# Patient Record
Sex: FEMALE | Race: WHITE | NOT HISPANIC OR LATINO | Employment: OTHER | URBAN - METROPOLITAN AREA
[De-identification: names, ages, dates, MRNs, and addresses within clinical notes are randomized per-mention and may not be internally consistent; named-entity substitution may affect disease eponyms.]

---

## 2017-10-30 RX ORDER — INSULIN GLARGINE 100 [IU]/ML
20 INJECTION, SOLUTION SUBCUTANEOUS DAILY
Qty: 1 BOX | Refills: 1 | Status: SHIPPED | OUTPATIENT
Start: 2017-10-30 | End: 2019-04-26

## 2017-10-30 RX ORDER — INSULIN GLARGINE 100 [IU]/ML
INJECTION, SOLUTION SUBCUTANEOUS
COMMUNITY
End: 2017-10-30 | Stop reason: SDUPTHER

## 2017-12-21 ENCOUNTER — OFFICE VISIT (OUTPATIENT)
Dept: GASTROENTEROLOGY | Facility: CLINIC | Age: 62
End: 2017-12-21
Payer: MEDICAID

## 2017-12-21 VITALS
DIASTOLIC BLOOD PRESSURE: 82 MMHG | HEIGHT: 64 IN | HEART RATE: 68 BPM | TEMPERATURE: 98 F | SYSTOLIC BLOOD PRESSURE: 134 MMHG | RESPIRATION RATE: 17 BRPM | BODY MASS INDEX: 33.94 KG/M2 | WEIGHT: 198.81 LBS

## 2017-12-21 DIAGNOSIS — E11.9 TYPE 2 DIABETES MELLITUS WITHOUT COMPLICATION, WITH LONG-TERM CURRENT USE OF INSULIN: ICD-10-CM

## 2017-12-21 DIAGNOSIS — K73.2 CHRONIC ACTIVE HEPATITIS: ICD-10-CM

## 2017-12-21 DIAGNOSIS — E66.9 OBESITY, UNSPECIFIED CLASSIFICATION, UNSPECIFIED OBESITY TYPE, UNSPECIFIED WHETHER SERIOUS COMORBIDITY PRESENT: ICD-10-CM

## 2017-12-21 DIAGNOSIS — I10 ESSENTIAL HYPERTENSION: ICD-10-CM

## 2017-12-21 DIAGNOSIS — Z79.4 TYPE 2 DIABETES MELLITUS WITHOUT COMPLICATION, WITH LONG-TERM CURRENT USE OF INSULIN: ICD-10-CM

## 2017-12-21 DIAGNOSIS — B18.2 CHRONIC HEPATITIS C WITHOUT HEPATIC COMA: Primary | ICD-10-CM

## 2017-12-21 PROBLEM — B19.20 HEPATITIS C: Status: ACTIVE | Noted: 2017-12-21

## 2017-12-21 PROCEDURE — 99215 OFFICE O/P EST HI 40 MIN: CPT | Mod: ,,, | Performed by: INTERNAL MEDICINE

## 2017-12-21 RX ORDER — AMLODIPINE BESYLATE 10 MG/1
10 TABLET ORAL DAILY
COMMUNITY
End: 2019-02-17 | Stop reason: SDUPTHER

## 2017-12-21 RX ORDER — CITALOPRAM 20 MG/1
40 TABLET, FILM COATED ORAL NIGHTLY
COMMUNITY
End: 2023-02-04

## 2017-12-21 RX ORDER — METFORMIN HYDROCHLORIDE 500 MG/1
500 TABLET ORAL 2 TIMES DAILY WITH MEALS
Status: ON HOLD | COMMUNITY
End: 2021-09-21 | Stop reason: SDUPTHER

## 2017-12-21 RX ORDER — UBIQUINOL 100 MG
200 CAPSULE ORAL DAILY
Status: ON HOLD | COMMUNITY
End: 2021-10-26

## 2017-12-21 RX ORDER — ALPRAZOLAM 0.25 MG/1
0.25 TABLET ORAL 2 TIMES DAILY PRN
COMMUNITY
End: 2019-04-26

## 2017-12-21 RX ORDER — ATORVASTATIN CALCIUM 20 MG/1
20 TABLET, FILM COATED ORAL DAILY
Status: ON HOLD | COMMUNITY
End: 2021-03-30 | Stop reason: HOSPADM

## 2017-12-21 RX ORDER — LISINOPRIL AND HYDROCHLOROTHIAZIDE 10; 12.5 MG/1; MG/1
1 TABLET ORAL DAILY
COMMUNITY
End: 2018-09-18 | Stop reason: SDUPTHER

## 2018-01-19 NOTE — PROGRESS NOTES
[facility] [place] New Patient Visit    Subjective:           PCP: Martha Clayton    Referring Provider: No ref. provider found    Chief Complaint: Hepatitis C (positive labs)       HPI:  HPI  Lela Mckinney is a 62 y.o. female here for   Palliation of positive labs. Patient  Denies chest pain or any severe abdominal pain. I work already done have been reviewed and additional lab work has been ordered  ROS:   Constitutional: No fevers, chills, weight loss  ENT: No allergies, sore throat, rhinorrhea  CV: No chest pain, palpitations, edema  Pulm: No cough, shortness of breath, wheezing  Ophtho: No vision changes  GI: No blood in stools, change in bowel habits, nausea/vomiting  Denies alternating diarrhea/constipation.   Derm: No rash  Heme: No easy bruising or lymphadenopathy  MSK: No arthralgias or myalgias  : No dysuria, hematuria, frequency, polyuria, or flank tenderness  Endo: No hot or cold intolerance  Neuro: No syncope or seizure, or dizziness  Psych: No hallucination, depression or suicidal ideation      Medical History:  has a past medical history of Diabetes; Diabetes; Hepatitis; Hypertension; and Hypertension.      Surgical History:  has a past surgical history that includes Tubal ligation; shoulder repair; Thyroidectomy; Thyroidectomy, partial (1978); Tubal ligation (1981); and Shoulder surgery (1981).    Family History:   Family History   Problem Relation Age of Onset    Heart disease Mother     Diabetes Mother     Crohn's disease Father     Rectal cancer Father        Social History:   Social History   Substance Use Topics    Smoking status: Never Smoker    Smokeless tobacco: Never Used    Alcohol use No       The patient's social and family histories were reviewed by me and updated in the appropriate section of the electronic medical record.    Allergies:   Review of patient's allergies indicates:   Allergen Reactions    Indocin [indomethacin sodium] Itching       Medications:  "  Current Outpatient Prescriptions   Medication Sig Dispense Refill    ALPRAZolam (XANAX) 0.25 MG tablet Take 0.25 mg by mouth 2 (two) times daily as needed for Anxiety.      amLODIPine (NORVASC) 10 MG tablet Take 10 mg by mouth once daily.      atorvastatin (LIPITOR) 20 MG tablet Take 20 mg by mouth once daily.      citalopram (CELEXA) 20 MG tablet Take 20 mg by mouth once daily.      coQ10, ubiquinol, 100 mg Cap Take by mouth.      insulin glargine (BASAGLAR KWIKPEN) 100 unit/mL (3 mL) InPn pen Inject 20 Units into the skin once daily. 1 Box 1    linagliptin (TRADJENTA) 5 mg Tab tablet Take 5 mg by mouth once daily.      lisinopril-hydrochlorothiazide (PRINZIDE,ZESTORETIC) 10-12.5 mg per tablet Take 1 tablet by mouth once daily.      metFORMIN (GLUCOPHAGE) 500 MG tablet Take 500 mg by mouth 2 (two) times daily with meals.      glecaprevir-pibrentasvir 100-40 mg Tab Take 3 tablets by mouth once daily. 84 tablet 3     No current facility-administered medications for this visit.      All medications and past history have been reviewed.        Objective:        Vital Signs:  Blood pressure 134/82, pulse 68, temperature 97.7 °F (36.5 °C), temperature source Skin, resp. rate 17, height 5' 4" (1.626 m), weight 90.2 kg (198 lb 12.8 oz). Body mass index is 34.12 kg/m².    Physical Exam:   General Appearance: Well appearing in no acute distress, well developed, well                 nourished  Head: Normocephalic, without obvious abnormality  Eyes:  Pupils equal, round and reactive to light  Throat: Lips, mucosa, and tongue normal; teeth and gums normal  Lungs: CTA bilaterally in anterior and posterior fields, no wheezes, no crackles  Heart:  Regular rate and rhythm, no murmurs heard  Abdomen: Soft, non tender, non distended with positive bowel sounds in all four quadrants. No hepatosplenomegaly, ascites, or mass. Negative for succusion splash  : female   Extremities: No cyanosis, edema or deformity  Skin: No " rash  Neurologic: Normal exam      Labs/Imaging:  All lab results and imaging results have been reviewed and discussed with the patient.    Endoscopy:     Assessment/Plan:    Assessment:       1. Chronic hepatitis C without hepatic coma    2. Chronic active hepatitis    3. Type 2 diabetes mellitus without complication, with long-term current use of insulin    4. Essential hypertension    5. Obesity, unspecified classification, unspecified obesity type, unspecified whether serious comorbidity present        Plan:       Chronic hepatitis C without hepatic coma  -     glecaprevir-pibrentasvir 100-40 mg Tab; Take 3 tablets by mouth once daily.  Dispense: 84 tablet; Refill: 3    Chronic active hepatitis    Type 2 diabetes mellitus without complication, with long-term current use of insulin    Essential hypertension    Obesity, unspecified classification, unspecified obesity type, unspecified whether serious comorbidity present        Return when meds come in.    The plan was discussed with the patient and all questions/concerns have been answered to the patient's satisfaction.    CC: No ref. provider found    Electronically signed by Ankush Allen MD    This note was dictated using voice recognition software and may contain grammatical errors.

## 2018-03-18 PROBLEM — I10 BENIGN HYPERTENSION: Status: ACTIVE | Noted: 2018-03-18

## 2018-03-18 PROBLEM — E11.9 TYPE 2 DIABETES MELLITUS WITHOUT COMPLICATION: Status: ACTIVE | Noted: 2018-03-18

## 2018-03-18 PROBLEM — F41.8 DEPRESSION WITH ANXIETY: Status: ACTIVE | Noted: 2018-03-18

## 2018-03-19 ENCOUNTER — TELEPHONE (OUTPATIENT)
Dept: GASTROENTEROLOGY | Facility: CLINIC | Age: 63
End: 2018-03-19

## 2018-03-19 NOTE — TELEPHONE ENCOUNTER
Arina with [facility] called to see if patient Fibrosure score had increased to a F3 or F4 because she was reviewing that the patient was denied Mavyret Rx due to that score last year. I explained patient did not have recent labs done. I let her know that I will speak with Dr. Allen to see if he wants to order current labs.

## 2018-05-10 PROBLEM — Z83.719 FAMILY HISTORY OF COLONIC POLYPS: Status: ACTIVE | Noted: 2018-05-10

## 2018-05-22 DIAGNOSIS — B18.2 CHRONIC HEPATITIS C WITHOUT HEPATIC COMA: Primary | ICD-10-CM

## 2018-05-30 ENCOUNTER — PROCEDURE VISIT (OUTPATIENT)
Dept: HEPATOLOGY | Facility: CLINIC | Age: 63
End: 2018-05-30
Attending: NURSE PRACTITIONER
Payer: MEDICAID

## 2018-05-30 DIAGNOSIS — B18.2 CHRONIC HEPATITIS C WITHOUT HEPATIC COMA: ICD-10-CM

## 2018-05-30 PROCEDURE — 91200 LIVER ELASTOGRAPHY: CPT | Mod: 26,S$PBB,, | Performed by: NURSE PRACTITIONER

## 2018-05-30 PROCEDURE — 91200 LIVER ELASTOGRAPHY: CPT | Mod: PBBFAC | Performed by: NURSE PRACTITIONER

## 2018-05-30 NOTE — PROCEDURES
Fibroscan Procedure     Name: Lela Mckinney  Date of Procedure : 2018   :: Skyla Solomon NP  Diagnosis: HCV    Probe: XL    Fibroscan reading: 10.0 KPa    Fibrosis:F3     CAP readin dB/m    Steatosis: :S1 , >11% steatosis

## 2018-09-18 RX ORDER — LISINOPRIL AND HYDROCHLOROTHIAZIDE 10; 12.5 MG/1; MG/1
TABLET ORAL
Qty: 90 TABLET | Refills: 1 | Status: SHIPPED | OUTPATIENT
Start: 2018-09-18 | End: 2018-09-18 | Stop reason: SDDI

## 2018-09-18 NOTE — TELEPHONE ENCOUNTER
----- Message from Kristine Torres MD sent at 9/18/2018  1:21 PM CDT -----  Call pharmacy and disallow this rx-she is overdue for re check

## 2018-10-22 RX ORDER — INSULIN GLARGINE 100 [IU]/ML
INJECTION, SOLUTION SUBCUTANEOUS
Refills: 1 | OUTPATIENT
Start: 2018-10-22

## 2018-11-01 ENCOUNTER — PATIENT MESSAGE (OUTPATIENT)
Dept: FAMILY MEDICINE | Facility: CLINIC | Age: 63
End: 2018-11-01

## 2019-02-18 RX ORDER — AMLODIPINE BESYLATE 10 MG/1
TABLET ORAL
Qty: 30 TABLET | Refills: 2 | Status: SHIPPED | OUTPATIENT
Start: 2019-02-18 | End: 2019-05-09 | Stop reason: SDUPTHER

## 2019-03-16 RX ORDER — LISINOPRIL AND HYDROCHLOROTHIAZIDE 10; 12.5 MG/1; MG/1
TABLET ORAL
Qty: 90 TABLET | Refills: 1 | Status: SHIPPED | OUTPATIENT
Start: 2019-03-16

## 2019-05-01 PROBLEM — K42.0 INCARCERATED UMBILICAL HERNIA: Status: ACTIVE | Noted: 2019-05-01

## 2019-05-03 ENCOUNTER — HOSPITAL ENCOUNTER (EMERGENCY)
Facility: HOSPITAL | Age: 64
Discharge: HOME | End: 2019-05-03
Attending: EMERGENCY MEDICINE
Payer: MEDICAID

## 2019-05-03 VITALS
HEART RATE: 90 BPM | WEIGHT: 223 LBS | OXYGEN SATURATION: 97 % | SYSTOLIC BLOOD PRESSURE: 155 MMHG | RESPIRATION RATE: 18 BRPM | HEIGHT: 64 IN | TEMPERATURE: 99 F | BODY MASS INDEX: 38.07 KG/M2 | DIASTOLIC BLOOD PRESSURE: 71 MMHG

## 2019-05-03 DIAGNOSIS — M79.89 LEG SWELLING: ICD-10-CM

## 2019-05-03 DIAGNOSIS — K59.03 CONSTIPATION DUE TO OPIOID THERAPY: Primary | ICD-10-CM

## 2019-05-03 DIAGNOSIS — T40.2X5A CONSTIPATION DUE TO OPIOID THERAPY: Primary | ICD-10-CM

## 2019-05-03 LAB
ALBUMIN SERPL BCP-MCNC: 3.4 G/DL (ref 3.5–5.2)
ALP SERPL-CCNC: 56 U/L (ref 55–135)
ALT SERPL W/O P-5'-P-CCNC: 12 U/L (ref 10–44)
ANION GAP SERPL CALC-SCNC: 9 MMOL/L (ref 8–16)
AST SERPL-CCNC: 12 U/L (ref 10–40)
BASOPHILS # BLD AUTO: 0 K/UL (ref 0–0.2)
BASOPHILS NFR BLD: 0.4 % (ref 0–1.9)
BILIRUB SERPL-MCNC: 0.3 MG/DL (ref 0.1–1)
BNP SERPL-MCNC: 20 PG/ML (ref 0–99)
BUN SERPL-MCNC: 13 MG/DL (ref 8–23)
CALCIUM SERPL-MCNC: 9.6 MG/DL (ref 8.7–10.5)
CHLORIDE SERPL-SCNC: 101 MMOL/L (ref 95–110)
CO2 SERPL-SCNC: 28 MMOL/L (ref 23–29)
CREAT SERPL-MCNC: 0.9 MG/DL (ref 0.5–1.4)
DIFFERENTIAL METHOD: ABNORMAL
EOSINOPHIL # BLD AUTO: 0.2 K/UL (ref 0–0.5)
EOSINOPHIL NFR BLD: 2.3 % (ref 0–8)
ERYTHROCYTE [DISTWIDTH] IN BLOOD BY AUTOMATED COUNT: 12.4 % (ref 11.5–14.5)
EST. GFR  (AFRICAN AMERICAN): >60 ML/MIN/1.73 M^2
EST. GFR  (NON AFRICAN AMERICAN): >60 ML/MIN/1.73 M^2
GLUCOSE SERPL-MCNC: 132 MG/DL (ref 70–110)
HCT VFR BLD AUTO: 39.1 % (ref 37–48.5)
HGB BLD-MCNC: 12.9 G/DL (ref 12–16)
LYMPHOCYTES # BLD AUTO: 2 K/UL (ref 1–4.8)
LYMPHOCYTES NFR BLD: 22.6 % (ref 18–48)
MCH RBC QN AUTO: 28.8 PG (ref 27–31)
MCHC RBC AUTO-ENTMCNC: 33 G/DL (ref 32–36)
MCV RBC AUTO: 87 FL (ref 82–98)
MONOCYTES # BLD AUTO: 0.6 K/UL (ref 0.3–1)
MONOCYTES NFR BLD: 6.9 % (ref 4–15)
NEUTROPHILS # BLD AUTO: 6.1 K/UL (ref 1.8–7.7)
NEUTROPHILS NFR BLD: 67.8 % (ref 38–73)
PLATELET # BLD AUTO: 297 K/UL (ref 150–350)
PMV BLD AUTO: 8.3 FL (ref 9.2–12.9)
POTASSIUM SERPL-SCNC: 3.8 MMOL/L (ref 3.5–5.1)
PROT SERPL-MCNC: 7.1 G/DL (ref 6–8.4)
RBC # BLD AUTO: 4.47 M/UL (ref 4–5.4)
SODIUM SERPL-SCNC: 138 MMOL/L (ref 136–145)
WBC # BLD AUTO: 9 K/UL (ref 3.9–12.7)

## 2019-05-03 PROCEDURE — 83880 ASSAY OF NATRIURETIC PEPTIDE: CPT

## 2019-05-03 PROCEDURE — 85025 COMPLETE CBC W/AUTO DIFF WBC: CPT

## 2019-05-03 PROCEDURE — 80053 COMPREHEN METABOLIC PANEL: CPT

## 2019-05-03 PROCEDURE — 99284 EMERGENCY DEPT VISIT MOD MDM: CPT

## 2019-05-03 PROCEDURE — 36415 COLL VENOUS BLD VENIPUNCTURE: CPT

## 2019-05-03 RX ORDER — POLYETHYLENE GLYCOL 3350, SODIUM SULFATE ANHYDROUS, SODIUM BICARBONATE, SODIUM CHLORIDE, POTASSIUM CHLORIDE 236; 22.74; 6.74; 5.86; 2.97 G/4L; G/4L; G/4L; G/4L; G/4L
4 POWDER, FOR SOLUTION ORAL ONCE
Qty: 4000 ML | Refills: 0 | Status: SHIPPED | OUTPATIENT
Start: 2019-05-03 | End: 2019-05-03

## 2019-05-04 NOTE — ED PROVIDER NOTES
Encounter Date: 5/3/2019    SCRIBE #1 NOTE: I, Adriana Levi , am scribing for, and in the presence of,  Dr. Durant . I have scribed the entire note.       History     Chief Complaint   Patient presents with    Leg Swelling     right    Constipation     last BM 2019  9:11 PM     The patient is a 63 y.o. female who is presenting with the gradual onset of bilateral leg swelling that began x 1 day ago. The pt states that the swelling extends from her feet to knees, with both legs equally swollen. No exacerbating or mitigating factors. She denies recent injury to the area, but does endorses having undergone hernia repair surgery within the last 2 weeks. Unrelated, the pt complaints of constipation x 3 days despite treatment with stool softeners and Murelax. She associated abdominal pain, fevers, nausea, or vomiting. Pertinent PMhx includes DM, Hep C, HTN. No other pertinent past surgical hx.               The history is provided by the patient and medical records.     Review of patient's allergies indicates:   Allergen Reactions    Indocin [indomethacin sodium] Itching    Dextromethorphan Anxiety     Past Medical History:   Diagnosis Date    Diabetes     Diabetes     Hepatitis     Hepatitis C    Hypertension     Hypertension      Past Surgical History:   Procedure Laterality Date    COLONOSCOPY N/A 5/10/2018    Performed [facility] at [facility]    shoulder repair      SHOULDER SURGERY      THYROIDECTOMY      THYROIDECTOMY, PARTIAL      TUBAL LIGATION      TUBAL LIGATION      XI ROBOTIC REPAIR, HERNIA, UMBILICAL N/A 2019    Performed by Sp Craft MD at [facility] OR     Family History   Problem Relation Age of Onset    Heart disease Mother     Diabetes Mother     Crohn's disease Father     Rectal cancer Father      Social History     Tobacco Use    Smoking status: Former Smoker     Last attempt to quit: 2008     Years since quittin.0     Smokeless tobacco: Never Used    Tobacco comment: Quit 10 yrs ago   Substance Use Topics    Alcohol use: No    Drug use: No     Review of Systems   Constitutional: Negative for fever.   HENT: Negative for congestion.    Eyes: Negative for visual disturbance.   Respiratory: Negative for wheezing.    Cardiovascular: Positive for leg swelling. Negative for chest pain.   Gastrointestinal: Positive for constipation. Negative for abdominal pain.   Genitourinary: Negative for dysuria.   Musculoskeletal: Negative for joint swelling.   Skin: Negative for rash.   Neurological: Negative for syncope.   Hematological: Does not bruise/bleed easily.   Psychiatric/Behavioral: Negative for confusion.       Physical Exam     Initial Vitals [05/03/19 2038]   BP Pulse Resp Temp SpO2   (!) 155/71 90 18 98.9 °F (37.2 °C) 97 %      MAP       --         Physical Exam    Nursing note and vitals reviewed.  Constitutional: She appears well-developed and well-nourished.   HENT:   Head: Normocephalic and atraumatic.   Mouth/Throat: Oropharynx is clear and moist.   Eyes: Conjunctivae and EOM are normal. Pupils are equal, round, and reactive to light.   Neck: Normal range of motion. Neck supple. No thyroid mass present.   Cardiovascular: Regular rhythm, normal heart sounds and intact distal pulses. Exam reveals no gallop and no friction rub.    No murmur heard.  Negative Homans' sign. No palpable cord tenderness. 2+ DP and PT pulses bilaterally.    Pulmonary/Chest: Breath sounds normal. She has no wheezes. She has no rhonchi. She has no rales.   Abdominal: Soft. Normal appearance and bowel sounds are normal. There is no tenderness.   Musculoskeletal: She exhibits edema. She exhibits no tenderness.   1+ bilateral non pitting edema to both legs. Legs are non tender and symmetric.    Neurological: She is alert and oriented to person, place, and time. She has normal strength. No cranial nerve deficit or sensory deficit.   Skin: Skin is warm and  dry. No rash noted. No erythema.   Clean, dry, and intact trochar sties to the L abdomen without any underlying erythema, induration, or tenderness.      Psychiatric: She has a normal mood and affect. Her speech is normal. Cognition and memory are normal.         ED Course   Procedures  Labs Reviewed - No data to display       Imaging Results    None          Medical Decision Making:   History:   Old Medical Records: I decided to obtain old medical records.  Clinical Tests:   Radiological Study: Ordered and Reviewed  ED Management:  This patient was interviewed and examined emergently.  Vital signs are stable. She has a nonsurgical and nonfocal abdominal examination. Trocar sites are well appearing.  She has no previous history of small-bowel obstructions, nor is she progressing with vomiting, and I have low suspicion for small bowel obstruction as an evolution of her opioid induced constipation.  Additional labs are found to be negative for evidence of CHF exacerbation, renal failure, dehydration, DVT as contributing factors to her mild bilateral lower extremity edema.  She is educated about supportive care for her suspected dependent edema in the outpatient setting.  She will be discharged with a prescription for oral laxative.  She is asked to follow up with her primary care doctor in specialist regarding symptom resolution.  She is asked to return to the ER for any new, concerning, or worsening symptoms. Patient was agreeable with this plan for follow-up and she was discharged in stable condition.            Scribe Attestation:   Scribe #1: I performed the above scribed service and the documentation accurately describes the services I performed. I attest to the accuracy of the note.               Clinical Impression:       ICD-10-CM ICD-9-CM   1. Constipation due to opioid therapy K59.03 564.09    T40.2X5A E935.2   2. Leg swelling M79.89 729.81         Disposition:   Disposition: Discharged  Condition:  Stable       I, Dr. Christian Durant, personally performed the services described in this documentation. All medical record entries made by the scribe were at my direction and in my presence.  I have reviewed the chart and agree that the record reflects my personal performance and is accurate and complete. Christian Durant MD.  6:32 AM 05/04/2019                   Christian Durant MD  05/04/19 0632

## 2019-05-04 NOTE — ED NOTES
"Patient identifiers for Lela Mckinney checked and correct.  LOC:  Patient is awake, alert, and aware of environment with an appropriate affect. Patient is oriented x 3 and speaking appropriately.  APPEARANCE:  Patient resting comfortably and in no acute distress. Patient is clean and well groomed, patient's clothing is properly fastened.  SKIN:  The skin is warm and dry. Patient has normal skin turgor and moist mucus membranes. Skin is intact other then 3 "stab" wounds to left abdomen from umbilical hernia repair on Wednesday; wounds well approximated, clean and dry.  MUSCULOSKELETAL:  Patient is moving all extremities well, no obvious deformities noted. Pulses intact.   RESPIRATORY:  Airway is open and patent. Respirations are spontaneous and non-labored with normal effort and rate.  CARDIAC:  Patient has a normal rate and rhythm. No peripheral edema noted. Capillary refill < 3 seconds.  ABDOMEN:  No distention noted.  Soft and tender upon palpation, S/P umbilical hernia repair; abdominal binder in place..  NEUROLOGICAL:  PERRL. Facial expression is symmetrical. Hand grasps are equal bilaterally. Normal sensation in all extremities when touched with finger.  Allergies reported:    Review of patient's allergies indicates:   Allergen Reactions    Indocin [indomethacin sodium] Itching    Dextromethorphan Anxiety     OTHER NOTES:  Patient here with swelling and pain to right leg; states pain is behind right knee, no acute injury, NV+ with equal sensation,  Palpable pedal pulses equal.    "

## 2019-05-09 RX ORDER — AMLODIPINE BESYLATE 10 MG/1
TABLET ORAL
Qty: 30 TABLET | Refills: 2 | Status: ON HOLD | OUTPATIENT
Start: 2019-05-09 | End: 2021-03-30 | Stop reason: HOSPADM

## 2019-07-09 ENCOUNTER — HOSPITAL ENCOUNTER (EMERGENCY)
Facility: HOSPITAL | Age: 64
Discharge: HOME | End: 2019-07-09
Attending: EMERGENCY MEDICINE
Payer: MEDICAID

## 2019-07-09 VITALS
SYSTOLIC BLOOD PRESSURE: 140 MMHG | OXYGEN SATURATION: 96 % | HEIGHT: 64 IN | DIASTOLIC BLOOD PRESSURE: 65 MMHG | HEART RATE: 76 BPM | RESPIRATION RATE: 18 BRPM | TEMPERATURE: 98 F | BODY MASS INDEX: 37.22 KG/M2 | WEIGHT: 218 LBS

## 2019-07-09 DIAGNOSIS — S20.229A CONTUSION OF BACK, UNSPECIFIED LATERALITY, INITIAL ENCOUNTER: Primary | ICD-10-CM
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
## 2019-07-09 PROCEDURE — 25000003 PHARM REV CODE 250: Performed by: PHYSICIAN ASSISTANT

## 2019-07-09 PROCEDURE — 99283 EMERGENCY DEPT VISIT LOW MDM: CPT

## 2019-07-09 RX ORDER — HYDROCODONE BITARTRATE AND ACETAMINOPHEN 5; 325 MG/1; MG/1
1 TABLET ORAL
Status: COMPLETED | OUTPATIENT
Start: 2019-07-09 | End: 2019-07-09

## 2019-07-09 RX ADMIN — HYDROCODONE BITARTRATE AND ACETAMINOPHEN 1 TABLET: 5; 325 TABLET ORAL at 05:07

## 2019-07-09 NOTE — ED PROVIDER NOTES
Encounter Date: 7/9/2019    SCRIBE #1 NOTE: Louis STARK, am scribing for, and in the presence of, Leila Andres PA-C.       History     Chief Complaint   Patient presents with    Fall     from attic stairs     Back Pain     rt. buttocks and legs  / neck pain        Time seen by provider: 3:13 PM on 07/09/2019    Lela Mckinney is a 63 y.o. female with HTN, hepatitis, DM, who presents to the ED following a fall 30 minutes PTA to ED. . She was on the second step of the attic stairs when her foot got caught in the stairs, and she fell backwards. She feel from a height of <2 feet. She notes experiencing pain in her back, legs, and buttocks. She states she did not lose consciousness following the fall and denied hitting her head. She denied LOC, vomiting, headache, and dizziness. She describes the leg pain as sharp, and notes it's worse with movement. She denies taking blood thinners. She takes metformin, trajenta, lisinopril, and gabapentin as prescribed. The patient denies vision changes, or any other symptoms at this time. A PSHx of shoulder surgery noted. Drug allergies to indocin, and dextromethorphan noted.    The history is provided by the patient and a relative.     Review of patient's allergies indicates:   Allergen Reactions    Indocin [indomethacin sodium] Itching    Dextromethorphan Anxiety     Past Medical History:   Diagnosis Date    Diabetes     Diabetes     Hepatitis     Hepatitis C    Hypertension     Hypertension      Past Surgical History:   Procedure Laterality Date    COLONOSCOPY N/A 5/10/2018    Performed by Skinny [facility] MD at [facility] ENDO    shoulder repair      SHOULDER SURGERY  1981    THYROIDECTOMY      THYROIDECTOMY, PARTIAL  1978    TUBAL LIGATION      TUBAL LIGATION  1981    XI ROBOTIC REPAIR, HERNIA, UMBILICAL N/A 5/1/2019    Performed by Sp Craft MD at [facility] OR     Family History   Problem Relation Age of Onset    Heart disease Mother      Diabetes Mother     Crohn's disease Father     Rectal cancer Father      Social History     Tobacco Use    Smoking status: Former Smoker     Last attempt to quit: 2008     Years since quittin.2    Smokeless tobacco: Never Used    Tobacco comment: Quit 10 yrs ago   Substance Use Topics    Alcohol use: No    Drug use: No     Review of Systems   Constitutional: Negative for chills and fever.   HENT: Negative for congestion, nosebleeds, rhinorrhea and sore throat.    Eyes: Negative for redness and visual disturbance.   Respiratory: Negative for cough, chest tightness and shortness of breath.    Cardiovascular: Negative for chest pain and palpitations.   Gastrointestinal: Negative for abdominal pain, diarrhea, nausea and vomiting.   Genitourinary: Negative for dysuria, frequency and hematuria.   Musculoskeletal: Positive for arthralgias (Right knee, and left hip) and back pain. Negative for neck pain and neck stiffness.        +pain in buttocks  +pain in bilateral legs   Skin: Negative for rash.   Neurological: Negative for dizziness, seizures, syncope, numbness and headaches.       Physical Exam     Initial Vitals [19 1457]   BP Pulse Resp Temp SpO2   (!) 145/66 82 16 98.1 °F (36.7 °C) 95 %      MAP       --         Physical Exam    Nursing note and vitals reviewed.  Constitutional: She appears well-developed and well-nourished. She is cooperative.  Non-toxic appearance. She does not have a sickly appearance.   HENT:   Head: Normocephalic and atraumatic.   Right Ear: External ear normal.   Left Ear: External ear normal.   Nose: Nose normal.   Mouth/Throat: Oropharynx is clear and moist.   Eyes: Conjunctivae and lids are normal. Pupils are equal, round, and reactive to light.   Neck: Normal range of motion and full passive range of motion without pain. Neck supple.   Cardiovascular: Normal rate, regular rhythm and normal heart sounds. Exam reveals no gallop and no friction rub.    No murmur  heard.  Pulmonary/Chest: Breath sounds normal. She has no wheezes. She has no rhonchi. She has no rales.   Abdominal: Soft. Normal appearance. There is no tenderness. There is no rigidity, no rebound and no guarding.   Musculoskeletal:   Bony tenderness to left anterior hip. Pain with internal, and external rotation to left hip. No bony tenderness to right hip. No tenderness to cervical, thoracic, or lumbar spine. Equal strength, and sensation to bilateral lower extremities.   Neurological: She is alert and oriented to person, place, and time. She has normal strength. Gait normal. GCS eye subscore is 4. GCS verbal subscore is 5. GCS motor subscore is 6.   Skin: Skin is warm, dry and intact. No rash noted.         ED Course   Procedures  Labs Reviewed - No data to display       Imaging Results          X-Ray Sacrum And Coccyx (Final result)  Result time 07/09/19 17:27:25    Final result by Zeeshan Gleason MD (07/09/19 17:27:25)                 Impression:      No acute osseous abnormality.      Electronically signed by: Zeeshan Gleason MD  Date:    07/09/2019  Time:    17:27             Narrative:    EXAMINATION:  XR SACRUM AND COCCYX    CLINICAL HISTORY:  Unspecified fall, initial encounter    TECHNIQUE:  Two or three views of the sacrum and coccyx were performed.    COMPARISON:  None    FINDINGS:  No fracture or dislocation.  The sacroiliac joints are intact.                               X-Ray Hip 2 View Left (Final result)  Result time 07/09/19 17:27:05    Final result by Zeeshan Gleason MD (07/09/19 17:27:05)                 Impression:      No acute osseous abnormality.      Electronically signed by: Zeeshan Gleason MD  Date:    07/09/2019  Time:    17:27             Narrative:    EXAMINATION:  XR HIP 2 VIEW LEFT    CLINICAL HISTORY:  Unspecified fall, initial encounter    TECHNIQUE:  AP view of the pelvis and frog leg lateral view of the left hip were  performed.    COMPARISON:  None    FINDINGS:  No fracture or dislocation.  No significant degenerative change.                                 Medical Decision Making:   History:   Old Medical Records: I decided to obtain old medical records.  Clinical Tests:   Radiological Study: Ordered and Reviewed       APC / Resident Notes:   Urgent evaluation of a 63-year-old female who fell approximately 30 min prior to arrival she states that she was on the 2nd step going to her attic when her foot got caught and she fell backwards landing onto her butt.  She denies hitting her head, loss of conscious, nausea, vomiting or visual changes.  She is alert and oriented with a normal neurological exam. She does not take blood thinners. No indication for imaging. She has equal strength and sensation to bilateral lower extremities.  No cervical, thoracic or lumbar spine to tenderness. X-ray showed no acute fractures.  Symptomatic treatment discussed with patient. Discussed results with patient. Return precautions given. Based on my clinical evaluation, I do not appreciate any immediate, emergent, or life threatening condition or etiology that warrants additional workup today and feel that the patient can be discharged with close follow up care.  Patient is to follow up with their primary care provider. Case was discussed with Dr. Garcia who is in agreement with the plan of care. All questions answered.          Scribe Attestation:   Scribe #1: I performed the above scribed service and the documentation accurately describes the services I performed. I attest to the accuracy of the note.    I, Leila Andres PA-C, personally performed the services described in this documentation. All medical record entries made by the scribe were at my direction and in my presence.  I have reviewed the chart and agree that the record reflects my personal performance and is accurate and complete. Leila Andres PA-C.  6:06 PM 07/09/2019              Clinical Impression:       ICD-10-CM ICD-9-CM   1. Contusion of back, unspecified laterality, initial encounter S20.229A 922.31   2. Fall W19.XXXA E888.9         Disposition:   Disposition: Discharged  Condition: Stable                        Leila Andres PA-C  07/09/19 1598

## 2019-07-09 NOTE — DISCHARGE INSTRUCTIONS
Take tylenol or motrin as needed for pain.  Follow up with your primary care provider.  For worsening symptoms, chest pain, shortness of breath, increased abdominal pain, high grade fever, stroke or stroke like symptoms, immediately go to the nearest Emergency Room or call 911 as soon as possible.

## 2019-07-17 ENCOUNTER — HOSPITAL ENCOUNTER (EMERGENCY)
Facility: HOSPITAL | Age: 64
Discharge: HOME | End: 2019-07-17
Attending: EMERGENCY MEDICINE
Payer: MEDICAID

## 2019-07-17 VITALS
HEIGHT: 64 IN | WEIGHT: 217 LBS | RESPIRATION RATE: 20 BRPM | TEMPERATURE: 98 F | OXYGEN SATURATION: 98 % | SYSTOLIC BLOOD PRESSURE: 139 MMHG | HEART RATE: 98 BPM | DIASTOLIC BLOOD PRESSURE: 66 MMHG | BODY MASS INDEX: 37.05 KG/M2

## 2019-07-17 DIAGNOSIS — S39.012A STRAIN OF LUMBAR REGION, INITIAL ENCOUNTER: Primary | ICD-10-CM

## 2019-07-17 DIAGNOSIS — M62.830 LUMBAR PARASPINAL MUSCLE SPASM: ICD-10-CM

## 2019-07-17 PROCEDURE — 96372 THER/PROPH/DIAG INJ SC/IM: CPT

## 2019-07-17 PROCEDURE — 25000003 PHARM REV CODE 250: Performed by: PHYSICIAN ASSISTANT

## 2019-07-17 PROCEDURE — 63600175 PHARM REV CODE 636 W HCPCS: Performed by: PHYSICIAN ASSISTANT

## 2019-07-17 PROCEDURE — 99284 EMERGENCY DEPT VISIT MOD MDM: CPT | Mod: 25

## 2019-07-17 RX ORDER — ORPHENADRINE CITRATE 100 MG/1
100 TABLET, EXTENDED RELEASE ORAL ONCE
Status: COMPLETED | OUTPATIENT
Start: 2019-07-17 | End: 2019-07-17

## 2019-07-17 RX ORDER — CYCLOBENZAPRINE HCL 10 MG
10 TABLET ORAL 3 TIMES DAILY PRN
Qty: 12 TABLET | Refills: 0 | Status: SHIPPED | OUTPATIENT
Start: 2019-07-17 | End: 2019-07-21

## 2019-07-17 RX ORDER — DICLOFENAC SODIUM 75 MG/1
75 TABLET, DELAYED RELEASE ORAL 2 TIMES DAILY PRN
Qty: 30 TABLET | Refills: 0 | Status: ON HOLD | OUTPATIENT
Start: 2019-07-17 | End: 2021-10-26

## 2019-07-17 RX ORDER — KETOROLAC TROMETHAMINE 30 MG/ML
30 INJECTION, SOLUTION INTRAMUSCULAR; INTRAVENOUS
Status: COMPLETED | OUTPATIENT
Start: 2019-07-17 | End: 2019-07-17

## 2019-07-17 RX ADMIN — KETOROLAC TROMETHAMINE 30 MG: 30 INJECTION, SOLUTION INTRAMUSCULAR at 04:07

## 2019-07-17 RX ADMIN — ORPHENADRINE CITRATE 100 MG: 100 TABLET, EXTENDED RELEASE ORAL at 04:07

## 2019-07-17 NOTE — ED PROVIDER NOTES
"Encounter Date: 7/17/2019    SCRIBE #1 NOTE: I, Julian Locke, am scribing for, and in the presence of, Ana Maria Gold PA-C.       History     Chief Complaint   Patient presents with    Back Pain     lower back pain denies injury or trauma     Time seen by provider: 4:06 PM on 07/17/2019      Lela Mckinney is a 63 y.o. female with a PMHx of HTN, hepatitis C, diabetes, and HTN who presents to the ED for back pain that worsened this am. She states that she previously was seen in the ED 1 week ago for a "bruised bottom" status post fall that she says was improving. Today, she states that she was sitting in a desk and "witting papers," when she "felt my back give out." She states that the pain is in the lower back and and is worst on the left side. She describes a tightness like pain that stretches from her back to her left knee. She states that "When I walk I feel like I have a tightness that I haven't had before." She denies fever, SOB, chest pain, abdominal pain, nausea, vomiting, incontinence of urine or stool, acute numbness, weakness, trauma, or any other complaint at this time. She does admit to taking her prescribed gabapentin for the pain with little relief. She has a PSHx of shoulder surgery, tubal ligation, colonoscopy, and thyroidectomy.        The history is provided by the patient.     Review of patient's allergies indicates:   Allergen Reactions    Indocin [indomethacin sodium] Itching    Dextromethorphan Anxiety     Past Medical History:   Diagnosis Date    Diabetes     Diabetes     Hepatitis     Hepatitis C    Hypertension     Hypertension      Past Surgical History:   Procedure Laterality Date    COLONOSCOPY N/A 5/10/2018    Performed by Skinny [facility] at [facility] ENDO    shoulder repair      SHOULDER SURGERY  1981    THYROIDECTOMY      THYROIDECTOMY, PARTIAL  1978    TUBAL LIGATION      TUBAL LIGATION  1981    XI ROBOTIC REPAIR, HERNIA, UMBILICAL N/A 5/1/2019    Performed by " Sp Craft MD at [facility] OR     Family History   Problem Relation Age of Onset    Heart disease Mother     Diabetes Mother     Crohn's disease Father     Rectal cancer Father      Social History     Tobacco Use    Smoking status: Former Smoker     Last attempt to quit: 2008     Years since quittin.2    Smokeless tobacco: Never Used    Tobacco comment: Quit 10 yrs ago   Substance Use Topics    Alcohol use: No    Drug use: No     Review of Systems   Constitutional: Negative for chills and fever.   HENT: Negative for facial swelling and trouble swallowing.    Eyes: Negative for discharge.   Respiratory: Negative for cough, chest tightness, shortness of breath and wheezing.    Cardiovascular: Negative for chest pain and palpitations.   Gastrointestinal: Negative for abdominal pain, diarrhea, nausea and vomiting.   Genitourinary: Negative for dysuria and hematuria.   Musculoskeletal: Positive for back pain. Negative for arthralgias, joint swelling, myalgias, neck pain and neck stiffness.   Skin: Negative for color change, pallor, rash and wound.   Neurological: Negative for dizziness, syncope, weakness, light-headedness, numbness and headaches.   Hematological: Does not bruise/bleed easily.   Psychiatric/Behavioral: The patient is not nervous/anxious.    All other systems reviewed and are negative.      Physical Exam     Initial Vitals [19 1549]   BP Pulse Resp Temp SpO2   139/66 98 20 98.2 °F (36.8 °C) 98 %      MAP       --         Physical Exam    Nursing note and vitals reviewed.  Constitutional: She appears well-developed and well-nourished. She is not diaphoretic. No distress.   HENT:   Head: Normocephalic and atraumatic.   Neck: Normal range of motion. Neck supple.   Cardiovascular: Normal rate, regular rhythm, normal heart sounds and intact distal pulses. Exam reveals no gallop and no friction rub.    No murmur heard.  Pulmonary/Chest: Breath sounds normal. No respiratory  distress. She has no wheezes. She has no rhonchi. She has no rales.   Abdominal: Soft. She exhibits no distension and no mass. There is no tenderness.   Musculoskeletal: Normal range of motion. She exhibits tenderness. She exhibits no edema.   TTP noted to bilateral lumbar paraspinal muscles extending into bilateral buttocks with spasm noted.  No midline spinous process tenderness noted.  No decreased range of motion, decreased strength or loss of sensation to bilateral lower extremities.  Palpable 2+ pedal pulses.   Neurological: She is alert and oriented to person, place, and time. She has normal strength. No sensory deficit.   Skin: Skin is warm and dry. No rash and no abscess noted. No erythema.   Psychiatric: She has a normal mood and affect.         ED Course   Procedures  Labs Reviewed - No data to display       Imaging Results    None          Medical Decision Making:   History:   Old Medical Records: I decided to obtain old medical records.  Old Records Summarized: records from clinic visits and records from previous admission(s).  Differential Diagnosis:   Fracture  Dislocation  Sprain  Contusion  Strain  Spasm  Cauda equina  Epidural abscess       APC / Resident Notes:   Concern symptoms consistent with lumbar strain and associated spasm.  She is given a dose of IM Toradol and Norflex here in the emergency department.  She is beginning to have some relief.  No midline spinous process tenderness noted and no need for imaging or further testing at this time.  Low suspicion for cauda equina or epidural abscess.  She does have a history of a recent fall, in which she bruised her tailbone.  Today the symptoms began after sitting up straight, which likely caused the muscular strain and associated spasm.  She is discharged home to follow up with primary care provider and/or pain management as needed for re-evaluation and further treatment options.  She voices understanding is agreeable to the plan.  She is given  1st prescription for Voltaren and Flexeril.   I, Ana Maria Gold PA-C, personally performed the services described in this documentation. All medical record entries made by the scribe were at my direction and in my presence.  I have reviewed the chart and agree that the record reflects my personal performance and is accurate and complete. Ana Maria Gold PA-C.  6:45 PM 07/17/2019                Clinical Impression:       ICD-10-CM ICD-9-CM   1. Strain of lumbar region, initial encounter S39.012A 847.2   2. Lumbar paraspinal muscle spasm M62.830 724.8                                Ana Maria Gold PA-C  07/17/19 1846

## 2019-07-17 NOTE — ED NOTES
Given written and verbal DC instructions questions answered per MD aware to follow up with PCP encouraged to return if needed. Given RX with teaching.   Aware the RX was sent electronically to pharmacy

## 2019-07-20 DIAGNOSIS — S93.402D SPRAIN OF UNSPECIFIED LIGAMENT OF LEFT ANKLE, SUBSEQUENT ENCOUNTER: Primary | ICD-10-CM

## 2019-07-20 DIAGNOSIS — M25.572 PAIN IN LEFT ANKLE: ICD-10-CM

## 2019-08-02 ENCOUNTER — CLINICAL SUPPORT (OUTPATIENT)
Dept: REHABILITATION | Facility: HOSPITAL | Age: 64
End: 2019-08-02
Payer: MEDICAID

## 2019-08-02 DIAGNOSIS — S93.402D SPRAIN OF UNSPECIFIED LIGAMENT OF LEFT ANKLE, SUBSEQUENT ENCOUNTER: ICD-10-CM

## 2019-08-02 DIAGNOSIS — M25.572 LEFT ANKLE PAIN, UNSPECIFIED CHRONICITY: Primary | ICD-10-CM

## 2019-08-02 DIAGNOSIS — M20.40 ACQUIRED HAMMER TOE: ICD-10-CM

## 2019-08-02 DIAGNOSIS — S93.402D SPRAIN OF LIGAMENT OF LEFT ANKLE, SUBSEQUENT ENCOUNTER: ICD-10-CM

## 2019-08-02 PROCEDURE — 97140 MANUAL THERAPY 1/> REGIONS: CPT

## 2019-08-02 PROCEDURE — 97110 THERAPEUTIC EXERCISES: CPT

## 2019-08-02 PROCEDURE — 97035 APP MDLTY 1+ULTRASOUND EA 15: CPT

## 2019-08-02 NOTE — PROGRESS NOTES
Physical Therapy Daily Treatment Note     Name: Lela COLEMAN [facility] Number: [id number]    Therapy Diagnosis:   Encounter Diagnoses   Name Primary?    Sprain of unspecified ligament of left ankle, subsequent encounter     Left ankle pain, unspecified chronicity Yes    Acquired hammer toe     Sprain of ligament of left ankle, subsequent encounter      Physician: Atiya Rosas DPM    Visit Date: 8/2/2019    Physician Orders: PT eval and treat   Medical Diagnosis: pain in L ankle, sprain  Evaluation Date: 07/1619  Authorization Period Expiration: 09/13/19  Plan of Care Certification Period: 10/16/19  Visit #/Visits authorized: 4/ 12     Time In: 0905  Time Out: 1000  Total Billable Time: 55 minutes    Precautions: Standard and Fall    Subjective     Pt reports: that she feels like she is getting better since starting therapy.  Patient reports that she is having some pain but not as much as before she started.  She was compliant with home exercise program.  Response to previous treatment: good  Functional change: decreased pain, slightly easier to get around    Pain: 2/10  Location: left ankle    Objective     Lela received therapeutic exercises to develop strength, endurance, flexibility and ROM for 40 minutes including:    ·   Cardio -  Nu Step: 10 minutes  ·   Ankle - Towel Crunches: 3 x 10 reps  ·   Ankle - Marbles: 3 reps (20 marbles per rep)  ·   Ankle - ABC's ROM: 2 reps  ·   Ankle - Achilles Stretch: 3 x 30 sec  ·   Ankle - Circles (ROM): 30 reps x 2 each direction  ·   Ankle - AROM - All Directions: 2 sets of 10 w    Lela received the following manual therapy techniques: Soft tissue Mobilization were applied to the: lateral ankle musculature for 10 minutes, including:      Modality - Ultrasound: (intermittent) 1.5 w/cm2 x 6 min to L lateral ankle         Lela participated in neuromuscular re-education activities to improve: Balance and Proprioception for 0 minutes. The following  activities were included:  Lela participated in dynamic functional therapeutic activities to improve functional performance for 0  minutes, including:  Lela participated in gait training to improve functional mobility and safety for 0  minutes, including:      Lela received cold pack for 0 minutes to left ankle -- pt declined.      Home Exercises Provided and Patient Education Provided     Education provided:   - compliance with HEP    Written Home Exercises Provided: Patient instructed to cont prior HEP.  Exercises were reviewed and Lela was able to demonstrate them prior to the end of the session.  Lela demonstrated good  understanding of the education provided.     See EMR under Patient Instructions for exercises provided prior visit.    Assessment     Lela tolerated treatment session well today.  Patient with good response to progression of ankle strengthening without provocation of pain.  Patient with increased tissue restrictions palpated along the lateral aspect of the ankle.  Patient with decreased pain and improved gait pattern post care.     Lela is progressing well towards her goals.   Pt prognosis is Good.     Pt will continue to benefit from skilled outpatient physical therapy to address the deficits listed in the problem list box on initial evaluation, provide pt/family education and to maximize pt's level of independence in the home and community environment.     Pt's spiritual, cultural and educational needs considered and pt agreeable to plan of care and goals.     Anticipated barriers to physical therapy: none anticipated     Short Term Goals   ·   1a. Patient will demonstrate initial HEPwith use of handouts prn in order to optimize Rx outcomes in order to maximize functional mob. in 5 visits.  -In progress   ·   2a. Patient will relate pain </= 6/10 over the previous 7 days in order to tolerate/improve WB and improve gt quality, day to day tasks, home and community  mobility in 8 visits.-In progress   ·   ·   3a. Patient will improve L ankle ROM for DF to 10 deg (initial 5 deg), for PF to 35 deg. (initial 25 deg), inversion to 30 deg (initial 25 deg), and Eversion to neutral/0 deg (initial -5 deg) in order to improve gt quality and promote a more normal gt pattern in 8 visits.  -In progress   ·   ·   4. Patient will have decreased L ankle edema to 57.5 cm fig-8 measure (initial 58.0cm) in order to improve ROM and promote a normal gt pattern in 8 visits.  -In progress   ·   ·   5a. Patient will amb x 7 minutes with an RPE </= 6 on the modified FELIX scale in order to improve household and community amb in 8 visits.   -In progress   ·   ·   6a. Patient will improve functional score to 30% (initial 23.75%) as evidenced by the LEFS in order to improve his house hold/community mobility, ADLS and amb in 8 visits.  -In progress   ·   ·   7a. Patient will improve Tinetti gt and bal score to 23/28 in order to improve gt quality and decrease fall risk in 8 visits.  -In progress   ·     Long Term Goals   ·   1b. Patient will demonstrate final HEPwith use of handouts prn in order to cont to maximize functional mob. by discharge.  -In progress   ·   ·   2b. Patient will relate pain </= 3/10 over the previous 7 days in order to tolerate/improve WB and improve gt quality, day to day tasks, home and community mobility by discharge. -In progress   ·   ·   3b. Patient will improve L ankle ROM for PF to 45 deg. (initial 25 deg), inversion to 35 deg (initial 25 deg), and Eversion to 15 deg (initial -5 deg) in order to improve gt quality and promote a more normal gt pattern by discharge. -In progress   ·   ·   5b. Patient will amb x 10 minutes with an RPE </= 4 on the modified FELIX scale in order to improve household and community amb by discharge.  -In progress   ·    ·   6b. Patient will improve functional score to 40% (initial 23.75%) as evidenced by the LEFS in order to improve his house  hold/community mobility, ADLS and amb by discharge.-In progress   ·   ·   7b. Patient will improve Tinetti gt and bal score to >/= 24/28 in order to improve gt quality and decrease fall risk by discharge.  -In progress   ·   ·   8.  Patient will improve MMT grades for L ankle/foot DF to 5/5 (initial 4+/5), In to 5/5 (initial 4+/5) and Ev to 5/5 (initial 4+/5) in order to improve activity carmelina and promote a more mormal gt pattern by discharge.  -In progress   ·   ·   9.  Patient will decrease TUG score </= 13 seconds in orderr to decrease fall risk by discharge.  -In progress   ·     Plan     Continue with current POC and progress as tolerated by the patient.     Angela Frederick, PTA

## 2019-08-05 ENCOUNTER — CLINICAL SUPPORT (OUTPATIENT)
Dept: REHABILITATION | Facility: HOSPITAL | Age: 64
End: 2019-08-05
Payer: MEDICAID

## 2019-08-05 ENCOUNTER — DOCUMENTATION ONLY (OUTPATIENT)
Dept: REHABILITATION | Facility: HOSPITAL | Age: 64
End: 2019-08-05

## 2019-08-05 DIAGNOSIS — M25.572 ACUTE LEFT ANKLE PAIN: ICD-10-CM

## 2019-08-05 PROCEDURE — 97035 APP MDLTY 1+ULTRASOUND EA 15: CPT

## 2019-08-05 PROCEDURE — 97110 THERAPEUTIC EXERCISES: CPT

## 2019-08-05 PROCEDURE — 97140 MANUAL THERAPY 1/> REGIONS: CPT

## 2019-08-05 NOTE — PROGRESS NOTES
PT/PTA face to face conference completed regarding patient treatment plan and progress towards established goals. Treatment will be continued as described in initial report/ evaluation and treatment/progress notes. Patient will be seen by physical therapist every sixth visit or minimally once per month.       Faith Jones, PTA

## 2019-08-05 NOTE — PROGRESS NOTES
"  Physical Therapy Daily Treatment Note     Name: Lela COLEMAN [facility] Number: [id number]    Therapy Diagnosis:   Encounter Diagnosis   Name Primary?    Acute left ankle pain      Physician: Atiya Rosas DPM    Visit Date: 8/5/2019    Physician Orders: PT eval and treat   Medical Diagnosis: pain in L ankle, sprain  Evaluation Date: 07/1619  Authorization Period Expiration: 09/13/19  Plan of Care Certification Period: 10/16/19  Visit #/Visits authorized: 4/ 12     Time In: 0955  Time Out: 1000  Total Billable Time: 65 minutes    Precautions: Standard and Fall    Subjective     Pt reports: "I don't know if its the weather today or not but its bothering me this morning."   She was compliant with home exercise program.  Response to previous treatment: good  Functional change: decreased pain, slightly easier to get around    Pain: 4/10  Location: left ankle    Objective     Lela received therapeutic exercises to develop strength, endurance, flexibility and ROM for 45 minutes including:    ·   Cardio -  Nu Step: 10 minutes  ·   Ankle - Towel Crunches: 3 x 10 reps  ·   Ankle - Marbles: 3 reps (20 marbles per rep)  ·   Ankle - ABC's ROM: 2 reps  ·   Ankle - Achilles Stretch: 3 x 30 sec  ·   Ankle - Circles (ROM): 30 reps x 2 each direction  ·   Ankle - AROM - All Directions: 3 sets of 10 /c OTB    Lela received the following manual therapy techniques: Soft tissue Mobilization were applied to the: lateral ankle musculature for 10 minutes, including:      Modality - Ultrasound: (intermittent) 1.5 w/cm2 x 6 min to L lateral ankle         Lela participated in neuromuscular re-education activities to improve: Balance and Proprioception for 0 minutes. The following activities were included:  Lela participated in dynamic functional therapeutic activities to improve functional performance for 0  minutes, including:  Lela participated in gait training to improve functional mobility and safety for 0  " minutes, including:      Lela received cold pack for 0 minutes to left ankle -- pt declined.      Home Exercises Provided and Patient Education Provided     Education provided:   - compliance with HEP    Written Home Exercises Provided: Patient instructed to cont prior HEP.  Exercises were reviewed and Lela was able to demonstrate them prior to the end of the session.  Lela demonstrated good  understanding of the education provided.     See EMR under Patient Instructions for exercises provided prior visit.    Assessment     Pt tolerated the addition of increased reps /c ankle TB exercises well. She was /c reports of increase in symptoms during eversion which resolved /c rest. Pt noted /c moderate tissue dysfunction along the lateral aspect of her L calf /c mild improvement noted post STM. Pt will continue to benefit from skilled PT to improve ankle strength to allow pt to return to PLOF without pain and limitations.     Lela is progressing well towards her goals.   Pt prognosis is Good.     Pt will continue to benefit from skilled outpatient physical therapy to address the deficits listed in the problem list box on initial evaluation, provide pt/family education and to maximize pt's level of independence in the home and community environment.     Pt's spiritual, cultural and educational needs considered and pt agreeable to plan of care and goals.     Anticipated barriers to physical therapy: none anticipated     Short Term Goals   ·   1a. Patient will demonstrate initial HEPwith use of handouts prn in order to optimize Rx outcomes in order to maximize functional mob. in 5 visits.  -In progress 8/5/2019    ·   2a. Patient will relate pain </= 6/10 over the previous 7 days in order to tolerate/improve WB and improve gt quality, day to day tasks, home and community mobility in 8 visits.-In progress 8/5/2019   ·   ·   3a. Patient will improve L ankle ROM for DF to 10 deg (initial 5 deg), for PF to 35  deg. (initial 25 deg), inversion to 30 deg (initial 25 deg), and Eversion to neutral/0 deg (initial -5 deg) in order to improve gt quality and promote a more normal gt pattern in 8 visits.  -In progress 8/5/2019  ·   ·   4. Patient will have decreased L ankle edema to 57.5 cm fig-8 measure (initial 58.0cm) in order to improve ROM and promote a normal gt pattern in 8 visits.  -In progress 8/5/2019  ·  5a. Patient will amb x 7 minutes with an RPE </= 6 on the modified FELIX scale in order to improve household and community amb in 8 visits.   -In progress 8/5/2019    ·   ·   6a. Patient will improve functional score to 30% (initial 23.75%) as evidenced by the LEFS in order to improve his house hold/community mobility, ADLS and amb in 8 visits.  -In progress   ·   ·   7a. Patient will improve Tinetti gt and bal score to 23/28 in order to improve gt quality and decrease fall risk in 8 visits.  -In progress 8/5/2019    Long Term Goals   ·   1b. Patient will demonstrate final HEPwith use of handouts prn in order to cont to maximize functional mob. by discharge.  -In progress   ·   ·   2b. Patient will relate pain </= 3/10 over the previous 7 days in order to tolerate/improve WB and improve gt quality, day to day tasks, home and community mobility by discharge. -In progress   ·   ·   3b. Patient will improve L ankle ROM for PF to 45 deg. (initial 25 deg), inversion to 35 deg (initial 25 deg), and Eversion to 15 deg (initial -5 deg) in order to improve gt quality and promote a more normal gt pattern by discharge. -In progress   ·   ·   5b. Patient will amb x 10 minutes with an RPE </= 4 on the modified FELIX scale in order to improve household and community amb by discharge.  -In progress   ·    ·   6b. Patient will improve functional score to 40% (initial 23.75%) as evidenced by the LEFS in order to improve his house hold/community mobility, ADLS and amb by discharge.-In progress   ·   ·   7b. Patient will improve Tinetti gt  and bal score to >/= 24/28 in order to improve gt quality and decrease fall risk by discharge.  -In progress   ·   ·   8.  Patient will improve MMT grades for L ankle/foot DF to 5/5 (initial 4+/5), In to 5/5 (initial 4+/5) and Ev to 5/5 (initial 4+/5) in order to improve activity carmelina and promote a more mormal gt pattern by discharge.  -In progress   ·   ·   9.  Patient will decrease TUG score </= 13 seconds in orderr to decrease fall risk by discharge.  -In progress   ·     Plan     Continue with current POC and progress as tolerated by the patient. Add ankle stability exercises as per pts tolerance,    Faith Jones, PTA

## 2019-08-07 ENCOUNTER — CLINICAL SUPPORT (OUTPATIENT)
Dept: REHABILITATION | Facility: HOSPITAL | Age: 64
End: 2019-08-07
Payer: MEDICAID

## 2019-08-07 DIAGNOSIS — M25.572 ACUTE LEFT ANKLE PAIN: ICD-10-CM

## 2019-08-07 PROCEDURE — 97140 MANUAL THERAPY 1/> REGIONS: CPT

## 2019-08-07 PROCEDURE — 97110 THERAPEUTIC EXERCISES: CPT

## 2019-08-07 NOTE — PROGRESS NOTES
Physical Therapy Daily Treatment Note     Name: Lela COLEMAN [facility] Number: [id number]    Therapy Diagnosis:   Encounter Diagnosis   Name Primary?    Acute left ankle pain      Physician: Atiya Rosas DPM    Visit Date: 8/7/2019    Physician Orders: PT eval and treat   Medical Diagnosis: pain in L ankle, sprain  Evaluation Date: 07/1619  Authorization Period Expiration: 09/13/19  Plan of Care Certification Period: 10/16/19  Visit #/Visits authorized: 5/ 12     Time In: 10:00  Time Out: 11:00  Total Billable Time: 60 minutes    Precautions: Standard and Fall    Subjective     Pt reports: Feeling okay this day and with 2/10 pain to L ankle.    She was compliant with home exercise program.  Response to previous treatment: good  Functional change: decreased pain, slightly easier to get around    Pain: 2/10  Location: left ankle    Objective     Lela received therapeutic exercises to develop strength, endurance, flexibility and ROM for 45 minutes including:    ·   Cardio - Recumbent Bike: 10 minutes  ·   Ankle - Towel Crunches: 3 x 10 reps  ·   Ankle - Marbles: 3 reps (20 marbles per rep)  ·   Ankle - ABC's ROM: 2 reps  ·   Ankle - Achilles Stretch: 3 x 30 sec  ·   Ankle - Circles (ROM): 30 reps x 2 each direction  ·   Ankle - AROM - All Directions: 3 sets of 10 /c OTB    Lela received the following manual therapy techniques: Soft tissue Mobilization were applied to the: lateral ankle musculature for 10 minutes.      Modality - Ultrasound: (intermittent) 1.5 w/cm2 x 6 min to L lateral ankle (not performed)    Lela participated in neuromuscular re-education activities to improve: Balance and Proprioception for 0 minutes. The following activities were included:  Lela participated in dynamic functional therapeutic activities to improve functional performance for 0  minutes, including:  Lela participated in gait training to improve functional mobility and safety for 0  minutes,  including:      Lela received cold pack for 0 minutes to left ankle --     Home Exercises Provided and Patient Education Provided     Education provided:   - compliance with HEP    Written Home Exercises Provided: Patient instructed to cont prior HEP.    See EMR under Patient Instructions for exercises provided prior visit.    Assessment       Patient participated with Rx to address her deficits and able to perform her TE without any increases in symptoms.  She required VC/TC for improved exs quality and performance.  She cont with increased tightness to lateral ankle musculature that is responding to ST mobilizaton and she relates no pain following Rx.  She is expected to cont to prog to goals with cont Rx in order to maximize functional mobility and improve L ankle stability.        Lela is progressing well towards her goals.   Pt prognosis is Good.     Pt will continue to benefit from skilled outpatient physical therapy to address the deficits listed on initial evaluation, provide pt/family education and to maximize pt's level of independence in the home and community environment.     Pt's spiritual, cultural and educational needs considered and pt agreeable to plan of care and goals.     Anticipated barriers to physical therapy: none anticipated     Short Term Goals   ·   1a. Patient will demonstrate initial HEP with use of handouts prn in order to optimize Rx outcomes in order to maximize functional mob. in 5 visits.  -In progress 8/7/2019    ·   2a. Patient will relate pain </= 6/10 over the previous 7 days in order to tolerate/improve WB and improve gt quality, day to day tasks, home and community mobility in 8 visits.-In progress 8/7/2019   ·   ·   3a. Patient will improve L ankle ROM for DF to 10 deg (initial 5 deg), for PF to 35 deg. (initial 25 deg), inversion to 30 deg (initial 25 deg), and Eversion to neutral/0 deg (initial -5 deg) in order to improve gt quality and promote a more normal gt  pattern in 8 visits.  -In progress 8/7/2019  ·   ·   4. Patient will have decreased L ankle edema to 57.5 cm fig-8 measure (initial 58.0cm) in order to improve ROM and promote a normal gt pattern in 8 visits.  -In progress 8/7/2019  ·  5a. Patient will amb x 7 minutes with an RPE </= 6 on the modified FELIX scale in order to improve household and community amb in 8 visits.   -In progress 8/7/2019    ·   ·   6a. Patient will improve functional score to 30% (initial 23.75%) as evidenced by the LEFS in order to improve his house hold/community mobility, ADLS and amb in 8 visits.  -In progress   ·   ·   7a. Patient will improve Tinetti gt and bal score to 23/28 in order to improve gt quality and decrease fall risk in 8 visits.  -In progress 8/7/2019    Long Term Goals   ·   1b. Patient will demonstrate final HEPwith use of handouts prn in order to cont to maximize functional mob. by discharge.  -In progress   ·   ·   2b. Patient will relate pain </= 3/10 over the previous 7 days in order to tolerate/improve WB and improve gt quality, day to day tasks, home and community mobility by discharge. -In progress   ·   ·   3b. Patient will improve L ankle ROM for PF to 45 deg. (initial 25 deg), inversion to 35 deg (initial 25 deg), and Eversion to 15 deg (initial -5 deg) in order to improve gt quality and promote a more normal gt pattern by discharge. -In progress   ·   ·   5b. Patient will amb x 10 minutes with an RPE </= 4 on the modified FELIX scale in order to improve household and community amb by discharge.  -In progress   ·    ·   6b. Patient will improve functional score to 40% (initial 23.75%) as evidenced by the LEFS in order to improve his house hold/community mobility, ADLS and amb by discharge.-In progress   ·   ·   7b. Patient will improve Tinetti gt and bal score to >/= 24/28 in order to improve gt quality and decrease fall risk by discharge.  -In progress   ·   ·   8.  Patient will improve MMT grades for L  ankle/foot DF to 5/5 (initial 4+/5), In to 5/5 (initial 4+/5) and Ev to 5/5 (initial 4+/5) in order to improve activity carmelina and promote a more mormal gt pattern by discharge.  -In progress   ·   ·   9.  Patient will decrease TUG score </= 13 seconds in orderr to decrease fall risk by discharge.  -In progress   ·     Plan     Continue with current POC and progress as tolerated by the patient. Add BAPS board as per pts tolerance.    Gianni Ortiz, PT

## 2019-08-14 ENCOUNTER — CLINICAL SUPPORT (OUTPATIENT)
Dept: REHABILITATION | Facility: HOSPITAL | Age: 64
End: 2019-08-14
Payer: MEDICAID

## 2019-08-14 DIAGNOSIS — M25.572 ACUTE LEFT ANKLE PAIN: ICD-10-CM

## 2019-08-14 PROCEDURE — 97110 THERAPEUTIC EXERCISES: CPT

## 2019-08-14 NOTE — PROGRESS NOTES
Physical Therapy Daily Treatment Note     Name: Lela COLEMAN [facility] Number: [id number]    Therapy Diagnosis:   Encounter Diagnosis   Name Primary?    Acute left ankle pain      Physician: Atiya Rosas DPM    Visit Date: 8/14/2019    Physician Orders: PT eval and treat   Medical Diagnosis: pain in L ankle, sprain  Evaluation Date: 07/1619  Authorization Period Expiration: 09/13/19  Plan of Care Certification Period: 10/16/19  Visit #/Visits authorized: 6/ 12  (Progress Note due 8/16/19)  PTA visit: 1/5     Time In: 1300  Time Out: 1400  Total Billable Time: 30 minutes    Precautions: Standard and Fall    Subjective     Pt reports: Feeling okay this day and with 2/10 pain to L ankle.  Towards the end of tx pt reported just having come from her MD office where she received an injection in her L ankle. Pt reported her MD informed her it was safe to come to PT after receiving her injection.   She was compliant with home exercise program.  Response to previous treatment: good  Functional change: decreased pain, slightly easier to get around    Pain: 3/10  Location: left ankle    Objective     Lela received therapeutic exercises to develop strength, endurance, flexibility and ROM for 45 minutes including:    ·   Cardio - Recumbent Bike: 10 minutes     Gastroc Stretch 3 x 30 sec   ·   Ankle - Towel Crunches: 3 x 10 reps  ·   Ankle - Marbles: 3 reps (20 marbles per rep)  ·   Ankle - ABC's ROM: 2 reps  ·   Ankle - Circles (ROM): 30 reps x 2 each direction  ·   Ankle - AROM - All Directions: 3 sets of 10 /c [place] TB   Ankle- SLS 2 x 30 sec   Ankle- heel raises 3 x 10 reps   BAPS board x 3 minutes 2 ways (add on next visit)    Lela received the following manual therapy techniques: Soft tissue Mobilization were applied to the: lateral ankle musculature for 0 minutes.      Modality - Ultrasound: (intermittent) 1.5 w/cm2 x 6 min to L lateral ankle (not performed)    Lela participated in neuromuscular  re-education activities to improve: Balance and Proprioception for 0 minutes. The following activities were included:  Lela participated in dynamic functional therapeutic activities to improve functional performance for 0  minutes, including:  Lela participated in gait training to improve functional mobility and safety for 0  minutes, including:      Lela received cold pack for 0 minutes to left ankle --     Home Exercises Provided and Patient Education Provided     Education provided:   - compliance with HEP    Written Home Exercises Provided: Patient instructed to cont prior HEP.    See EMR under Patient Instructions for exercises provided prior visit.    Assessment     Pt tolerated progressing ankle there-ex by adding SLS and increasing resistance on TB exercises. After SLS pt verbalized increase in pain on her L ankle therefore applied CP for pain relief. Pt will continue to benefit from skilled PT to improve her tolerance to weight bearing on L ankle during mid stance phase of gait cycle.     Lela is progressing well towards her goals.   Pt prognosis is Good.     Pt will continue to benefit from skilled outpatient physical therapy to address the deficits listed on initial evaluation, provide pt/family education and to maximize pt's level of independence in the home and community environment.     Pt's spiritual, cultural and educational needs considered and pt agreeable to plan of care and goals.     Anticipated barriers to physical therapy: none anticipated     Short Term Goals   ·   1a. Patient will demonstrate initial HEP with use of handouts prn in order to optimize Rx outcomes in order to maximize functional mob. in 5 visits.  -In progress 8/14/2019    ·   2a. Patient will relate pain </= 6/10 over the previous 7 days in order to tolerate/improve WB and improve gt quality, day to day tasks, home and community mobility in 8 visits.-In progress 8/14/2019   ·   ·   3a. Patient will improve L  ankle ROM for DF to 10 deg (initial 5 deg), for PF to 35 deg. (initial 25 deg), inversion to 30 deg (initial 25 deg), and Eversion to neutral/0 deg (initial -5 deg) in order to improve gt quality and promote a more normal gt pattern in 8 visits.  -In progress 8/14/2019  ·   ·   4. Patient will have decreased L ankle edema to 57.5 cm fig-8 measure (initial 58.0cm) in order to improve ROM and promote a normal gt pattern in 8 visits.  -In progress 8/14/2019  ·  5a. Patient will amb x 7 minutes with an RPE </= 6 on the modified FELIX scale in order to improve household and community amb in 8 visits.   -In progress 8/14/2019    ·   ·   6a. Patient will improve functional score to 30% (initial 23.75%) as evidenced by the LEFS in order to improve his house hold/community mobility, ADLS and amb in 8 visits.  -In progress   ·   ·   7a. Patient will improve Tinetti gt and bal score to 23/28 in order to improve gt quality and decrease fall risk in 8 visits.  -In progress 8/14/2019    Long Term Goals   ·   1b. Patient will demonstrate final HEPwith use of handouts prn in order to cont to maximize functional mob. by discharge.  -In progress   ·   ·   2b. Patient will relate pain </= 3/10 over the previous 7 days in order to tolerate/improve WB and improve gt quality, day to day tasks, home and community mobility by discharge. -In progress   ·   ·   3b. Patient will improve L ankle ROM for PF to 45 deg. (initial 25 deg), inversion to 35 deg (initial 25 deg), and Eversion to 15 deg (initial -5 deg) in order to improve gt quality and promote a more normal gt pattern by discharge. -In progress   ·   ·   5b. Patient will amb x 10 minutes with an RPE </= 4 on the modified FELIX scale in order to improve household and community amb by discharge.  -In progress   ·    ·   6b. Patient will improve functional score to 40% (initial 23.75%) as evidenced by the LEFS in order to improve his house hold/community mobility, ADLS and amb by  discharge.-In progress   ·   ·   7b. Patient will improve Tinetti gt and bal score to >/= 24/28 in order to improve gt quality and decrease fall risk by discharge.  -In progress   ·   ·   8.  Patient will improve MMT grades for L ankle/foot DF to 5/5 (initial 4+/5), In to 5/5 (initial 4+/5) and Ev to 5/5 (initial 4+/5) in order to improve activity carmelina and promote a more mormal gt pattern by discharge.  -In progress   ·   ·   9.  Patient will decrease TUG score </= 13 seconds in orderr to decrease fall risk by discharge.  -In progress   ·     Plan     Continue with current POC and progress as tolerated by the patient. Add BAPS board as per pts tolerance and improve SLS balance.     Faith Jones, PTA

## 2019-08-19 ENCOUNTER — CLINICAL SUPPORT (OUTPATIENT)
Dept: REHABILITATION | Facility: HOSPITAL | Age: 64
End: 2019-08-19
Payer: MEDICAID

## 2019-08-19 DIAGNOSIS — M25.572 ACUTE LEFT ANKLE PAIN: Primary | ICD-10-CM

## 2019-08-19 PROCEDURE — 97530 THERAPEUTIC ACTIVITIES: CPT

## 2019-08-19 PROCEDURE — 97110 THERAPEUTIC EXERCISES: CPT

## 2019-08-19 NOTE — PROGRESS NOTES
Physical Therapy Daily Treatment Note     Name: Lela COLEMAN [facility] Number: [id number]    Therapy Diagnosis:   Encounter Diagnosis   Name Primary?    Acute left ankle pain Yes     Physician: Atiya Rosas DPM    Visit Date: 8/19/2019    Physician Orders: PT eval and treat   Medical Diagnosis: pain in L ankle, sprain  Evaluation Date: 07/1619  Authorization Period Expiration: 09/13/19  Plan of Care Certification Period: 10/16/19  Visit #/Visits authorized: 7/ 12    PTA visit: 0/5     Monthly Assessment performed on 8/19/19, next assessment due on or before 9/19/19    Time In: 09:00  Time Out: 10:10  Total Billable Time: 58 minutes    Precautions: Standard and Fall    Subjective     Pt reports: Feeling okay this day and with 2/10 pain to L ankle, she adds that the worst her pain has been over the previous week was ~5/10.    .  She was compliant with home exercise program.  Response to previous treatment: good  Functional change: decreased pain, slightly easier to get around    Pain: 2/10  Location: left ankle    Objective     Lela received therapeutic exercises to develop strength, endurance, flexibility and ROM for 45 minutes including:    ·   Cardio - Recumbent Bike: 10 minutes     Gastroc Stretch 3 x 30 sec   ·   Ankle - Towel Crunches: 5 x 10 reps  ·   Ankle - Marbles: 3 reps (20 marbles per rep)  ·   Ankle - ABC's ROM: 2 reps (DNP)  ·   Ankle - Circles (ROM): 30 reps x 2 each direction (DNP)  ·   Ankle - AROM - All Directions: 3 sets of 10 /c (OTB)   Ankle- SLS 2 x 30 sec   Ankle- heel raises 3 x 10 reps   + BAPS board x 1 minutes x 2 (DF/PF and In/Ev)   +TM x 7 min (1mph) Reports RPE of 6 on the modified Lisa scale       Lela participated in neuromuscular re-education activities to improve: Balance and Proprioception for 0 minutes. The following activities were included:     + TYRELL  (Add on next visit)        Patient received cold pack for 10 minutes to L ankle following Rx..        Lela  participated in dynamic functional therapeutic activities to improve functional performance for 13  minutes, including:    Reassessment, ROM, girth measurements, functional testing    ANKLE    Impaired Ankle: Left    ROM       Ankle AROM AROM  PROM PROM Status Comment    Left Right  Left Right               Dorsiflexion 10 -        Plantarflexion 35 -        INversion 30 -        EVersion 5 -                       GIRTH:        Ankle   Status Comment    Left Right            Figure 8 56.9 cm N/A                            FUNCTIONAL TESTS       LEFS: 36/80 = 45%     Tinetti gt and bal assessment:22/28.       TUG Score = 15 Seconds    Home Exercises Provided and Patient Education Provided     Education provided:   - compliance with HEP    Written Home Exercises Provided: Patient instructed to cont prior HEP.    See EMR under Patient Instructions for exercises provided prior visit.    Assessment     Patient cont to participate with PT to address her impairments with pain, ROM, activity carmelina and functional mob., which she has made improvements as noted and improved her functional mob to 45% as evidenced on the LEFS.  She continues to req VC/TC for improved quality and performance of TE in order to progress her to her established goals on POC.  She cont with intermittent pain which is declining along with decreased edema, ROM and progressing activity carmelina as noted with her RPE with amb.   Patient has met all of her STG and her LTG are in progress.  She is expected to cont to improve with cont Rx in order to optmize her safe functional mob/Indpendence.        Lela is progressing well towards her goals.   Pt prognosis is Good.     Pt will continue to benefit from skilled outpatient physical therapy to address the deficits listed on initial evaluation, provide pt/family education and to maximize pt's level of independence in the home and community environment.     Pt's spiritual, cultural and educational needs considered  and pt agreeable to plan of care and goals.     Anticipated barriers to physical therapy: none anticipated     Short Term Goals     1a. Patient will demonstrate initial HEP with use of handouts prn in order to optimize Rx outcomes in order to maximize functional mob. in 5 visits.  -MET 8/19/2019    ·   2a. Patient will relate pain </= 6/10 over the previous 7 days in order to tolerate/improve WB and improve gt quality, day to day tasks, home and community mobility in 8 visits. MET 8/19/2019  ·   ·   3a. Patient will improve L ankle ROM for DF to 10 deg (initial 5 deg), for PF to 35 deg. (initial 25 deg), inversion to 30 deg (initial 25 deg), and Eversion to neutral/0 deg (initial -5 deg) in order to improve gt quality and promote a more normal gt pattern in 8 visits.  -MET 8/19/2019  ·   ·   4. Patient will have decreased L ankle edema to 57.5 cm fig-8 measure (initial 58.0cm) in order to improve ROM and promote a normal gt pattern in 8 visits.  MET 8/19/2019  ·    5a. Patient will amb x 7 minutes with an RPE </= 6 on the modified FELIX scale in order to improve household and community amb in 8 visits.   MET 8/19/2019    ·   ·   6a. Patient will improve functional score to 30% (initial 23.75%) as evidenced by the LEFS in order to improve his house hold/community mobility, ADLS and amb in 8 visits.  - MET 8/19/2019  ·   ·     7a. Patient will improve Tinetti gt and bal score to 23/28 in order to improve gt quality and decrease fall risk in 8 visits.  -MET 8/19/2019    Long Term Goals   ·   1b. Patient will demonstrate final HEPwith use of handouts prn in order to cont to maximize functional mob. by discharge.  -In progress   ·   ·   2b. Patient will relate pain </= 3/10 over the previous 7 days in order to tolerate/improve WB and improve gt quality, day to day tasks, home and community mobility by discharge. -In progress   ·   ·   3b. Patient will improve L ankle ROM for PF to 45 deg. (initial 25 deg), inversion to 35  deg (initial 25 deg), and Eversion to 15 deg (initial -5 deg) in order to improve gt quality and promote a more normal gt pattern by discharge. -In progress   ·   ·   5b. Patient will amb x 10 minutes with an RPE </= 4 on the modified FELIX scale in order to improve household and community amb by discharge.  -In progress   ·    ·   6b. Patient will improve functional score to 40% (initial 23.75%) as evidenced by the LEFS in order to improve his house hold/community mobility, ADLS and amb by discharge.-In progress   ·   ·   7b. Patient will improve Tinetti gt and bal score to >/= 24/28 in order to improve gt quality and decrease fall risk by discharge.  -In progress   ·   ·   8.  Patient will improve MMT grades for L ankle/foot DF to 5/5 (initial 4+/5), In to 5/5 (initial 4+/5) and Ev to 5/5 (initial 4+/5) in order to improve activity carmelina and promote a more mormal gt pattern by discharge.  -In progress   ·   ·   9.  Patient will decrease TUG score </= 13 seconds in orderr to decrease fall risk by discharge.  -In progress   ·     Plan     Continue with current POC and progress as tolerated by the patient. Add TYRELL exs as per pts tolerance, monitor RPE with prolonged amb. and improve SLS balance.     Gianni Ortiz, PT

## 2019-08-22 ENCOUNTER — DOCUMENTATION ONLY (OUTPATIENT)
Dept: REHABILITATION | Facility: HOSPITAL | Age: 64
End: 2019-08-22

## 2019-08-26 ENCOUNTER — CLINICAL SUPPORT (OUTPATIENT)
Dept: REHABILITATION | Facility: HOSPITAL | Age: 64
End: 2019-08-26
Payer: MEDICAID

## 2019-08-26 DIAGNOSIS — M25.572 ACUTE LEFT ANKLE PAIN: Primary | ICD-10-CM

## 2019-08-26 PROCEDURE — 97110 THERAPEUTIC EXERCISES: CPT

## 2019-08-26 NOTE — PROGRESS NOTES
Physical Therapy Daily Treatment Note     Name: Lela COLEMAN [facility] Number: [id number]    Therapy Diagnosis:   Encounter Diagnosis   Name Primary?    Acute left ankle pain Yes     Physician: Atiya Rosas DPM    Visit Date: 8/26/2019    Physician Orders: PT eval and treat   Medical Diagnosis: pain in L ankle, sprain  Evaluation Date: 07/1619  Authorization Period Expiration: 09/13/19  Plan of Care Certification Period: 10/16/19  Visit #/Visits authorized: 8/ 12    PTA visit: 0/5     Monthly Assessment performed on 8/19/19, next assessment due on or before 9/19/19    Time In: 09:00  Time Out: 10:10  Total Billable Time: 59 minutes    Precautions: Standard and Fall    Subjective     Pt reports: Feeling okay this day and with 2/10 pain to L ankle.  .  She was compliant with home exercise program.  Response to previous treatment: good  Functional change: decreased pain, slightly easier to get around    Pain: 2/10  Location: left ankle    Objective     Lela received therapeutic exercises to develop strength, endurance, flexibility and ROM for 55 minutes including:    ·   Cardio - Recumbent Bike: 10 minutes     Gastroc Stretch 3 x 30 sec (with rope/ return to slant board on next visit)  ·   Ankle - Towel Crunches: 5 x 10 reps  ·   Ankle - Marbles: 3 reps (20 marbles per rep)  ·   Ankle - ABC's ROM: 2 reps (DNP)  ·   Ankle - Circles (ROM): 30 reps x 2 each direction (DNP)  ·   Ankle - AROM - All Directions: 3 sets of 10 /c (GTB)   Ankle- SLS 2 x 30 sec   Ankle- heel raises 3 x 10 reps   BAPS board x 1 minutes x 2 (DF/PF and In/Ev) Level 2   TM x 7 min (1mph) Reports RPE of 6 on the modified Lisa scale.       Lela participated in neuromuscular re-education activities to improve: Balance and Proprioception for 4 minutes. The following activities were included:     + TYRELL 30 seconds x 3 on firm surface.      Patient received cold pack for 10 minutes to L ankle following Rx..(DNP Patient  declined)        Home Exercises Provided and Patient Education Provided     Education provided:   - compliance with HEP    Written Home Exercises Provided: Patient instructed to cont prior HEP.    See EMR under Patient Instructions for exercises provided prior visit.    Assessment     Patient was able to carmelina Rx with no c/o of increased pain but did require VC/TC for setup, improved quality, and performance.  She additionaly  Was able to carmelina progress with GTB and with dynamic bal TE with TYRELL.  She is expected to progress with cont Rx in order to optimize her functiona mob and obtain her established goals set on POC.        Lela is progressing well towards her goals.   Pt prognosis is Good.     Pt will continue to benefit from skilled outpatient physical therapy to address the deficits listed on initial evaluation, provide pt/family education and to maximize pt's level of independence in the home and community environment.     Pt's spiritual, cultural and educational needs considered and pt agreeable to plan of care and goals.     Anticipated barriers to physical therapy: none anticipated     Short Term Goals     1a. Patient will demonstrate initial HEP with use of handouts prn in order to optimize Rx outcomes in order to maximize functional mob. in 5 visits.  -MET 8/26/2019    ·   2a. Patient will relate pain </= 6/10 over the previous 7 days in order to tolerate/improve WB and improve gt quality, day to day tasks, home and community mobility in 8 visits. MET 8/26/2019  ·   ·   3a. Patient will improve L ankle ROM for DF to 10 deg (initial 5 deg), for PF to 35 deg. (initial 25 deg), inversion to 30 deg (initial 25 deg), and Eversion to neutral/0 deg (initial -5 deg) in order to improve gt quality and promote a more normal gt pattern in 8 visits.  -MET 8/26/2019  ·   ·   4. Patient will have decreased L ankle edema to 57.5 cm fig-8 measure (initial 58.0cm) in order to improve ROM and promote a normal gt pattern  in 8 visits.  MET 8/26/2019  ·    5a. Patient will amb x 7 minutes with an RPE </= 6 on the modified FELIX scale in order to improve household and community amb in 8 visits.   MET 8/26/2019    ·   ·   6a. Patient will improve functional score to 30% (initial 23.75%) as evidenced by the LEFS in order to improve his house hold/community mobility, ADLS and amb in 8 visits.  - MET 8/26/2019  ·   ·     7a. Patient will improve Tinetti gt and bal score to 23/28 in order to improve gt quality and decrease fall risk in 8 visits.  -MET 8/26/2019    Long Term Goals   ·   1b. Patient will demonstrate final HEPwith use of handouts prn in order to cont to maximize functional mob. by discharge.  -In progress   ·   ·   2b. Patient will relate pain </= 3/10 over the previous 7 days in order to tolerate/improve WB and improve gt quality, day to day tasks, home and community mobility by discharge. -In progress   ·   ·   3b. Patient will improve L ankle ROM for PF to 45 deg. (initial 25 deg), inversion to 35 deg (initial 25 deg), and Eversion to 15 deg (initial -5 deg) in order to improve gt quality and promote a more normal gt pattern by discharge. -In progress   ·   ·   5b. Patient will amb x 10 minutes with an RPE </= 4 on the modified FELIX scale in order to improve household and community amb by discharge.  -In progress   ·    ·   6b. Patient will improve functional score to 40% (initial 23.75%) as evidenced by the LEFS in order to improve his house hold/community mobility, ADLS and amb by discharge.-In progress   ·   ·   7b. Patient will improve Tinetti gt and bal score to >/= 24/28 in order to improve gt quality and decrease fall risk by discharge.  -In progress   ·   ·   8.  Patient will improve MMT grades for L ankle/foot DF to 5/5 (initial 4+/5), In to 5/5 (initial 4+/5) and Ev to 5/5 (initial 4+/5) in order to improve activity carmelina and promote a more mormal gt pattern by discharge.  -In progress   ·   ·   9.  Patient will  decrease TUG score </= 13 seconds in order to decrease fall risk by discharge.  -In progress   ·     Plan     Continue with current POC and progress as tolerated, monitor RPE with prolonged amb. and improve SLS balance.     Gianni Ortiz, PT

## 2019-10-09 DIAGNOSIS — N95.9 MENOPAUSAL AND PERIMENOPAUSAL DISORDER: Primary | ICD-10-CM

## 2020-09-17 ENCOUNTER — OFFICE VISIT (OUTPATIENT)
Dept: URGENT CARE | Facility: CLINIC | Age: 65
End: 2020-09-17
Payer: MEDICAID

## 2020-09-17 VITALS
DIASTOLIC BLOOD PRESSURE: 70 MMHG | TEMPERATURE: 97 F | BODY MASS INDEX: 37.39 KG/M2 | HEIGHT: 64 IN | WEIGHT: 219 LBS | OXYGEN SATURATION: 96 % | SYSTOLIC BLOOD PRESSURE: 126 MMHG | HEART RATE: 83 BPM

## 2020-09-17 DIAGNOSIS — M25.461 KNEE EFFUSION, RIGHT: ICD-10-CM

## 2020-09-17 DIAGNOSIS — M25.561 ACUTE PAIN OF RIGHT KNEE: Primary | ICD-10-CM

## 2020-09-17 PROCEDURE — 73564 PR  X-RAY KNEE 4+ VIEW: ICD-10-PCS | Mod: RT,S$GLB,, | Performed by: NURSE PRACTITIONER

## 2020-09-17 PROCEDURE — 99204 OFFICE O/P NEW MOD 45 MIN: CPT | Mod: S$GLB,,, | Performed by: NURSE PRACTITIONER

## 2020-09-17 PROCEDURE — 73564 X-RAY EXAM KNEE 4 OR MORE: CPT | Mod: RT,S$GLB,, | Performed by: NURSE PRACTITIONER

## 2020-09-17 PROCEDURE — 99204 PR OFFICE/OUTPT VISIT, NEW, LEVL IV, 45-59 MIN: ICD-10-PCS | Mod: S$GLB,,, | Performed by: NURSE PRACTITIONER

## 2020-09-17 NOTE — PATIENT INSTRUCTIONS
Water on the Knee    Water on the knee is also known as knee effusion. The knee joint normally has less than 1 ounce of fluid. Injury or inflammation of the knee joint causes extra fluid to collect there. When this happens, the knee joint looks swollen and is usually painful. It may be hard to fully bend the knee.  The most common cause of water on the knee is osteoarthritis due to wear and tear on the joint cartilage. Other causes include injury to the cartilage, inflammatory arthritis such as gout or rheumatoid arthritis, and infection of the joint.  You may need a needle aspiration, if the cause of your water on the knee is not certain. This procedure removes a sample of joint fluid from the knee for testing. This is done with a local anesthetic. Removing excess fluid may also relieve swelling and pain.  Home care  · Limit your activities. Stay off the injured leg as much as possible until pain improves.  · Keep your leg elevated to reduce pain and swelling. When sleeping, place a pillow under the injured leg. When sitting, support the injured leg so it is level with your waist. This is very important during the first 48 hours.  · Apply an ice pack over the injured area for 15 to 20 minutes every 3 to 6 hours. You should do this for the first 24 to 48 hours. You can make an ice pack by filling a plastic bag that seals at the top with ice cubes and then wrapping it with a thin towel. Continue to use ice packs for relief of pain and swelling as needed. As the ice melts, be careful to avoid getting your wrap, splint, or cast wet. After 48 hours, apply heat(warm shower or warm bath) for 15 to 20 minutes several times a day, or alternate ice and heat. If you have to wear a hook-and-loop knee brace, you can open it to apply the ice pack, or heat, directly to the knee. Never put ice directly on the skin. Always wrap the ice in a towel or other type of cloth.  · You may use over-the-counter pain medicine to control  pain, unless another pain medicine was prescribed. If you have chronic liver or kidney disease or have ever had a stomach ulcer or GI bleeding, talk with your healthcare provider before using these medicines.  · If crutches or a walker have been recommended, do not put weight on the injured leg until you can do so without pain. Check with your healthcare provider before returning to sports or full work duties.  · If you have a hook-and-loop knee brace, you can remove it to bathe and sleep, unless told otherwise.  Follow-up care  Follow up with your healthcare provider as advised.  If you are overweight, talk to your healthcare provider about a weight loss program. The excess weight puts extra strain on your knees.  When to seek medical advice  Call your healthcare provider right away if any of these occur:  · Increasing pain, redness, or swelling of the knee  · Fever of 100.4°F (38°C) or above lasting for 24 to 48 hours  Date Last Reviewed: 11/23/2015  © 7668-0334 [employer]. [address] [place] [address]. All rights reserved. This information is not intended as a substitute for professional medical care. Always follow your healthcare professional's instructions.        Reducing Knee Pain and Swelling    Many treatments can help reduce pain and swelling in your knee. Your healthcare provider or physical therapist may suggest one or more of the following treatments:  · Icing your knee helps reduce swelling. You may be asked to ice your knee once a day or more. Apply ice for about 15 to 20 minutes at a time, with at least 40 minutes between sessions. Always keep a towel between the ice and your skin.   · Keeping your leg raised above your heart helps excess fluid flow out of your knee joint. This reduces swelling.  · Compression means wrapping an elastic bandage or neoprene sleeve snugly around your knees. This keeps fluid from collecting in your knee joint.  · Electrical stimulation, done  by a physical therapist or , can help reduce excess fluid in your knee joint.  · Anti-inflammatory medicines may be prescribed by your healthcare provider. You may take pills or receive injections in your knee.  · Isometric (margie) exercises strengthen the muscles that support your knee joint. They also help reduce excess fluid in your knee.  · Massage helps fluid drain away from your knee.  Date Last Reviewed: 10/13/2015  © 2325-2068 [employer]. [address] [place] [address], [place] [address]. All rights reserved. This information is not intended as a substitute for professional medical care. Always follow your healthcare professional's instructions.

## 2020-09-17 NOTE — PROGRESS NOTES
"Subjective:       Patient ID: Lela Mckinney is a 65 y.o. female.    Vitals:  height is 5' 4" (1.626 m) and weight is 99.3 kg (219 lb). Her oral temperature is 97.3 °F (36.3 °C). Her blood pressure is 126/70 and her pulse is 83. Her oxygen saturation is 96%.     Chief Complaint: Fall    Patient complains of right knee pain and right arm soreness s/p falling over a parking curb 4 days ago. Reports her right knee gave out on her today. Denies LOC.     Fall  The accident occurred 3 to 5 days ago. The fall occurred while walking. The pain is present in the neck, right knee, right shoulder, right foot, left foot, left hip and head. The pain is moderate. Pertinent negatives include no fever, headaches, nausea or vomiting. She has tried nothing for the symptoms.       Constitution: Negative for chills, fatigue and fever.   HENT: Negative for congestion and sore throat.    Neck: Negative for painful lymph nodes.   Cardiovascular: Negative for chest pain and leg swelling.   Eyes: Negative for double vision and blurred vision.   Respiratory: Negative for cough and shortness of breath.    Gastrointestinal: Negative for nausea, vomiting and diarrhea.   Genitourinary: Negative for dysuria, frequency, urgency and history of kidney stones.   Musculoskeletal: Negative for joint pain, joint swelling, muscle cramps and muscle ache.        Right knee pain and right arm soreness   Skin: Negative for color change, pale, rash and bruising.   Allergic/Immunologic: Negative for seasonal allergies.   Neurological: Negative for dizziness, history of vertigo, light-headedness, passing out and headaches.   Hematologic/Lymphatic: Negative for swollen lymph nodes.   Psychiatric/Behavioral: Negative for nervous/anxious, sleep disturbance and depression. The patient is not nervous/anxious.        Objective:      Physical Exam   Constitutional: She is oriented to person, place, and time. She appears well-developed. She is cooperative.  " Non-toxic appearance. She does not appear ill. No distress.       HENT:   Head: Normocephalic and atraumatic.   Ears:   Right Ear: Hearing, tympanic membrane, external ear and ear canal normal.   Left Ear: Hearing, tympanic membrane, external ear and ear canal normal.   Nose: Nose normal. No mucosal edema, rhinorrhea or nasal deformity. No epistaxis. Right sinus exhibits no maxillary sinus tenderness and no frontal sinus tenderness. Left sinus exhibits no maxillary sinus tenderness and no frontal sinus tenderness.   Mouth/Throat: Uvula is midline, oropharynx is clear and moist and mucous membranes are normal. No trismus in the jaw. Normal dentition. No uvula swelling. No posterior oropharyngeal erythema.   Eyes: Conjunctivae and lids are normal. Right eye exhibits no discharge. Left eye exhibits no discharge. No scleral icterus.   Neck: Trachea normal, normal range of motion, full passive range of motion without pain and phonation normal. Neck supple.   Cardiovascular: Normal rate, regular rhythm, normal heart sounds and normal pulses.   Pulmonary/Chest: Effort normal and breath sounds normal. No respiratory distress.   Abdominal: Soft. Normal appearance and bowel sounds are normal. She exhibits no distension, no pulsatile midline mass and no mass. There is no abdominal tenderness.   Musculoskeletal: Normal range of motion.         General: No deformity.      Right knee: She exhibits swelling and bony tenderness. She exhibits normal range of motion, no effusion, no ecchymosis, no deformity, no laceration, no erythema, normal alignment and no LCL laxity. Tenderness found. Patellar tendon tenderness noted.        Arms:    Neurological: She is alert and oriented to person, place, and time. She exhibits normal muscle tone. Coordination normal. GCS eye subscore is 4. GCS verbal subscore is 5. GCS motor subscore is 6.   Skin: Skin is warm, dry, intact, not diaphoretic and not pale. not right kneePsychiatric: Her speech is  normal and behavior is normal. Judgment and thought content normal.   Nursing note and vitals reviewed.        Assessment:       1. Acute pain of right knee    2. Knee effusion, right        Plan:       Small joint effusion noted to right knee on xray. Ace wrap applied. Advised RICE. Follow up with orthopedic. Unable to give NSAIDS due to HTN and diabetes history.     Acute pain of right knee  -     XR KNEE 3 VIEW RIGHT; Future; Expected date: 09/17/2020    Knee effusion, right

## 2020-09-21 ENCOUNTER — TELEPHONE (OUTPATIENT)
Dept: URGENT CARE | Facility: CLINIC | Age: 65
End: 2020-09-21

## 2020-10-06 ENCOUNTER — LAB VISIT (OUTPATIENT)
Dept: PRIMARY CARE CLINIC | Facility: OTHER | Age: 65
End: 2020-10-06
Attending: INTERNAL MEDICINE
Payer: MEDICAID

## 2020-10-06 DIAGNOSIS — R06.02 SHORTNESS OF BREATH: ICD-10-CM

## 2020-10-06 DIAGNOSIS — Z03.818 ENCOUNTER FOR OBSERVATION FOR SUSPECTED EXPOSURE TO OTHER BIOLOGICAL AGENTS RULED OUT: ICD-10-CM

## 2020-10-06 PROCEDURE — U0003 INFECTIOUS AGENT DETECTION BY NUCLEIC ACID (DNA OR RNA); SEVERE ACUTE RESPIRATORY SYNDROME CORONAVIRUS 2 (SARS-COV-2) (CORONAVIRUS DISEASE [COVID-19]), AMPLIFIED PROBE TECHNIQUE, MAKING USE OF HIGH THROUGHPUT TECHNOLOGIES AS DESCRIBED BY CMS-2020-01-R: HCPCS

## 2020-10-07 LAB — SARS-COV-2 RNA RESP QL NAA+PROBE: NOT DETECTED

## 2020-11-17 PROBLEM — M47.812 CERVICAL SPONDYLOSIS WITHOUT MYELOPATHY: Status: ACTIVE | Noted: 2020-11-17

## 2020-11-23 DIAGNOSIS — Z78.0 MENOPAUSE: ICD-10-CM

## 2020-11-23 DIAGNOSIS — Z12.31 ENCOUNTER FOR SCREENING MAMMOGRAM FOR MALIGNANT NEOPLASM OF BREAST: Primary | ICD-10-CM

## 2020-12-17 ENCOUNTER — HOSPITAL ENCOUNTER (OUTPATIENT)
Dept: RADIOLOGY | Facility: HOSPITAL | Age: 65
Discharge: HOME | End: 2020-12-17
Attending: NURSE PRACTITIONER
Payer: MEDICAID

## 2020-12-17 DIAGNOSIS — Z78.0 MENOPAUSE: ICD-10-CM

## 2020-12-17 DIAGNOSIS — Z12.31 ENCOUNTER FOR SCREENING MAMMOGRAM FOR MALIGNANT NEOPLASM OF BREAST: ICD-10-CM

## 2020-12-17 PROCEDURE — 77080 DXA BONE DENSITY AXIAL: CPT | Mod: TC,PO

## 2020-12-17 PROCEDURE — 77067 SCR MAMMO BI INCL CAD: CPT | Mod: TC,PO

## 2021-02-26 ENCOUNTER — OFFICE VISIT (OUTPATIENT)
Dept: URGENT CARE | Facility: CLINIC | Age: 66
End: 2021-02-26
Payer: MEDICAID

## 2021-02-26 VITALS
TEMPERATURE: 99 F | OXYGEN SATURATION: 95 % | RESPIRATION RATE: 16 BRPM | SYSTOLIC BLOOD PRESSURE: 127 MMHG | WEIGHT: 220 LBS | HEIGHT: 64 IN | DIASTOLIC BLOOD PRESSURE: 69 MMHG | HEART RATE: 79 BPM | BODY MASS INDEX: 37.56 KG/M2

## 2021-02-26 DIAGNOSIS — R42 LIGHTHEADEDNESS: Primary | ICD-10-CM

## 2021-02-26 DIAGNOSIS — J32.9 SINUSITIS, UNSPECIFIED CHRONICITY, UNSPECIFIED LOCATION: ICD-10-CM

## 2021-02-26 LAB
BILIRUB UR QL STRIP: NEGATIVE
CTP QC/QA: YES
GLUCOSE UR QL STRIP: NEGATIVE
KETONES UR QL STRIP: NEGATIVE
LEUKOCYTE ESTERASE UR QL STRIP: NEGATIVE
PH, POC UA: 6.5
POC BLOOD, URINE: NEGATIVE
POC NITRATES, URINE: NEGATIVE
PROT UR QL STRIP: NEGATIVE
SARS-COV-2 RDRP RESP QL NAA+PROBE: NEGATIVE
SP GR UR STRIP: 1.02 (ref 1–1.03)
UROBILINOGEN UR STRIP-ACNC: NORMAL (ref 0.1–1.1)

## 2021-02-26 PROCEDURE — 99214 PR OFFICE/OUTPT VISIT, EST, LEVL IV, 30-39 MIN: ICD-10-PCS | Mod: 25,S$GLB,CS, | Performed by: NURSE PRACTITIONER

## 2021-02-26 PROCEDURE — 81003 POCT URINALYSIS, DIPSTICK, AUTOMATED, W/O SCOPE: ICD-10-PCS | Mod: QW,S$GLB,, | Performed by: NURSE PRACTITIONER

## 2021-02-26 PROCEDURE — 81003 URINALYSIS AUTO W/O SCOPE: CPT | Mod: QW,S$GLB,, | Performed by: NURSE PRACTITIONER

## 2021-02-26 PROCEDURE — 87635: ICD-10-PCS | Mod: QW,S$GLB,, | Performed by: NURSE PRACTITIONER

## 2021-02-26 PROCEDURE — 99214 OFFICE O/P EST MOD 30 MIN: CPT | Mod: 25,S$GLB,CS, | Performed by: NURSE PRACTITIONER

## 2021-02-26 PROCEDURE — 87635 SARS-COV-2 COVID-19 AMP PRB: CPT | Mod: QW,S$GLB,, | Performed by: NURSE PRACTITIONER

## 2021-02-26 RX ORDER — AZELASTINE 1 MG/ML
1 SPRAY, METERED NASAL 2 TIMES DAILY
Qty: 30 ML | Refills: 0 | Status: SHIPPED | OUTPATIENT
Start: 2021-02-26 | End: 2022-02-26

## 2021-02-26 RX ORDER — CEFDINIR 300 MG/1
300 CAPSULE ORAL 2 TIMES DAILY
Qty: 20 CAPSULE | Refills: 0 | Status: SHIPPED | OUTPATIENT
Start: 2021-02-26 | End: 2021-03-08

## 2021-02-26 RX ORDER — FLUTICASONE PROPIONATE 50 MCG
1 SPRAY, SUSPENSION (ML) NASAL DAILY
Qty: 16 G | Refills: 0 | Status: SHIPPED | OUTPATIENT
Start: 2021-02-26

## 2021-03-23 ENCOUNTER — OFFICE VISIT (OUTPATIENT)
Dept: URGENT CARE | Facility: CLINIC | Age: 66
End: 2021-03-23
Payer: MEDICAID

## 2021-03-23 VITALS
OXYGEN SATURATION: 95 % | TEMPERATURE: 100 F | RESPIRATION RATE: 16 BRPM | SYSTOLIC BLOOD PRESSURE: 160 MMHG | HEART RATE: 102 BPM | DIASTOLIC BLOOD PRESSURE: 92 MMHG

## 2021-03-23 DIAGNOSIS — U07.1 COVID-19 VIRUS DETECTED: ICD-10-CM

## 2021-03-23 DIAGNOSIS — R05.9 COUGH: ICD-10-CM

## 2021-03-23 DIAGNOSIS — R50.9 FEVER, UNSPECIFIED FEVER CAUSE: Primary | ICD-10-CM

## 2021-03-23 LAB
CTP QC/QA: YES
SARS-COV-2 RDRP RESP QL NAA+PROBE: POSITIVE

## 2021-03-23 PROCEDURE — 99214 PR OFFICE/OUTPT VISIT, EST, LEVL IV, 30-39 MIN: ICD-10-PCS | Mod: S$GLB,,, | Performed by: NURSE PRACTITIONER

## 2021-03-23 PROCEDURE — 87635 PR SARS-COV-2 COVID-19 AMPLIFIED PROBE: ICD-10-PCS | Mod: QW,S$GLB,, | Performed by: NURSE PRACTITIONER

## 2021-03-23 PROCEDURE — 99214 OFFICE O/P EST MOD 30 MIN: CPT | Mod: S$GLB,,, | Performed by: NURSE PRACTITIONER

## 2021-03-23 PROCEDURE — 87635 SARS-COV-2 COVID-19 AMP PRB: CPT | Mod: QW,S$GLB,, | Performed by: NURSE PRACTITIONER

## 2021-03-23 RX ORDER — ACETAMINOPHEN 500 MG
1000 TABLET ORAL
Status: DISCONTINUED | OUTPATIENT
Start: 2021-03-23 | End: 2021-03-30 | Stop reason: HOSPADM

## 2021-03-24 ENCOUNTER — NURSE TRIAGE (OUTPATIENT)
Dept: ADMINISTRATIVE | Facility: CLINIC | Age: 66
End: 2021-03-24

## 2021-03-24 ENCOUNTER — PATIENT MESSAGE (OUTPATIENT)
Dept: ADMINISTRATIVE | Facility: OTHER | Age: 66
End: 2021-03-24

## 2021-03-24 ENCOUNTER — TELEPHONE (OUTPATIENT)
Dept: ADMINISTRATIVE | Facility: CLINIC | Age: 66
End: 2021-03-24

## 2021-03-24 ENCOUNTER — INFUSION (OUTPATIENT)
Dept: INFECTIOUS DISEASES | Facility: HOSPITAL | Age: 66
DRG: 871 | End: 2021-03-24
Attending: NURSE PRACTITIONER
Payer: MEDICAID

## 2021-03-24 VITALS
SYSTOLIC BLOOD PRESSURE: 166 MMHG | HEART RATE: 100 BPM | OXYGEN SATURATION: 98 % | DIASTOLIC BLOOD PRESSURE: 74 MMHG | HEIGHT: 64 IN | BODY MASS INDEX: 37.56 KG/M2 | WEIGHT: 220 LBS | RESPIRATION RATE: 18 BRPM | TEMPERATURE: 100 F

## 2021-03-24 DIAGNOSIS — U07.1 COVID-19: Primary | ICD-10-CM

## 2021-03-24 PROCEDURE — 63600175 PHARM REV CODE 636 W HCPCS: Performed by: NURSE PRACTITIONER

## 2021-03-24 PROCEDURE — M0243 CASIRIVI AND IMDEVI INFUSION: HCPCS | Performed by: NURSE PRACTITIONER

## 2021-03-24 PROCEDURE — 25000003 PHARM REV CODE 250: Performed by: NURSE PRACTITIONER

## 2021-03-24 RX ORDER — EPINEPHRINE 0.3 MG/.3ML
0.3 INJECTION SUBCUTANEOUS
Status: DISCONTINUED | OUTPATIENT
Start: 2021-03-24 | End: 2023-02-04

## 2021-03-24 RX ORDER — ALBUTEROL SULFATE 90 UG/1
2 AEROSOL, METERED RESPIRATORY (INHALATION)
Status: DISCONTINUED | OUTPATIENT
Start: 2021-03-24 | End: 2023-02-04

## 2021-03-24 RX ORDER — ACETAMINOPHEN 325 MG/1
650 TABLET ORAL ONCE AS NEEDED
Status: DISCONTINUED | OUTPATIENT
Start: 2021-03-24 | End: 2023-02-04

## 2021-03-24 RX ORDER — ONDANSETRON 4 MG/1
4 TABLET, ORALLY DISINTEGRATING ORAL ONCE AS NEEDED
Status: DISCONTINUED | OUTPATIENT
Start: 2021-03-24 | End: 2023-02-04

## 2021-03-24 RX ORDER — DIPHENHYDRAMINE HYDROCHLORIDE 50 MG/ML
25 INJECTION INTRAMUSCULAR; INTRAVENOUS ONCE AS NEEDED
Status: DISCONTINUED | OUTPATIENT
Start: 2021-03-24 | End: 2023-02-04

## 2021-03-24 RX ORDER — SODIUM CHLORIDE 0.9 % (FLUSH) 0.9 %
10 SYRINGE (ML) INJECTION
Status: DISCONTINUED | OUTPATIENT
Start: 2021-03-24 | End: 2023-02-04

## 2021-03-24 RX ADMIN — CASIRIVIMAB: 1332 INJECTION, SOLUTION, CONCENTRATE INTRAVENOUS at 09:03

## 2021-03-24 RX ADMIN — SODIUM CHLORIDE: 0.9 INJECTION, SOLUTION INTRAVENOUS at 09:03

## 2021-03-25 ENCOUNTER — HOSPITAL ENCOUNTER (INPATIENT)
Facility: HOSPITAL | Age: 66
LOS: 5 days | Discharge: HOME HEALTH | DRG: 871 | End: 2021-03-30
Attending: EMERGENCY MEDICINE | Admitting: INTERNAL MEDICINE
Payer: MEDICAID

## 2021-03-25 DIAGNOSIS — J12.82 PNEUMONIA DUE TO COVID-19 VIRUS: ICD-10-CM

## 2021-03-25 DIAGNOSIS — U07.1 PNEUMONIA DUE TO COVID-19 VIRUS: ICD-10-CM

## 2021-03-25 DIAGNOSIS — R79.89 ELEVATED TROPONIN: ICD-10-CM

## 2021-03-25 DIAGNOSIS — J32.9 SINUSITIS, UNSPECIFIED CHRONICITY, UNSPECIFIED LOCATION: ICD-10-CM

## 2021-03-25 DIAGNOSIS — R06.02 SOB (SHORTNESS OF BREATH): ICD-10-CM

## 2021-03-25 DIAGNOSIS — J96.01 ACUTE HYPOXEMIC RESPIRATORY FAILURE: ICD-10-CM

## 2021-03-25 DIAGNOSIS — U07.1 COVID-19 VIRUS INFECTION: ICD-10-CM

## 2021-03-25 DIAGNOSIS — R09.02 HYPOXIA: Primary | ICD-10-CM

## 2021-03-25 DIAGNOSIS — Z20.822 SUSPECTED COVID-19 VIRUS INFECTION: ICD-10-CM

## 2021-03-25 PROBLEM — Z71.89 GOALS OF CARE, COUNSELING/DISCUSSION: Status: ACTIVE | Noted: 2021-03-25

## 2021-03-25 PROBLEM — K21.9 GERD (GASTROESOPHAGEAL REFLUX DISEASE): Status: ACTIVE | Noted: 2021-03-25

## 2021-03-25 LAB
ALBUMIN SERPL BCP-MCNC: 3.1 G/DL (ref 3.5–5.2)
ALP SERPL-CCNC: 67 U/L (ref 55–135)
ALT SERPL W/O P-5'-P-CCNC: 14 U/L (ref 10–44)
ANION GAP SERPL CALC-SCNC: 12 MMOL/L (ref 8–16)
AORTIC ROOT ANNULUS: 2.98 CM
AORTIC VALVE CUSP SEPERATION: 1.69 CM
AST SERPL-CCNC: 19 U/L (ref 10–40)
AV INDEX (PROSTH): 0.86
AV MEAN GRADIENT: 3 MMHG
AV PEAK GRADIENT: 6 MMHG
AV VALVE AREA: 2.64 CM2
AV VELOCITY RATIO: 0.79
BACTERIA #/AREA URNS HPF: ABNORMAL /HPF
BASOPHILS # BLD AUTO: 0.04 K/UL (ref 0–0.2)
BASOPHILS NFR BLD: 0.2 % (ref 0–1.9)
BILIRUB SERPL-MCNC: 1.1 MG/DL (ref 0.1–1)
BILIRUB UR QL STRIP: NEGATIVE
BNP SERPL-MCNC: 272 PG/ML (ref 0–99)
BSA FOR ECHO PROCEDURE: 2.12 M2
BUN SERPL-MCNC: 14 MG/DL (ref 8–23)
CALCIUM SERPL-MCNC: 8.4 MG/DL (ref 8.7–10.5)
CHLORIDE SERPL-SCNC: 106 MMOL/L (ref 95–110)
CK SERPL-CCNC: 64 U/L (ref 20–180)
CLARITY UR: ABNORMAL
CO2 SERPL-SCNC: 19 MMOL/L (ref 23–29)
COLOR UR: YELLOW
CREAT SERPL-MCNC: 1.2 MG/DL (ref 0.5–1.4)
CRP SERPL-MCNC: 246.6 MG/L (ref 0–8.2)
CV ECHO LV RWT: 0.51 CM
D DIMER PPP IA.FEU-MCNC: 3.71 MG/L FEU
DIFFERENTIAL METHOD: ABNORMAL
DOP CALC AO PEAK VEL: 1.2 M/S
DOP CALC AO VTI: 28.12 CM
DOP CALC LVOT AREA: 3.1 CM2
DOP CALC LVOT DIAMETER: 1.98 CM
DOP CALC LVOT PEAK VEL: 0.95 M/S
DOP CALC LVOT STROKE VOLUME: 74.29 CM3
DOP CALCLVOT PEAK VEL VTI: 24.14 CM
E WAVE DECELERATION TIME: 195.9 MSEC
E/A RATIO: 1.41
E/E' RATIO: 13.38 M/S
ECHO LV POSTERIOR WALL: 1.15 CM (ref 0.6–1.1)
EOSINOPHIL # BLD AUTO: 0 K/UL (ref 0–0.5)
EOSINOPHIL NFR BLD: 0.1 % (ref 0–8)
ERYTHROCYTE [DISTWIDTH] IN BLOOD BY AUTOMATED COUNT: 12 % (ref 11.5–14.5)
EST. GFR  (AFRICAN AMERICAN): 54 ML/MIN/1.73 M^2
EST. GFR  (NON AFRICAN AMERICAN): 47 ML/MIN/1.73 M^2
FERRITIN SERPL-MCNC: 301 NG/ML (ref 20–300)
FRACTIONAL SHORTENING: 29 % (ref 28–44)
GLUCOSE SERPL-MCNC: 169 MG/DL (ref 70–110)
GLUCOSE UR QL STRIP: NEGATIVE
HCT VFR BLD AUTO: 41.1 % (ref 37–48.5)
HGB BLD-MCNC: 13.6 G/DL (ref 12–16)
HGB UR QL STRIP: ABNORMAL
HYALINE CASTS #/AREA URNS LPF: 0 /LPF
IMM GRANULOCYTES # BLD AUTO: 0.09 K/UL (ref 0–0.04)
IMM GRANULOCYTES NFR BLD AUTO: 0.5 % (ref 0–0.5)
INR PPP: 1.1 (ref 0.8–1.2)
INTERVENTRICULAR SEPTUM: 1.24 CM (ref 0.6–1.1)
IVRT: 76.12 MSEC
KETONES UR QL STRIP: NEGATIVE
LA MAJOR: 5.57 CM
LA MINOR: 4.9 CM
LA WIDTH: 3.55 CM
LACTATE SERPL-SCNC: 1.8 MMOL/L (ref 0.5–2.2)
LDH SERPL L TO P-CCNC: 196 U/L (ref 110–260)
LEFT ATRIUM SIZE: 3.57 CM
LEFT ATRIUM VOLUME INDEX: 27.5 ML/M2
LEFT ATRIUM VOLUME: 56.16 CM3
LEFT INTERNAL DIMENSION IN SYSTOLE: 3.2 CM (ref 2.1–4)
LEFT VENTRICLE DIASTOLIC VOLUME INDEX: 45.19 ML/M2
LEFT VENTRICLE DIASTOLIC VOLUME: 92.18 ML
LEFT VENTRICLE MASS INDEX: 96 G/M2
LEFT VENTRICLE SYSTOLIC VOLUME INDEX: 20.1 ML/M2
LEFT VENTRICLE SYSTOLIC VOLUME: 40.99 ML
LEFT VENTRICULAR INTERNAL DIMENSION IN DIASTOLE: 4.49 CM (ref 3.5–6)
LEFT VENTRICULAR MASS: 196.24 G
LEUKOCYTE ESTERASE UR QL STRIP: ABNORMAL
LV LATERAL E/E' RATIO: 13.38 M/S
LV SEPTAL E/E' RATIO: 13.38 M/S
LYMPHOCYTES # BLD AUTO: 1.2 K/UL (ref 1–4.8)
LYMPHOCYTES NFR BLD: 6.5 % (ref 18–48)
MCH RBC QN AUTO: 30 PG (ref 27–31)
MCHC RBC AUTO-ENTMCNC: 33.1 G/DL (ref 32–36)
MCV RBC AUTO: 91 FL (ref 82–98)
MICROSCOPIC COMMENT: ABNORMAL
MONOCYTES # BLD AUTO: 0.7 K/UL (ref 0.3–1)
MONOCYTES NFR BLD: 3.7 % (ref 4–15)
MV MEAN GRADIENT: 1 MMHG
MV PEAK A VEL: 0.76 M/S
MV PEAK E VEL: 1.07 M/S
MV PEAK GRADIENT: 4 MMHG
MV STENOSIS PRESSURE HALF TIME: 100.13 MS
MV VALVE AREA P 1/2 METHOD: 2.2 CM2
NEUTROPHILS # BLD AUTO: 15.8 K/UL (ref 1.8–7.7)
NEUTROPHILS NFR BLD: 89 % (ref 38–73)
NITRITE UR QL STRIP: NEGATIVE
NRBC BLD-RTO: 0 /100 WBC
PH UR STRIP: 6 [PH] (ref 5–8)
PISA MRMAX VEL: 0.04 M/S
PISA RADIUS: 0.27 CM
PISA TR MAX VEL: 2.46 M/S
PISA TR VN NYQUIST: 0 M/S
PLATELET # BLD AUTO: 195 K/UL (ref 150–350)
PMV BLD AUTO: 10.6 FL (ref 9.2–12.9)
POCT GLUCOSE: 196 MG/DL (ref 70–110)
POCT GLUCOSE: 251 MG/DL (ref 70–110)
POCT GLUCOSE: 272 MG/DL (ref 70–110)
POCT GLUCOSE: 302 MG/DL (ref 70–110)
POTASSIUM SERPL-SCNC: 3.7 MMOL/L (ref 3.5–5.1)
PROCALCITONIN SERPL IA-MCNC: 1.48 NG/ML
PROT SERPL-MCNC: 7.4 G/DL (ref 6–8.4)
PROT UR QL STRIP: ABNORMAL
PROTHROMBIN TIME: 11.5 SEC (ref 9–12.5)
PV PEAK VELOCITY: 0.88 CM/S
RA MAJOR: 5 CM
RA PRESSURE: 3 MMHG
RA WIDTH: 2.75 CM
RBC # BLD AUTO: 4.54 M/UL (ref 4–5.4)
RBC #/AREA URNS HPF: 1 /HPF (ref 0–4)
RIGHT VENTRICULAR END-DIASTOLIC DIMENSION: 3.04 CM
RV TISSUE DOPPLER FREE WALL SYSTOLIC VELOCITY 1 (APICAL 4 CHAMBER VIEW): 12.21 CM/S
SODIUM SERPL-SCNC: 137 MMOL/L (ref 136–145)
SP GR UR STRIP: >=1.03 (ref 1–1.03)
SQUAMOUS #/AREA URNS HPF: 1 /HPF
STJ: 2.93 CM
TDI LATERAL: 0.08 M/S
TDI SEPTAL: 0.08 M/S
TDI: 0.08 M/S
TR MAX PG: 24 MMHG
TRICUSPID ANNULAR PLANE SYSTOLIC EXCURSION: 2.38 CM
TROPONIN I SERPL DL<=0.01 NG/ML-MCNC: 0.74 NG/ML (ref 0–0.03)
TROPONIN I SERPL DL<=0.01 NG/ML-MCNC: 1.39 NG/ML (ref 0–0.03)
TROPONIN I SERPL DL<=0.01 NG/ML-MCNC: 1.65 NG/ML (ref 0–0.03)
TV REST PULMONARY ARTERY PRESSURE: 27 MMHG
URN SPEC COLLECT METH UR: ABNORMAL
UROBILINOGEN UR STRIP-ACNC: ABNORMAL EU/DL
WBC # BLD AUTO: 17.79 K/UL (ref 3.9–12.7)
WBC #/AREA URNS HPF: 80 /HPF (ref 0–5)

## 2021-03-25 PROCEDURE — 87077 CULTURE AEROBIC IDENTIFY: CPT | Mod: 59 | Performed by: EMERGENCY MEDICINE

## 2021-03-25 PROCEDURE — 96375 TX/PRO/DX INJ NEW DRUG ADDON: CPT

## 2021-03-25 PROCEDURE — 25000003 PHARM REV CODE 250: Performed by: SPECIALIST

## 2021-03-25 PROCEDURE — 85610 PROTHROMBIN TIME: CPT | Performed by: NURSE PRACTITIONER

## 2021-03-25 PROCEDURE — 93010 ELECTROCARDIOGRAM REPORT: CPT | Mod: ,,, | Performed by: SPECIALIST

## 2021-03-25 PROCEDURE — 25000003 PHARM REV CODE 250: Performed by: EMERGENCY MEDICINE

## 2021-03-25 PROCEDURE — 87077 CULTURE AEROBIC IDENTIFY: CPT | Performed by: NURSE PRACTITIONER

## 2021-03-25 PROCEDURE — 83605 ASSAY OF LACTIC ACID: CPT | Performed by: EMERGENCY MEDICINE

## 2021-03-25 PROCEDURE — 81000 URINALYSIS NONAUTO W/SCOPE: CPT | Performed by: NURSE PRACTITIONER

## 2021-03-25 PROCEDURE — 63600175 PHARM REV CODE 636 W HCPCS: Performed by: NURSE PRACTITIONER

## 2021-03-25 PROCEDURE — 27100098 HC SPACER

## 2021-03-25 PROCEDURE — 83880 ASSAY OF NATRIURETIC PEPTIDE: CPT | Performed by: NURSE PRACTITIONER

## 2021-03-25 PROCEDURE — 82728 ASSAY OF FERRITIN: CPT | Performed by: EMERGENCY MEDICINE

## 2021-03-25 PROCEDURE — 84484 ASSAY OF TROPONIN QUANT: CPT | Performed by: EMERGENCY MEDICINE

## 2021-03-25 PROCEDURE — 87186 SC STD MICRODIL/AGAR DIL: CPT | Performed by: NURSE PRACTITIONER

## 2021-03-25 PROCEDURE — 93010 EKG 12-LEAD: ICD-10-PCS | Mod: ,,, | Performed by: SPECIALIST

## 2021-03-25 PROCEDURE — 93005 ELECTROCARDIOGRAM TRACING: CPT

## 2021-03-25 PROCEDURE — 94640 AIRWAY INHALATION TREATMENT: CPT

## 2021-03-25 PROCEDURE — 85379 FIBRIN DEGRADATION QUANT: CPT | Performed by: NURSE PRACTITIONER

## 2021-03-25 PROCEDURE — 87088 URINE BACTERIA CULTURE: CPT | Performed by: NURSE PRACTITIONER

## 2021-03-25 PROCEDURE — 80053 COMPREHEN METABOLIC PANEL: CPT | Performed by: EMERGENCY MEDICINE

## 2021-03-25 PROCEDURE — 94761 N-INVAS EAR/PLS OXIMETRY MLT: CPT

## 2021-03-25 PROCEDURE — 63600175 PHARM REV CODE 636 W HCPCS: Performed by: EMERGENCY MEDICINE

## 2021-03-25 PROCEDURE — 36415 COLL VENOUS BLD VENIPUNCTURE: CPT | Performed by: EMERGENCY MEDICINE

## 2021-03-25 PROCEDURE — 82550 ASSAY OF CK (CPK): CPT | Performed by: EMERGENCY MEDICINE

## 2021-03-25 PROCEDURE — 86140 C-REACTIVE PROTEIN: CPT | Performed by: EMERGENCY MEDICINE

## 2021-03-25 PROCEDURE — 99291 CRITICAL CARE FIRST HOUR: CPT | Mod: 25

## 2021-03-25 PROCEDURE — 25000003 PHARM REV CODE 250: Performed by: NURSE PRACTITIONER

## 2021-03-25 PROCEDURE — 27000221 HC OXYGEN, UP TO 24 HOURS

## 2021-03-25 PROCEDURE — 87086 URINE CULTURE/COLONY COUNT: CPT | Performed by: NURSE PRACTITIONER

## 2021-03-25 PROCEDURE — 96374 THER/PROPH/DIAG INJ IV PUSH: CPT

## 2021-03-25 PROCEDURE — 20000000 HC ICU ROOM

## 2021-03-25 PROCEDURE — 25000242 PHARM REV CODE 250 ALT 637 W/ HCPCS: Performed by: NURSE PRACTITIONER

## 2021-03-25 PROCEDURE — 36415 COLL VENOUS BLD VENIPUNCTURE: CPT | Performed by: NURSE PRACTITIONER

## 2021-03-25 PROCEDURE — 87186 SC STD MICRODIL/AGAR DIL: CPT | Mod: 59 | Performed by: EMERGENCY MEDICINE

## 2021-03-25 PROCEDURE — 83615 LACTATE (LD) (LDH) ENZYME: CPT | Performed by: EMERGENCY MEDICINE

## 2021-03-25 PROCEDURE — 84145 PROCALCITONIN (PCT): CPT | Performed by: NURSE PRACTITIONER

## 2021-03-25 PROCEDURE — 85025 COMPLETE CBC W/AUTO DIFF WBC: CPT | Performed by: EMERGENCY MEDICINE

## 2021-03-25 PROCEDURE — 87040 BLOOD CULTURE FOR BACTERIA: CPT | Performed by: EMERGENCY MEDICINE

## 2021-03-25 PROCEDURE — 84484 ASSAY OF TROPONIN QUANT: CPT | Mod: 91 | Performed by: NURSE PRACTITIONER

## 2021-03-25 RX ORDER — TRAZODONE HYDROCHLORIDE 50 MG/1
50 TABLET ORAL NIGHTLY
Status: DISCONTINUED | OUTPATIENT
Start: 2021-03-25 | End: 2021-03-30 | Stop reason: HOSPADM

## 2021-03-25 RX ORDER — IBUPROFEN 400 MG/1
400 TABLET ORAL
Status: COMPLETED | OUTPATIENT
Start: 2021-03-25 | End: 2021-03-25

## 2021-03-25 RX ORDER — LANOLIN ALCOHOL/MO/W.PET/CERES
800 CREAM (GRAM) TOPICAL
Status: DISCONTINUED | OUTPATIENT
Start: 2021-03-25 | End: 2021-03-30 | Stop reason: HOSPADM

## 2021-03-25 RX ORDER — SODIUM CHLORIDE 0.9 % (FLUSH) 0.9 %
10 SYRINGE (ML) INJECTION
Status: DISCONTINUED | OUTPATIENT
Start: 2021-03-25 | End: 2021-03-30 | Stop reason: HOSPADM

## 2021-03-25 RX ORDER — GLUCAGON 1 MG
1 KIT INJECTION
Status: DISCONTINUED | OUTPATIENT
Start: 2021-03-25 | End: 2021-03-30 | Stop reason: HOSPADM

## 2021-03-25 RX ORDER — ACETAMINOPHEN 500 MG
5000 TABLET ORAL DAILY
Status: DISCONTINUED | OUTPATIENT
Start: 2021-03-25 | End: 2021-03-30 | Stop reason: HOSPADM

## 2021-03-25 RX ORDER — DEXAMETHASONE SODIUM PHOSPHATE 4 MG/ML
6 INJECTION, SOLUTION INTRA-ARTICULAR; INTRALESIONAL; INTRAMUSCULAR; INTRAVENOUS; SOFT TISSUE
Status: COMPLETED | OUTPATIENT
Start: 2021-03-25 | End: 2021-03-25

## 2021-03-25 RX ORDER — ATORVASTATIN CALCIUM 20 MG/1
20 TABLET, FILM COATED ORAL DAILY
Status: DISCONTINUED | OUTPATIENT
Start: 2021-03-25 | End: 2021-03-27

## 2021-03-25 RX ORDER — FAMOTIDINE 10 MG/ML
20 INJECTION INTRAVENOUS 2 TIMES DAILY
Status: DISCONTINUED | OUTPATIENT
Start: 2021-03-25 | End: 2021-03-28

## 2021-03-25 RX ORDER — IBUPROFEN 200 MG
24 TABLET ORAL
Status: DISCONTINUED | OUTPATIENT
Start: 2021-03-25 | End: 2021-03-30 | Stop reason: HOSPADM

## 2021-03-25 RX ORDER — INSULIN ASPART 100 [IU]/ML
0-5 INJECTION, SOLUTION INTRAVENOUS; SUBCUTANEOUS
Status: DISCONTINUED | OUTPATIENT
Start: 2021-03-25 | End: 2021-03-30 | Stop reason: HOSPADM

## 2021-03-25 RX ORDER — FLUTICASONE PROPIONATE 50 MCG
1 SPRAY, SUSPENSION (ML) NASAL DAILY
Status: DISCONTINUED | OUTPATIENT
Start: 2021-03-25 | End: 2021-03-30 | Stop reason: HOSPADM

## 2021-03-25 RX ORDER — ASCORBIC ACID 500 MG
500 TABLET ORAL 2 TIMES DAILY
Status: DISCONTINUED | OUTPATIENT
Start: 2021-03-25 | End: 2021-03-30 | Stop reason: HOSPADM

## 2021-03-25 RX ORDER — BENZONATATE 100 MG/1
100 CAPSULE ORAL 3 TIMES DAILY PRN
Status: DISCONTINUED | OUTPATIENT
Start: 2021-03-25 | End: 2021-03-30 | Stop reason: HOSPADM

## 2021-03-25 RX ORDER — ASPIRIN 81 MG/1
81 TABLET ORAL DAILY
Status: DISCONTINUED | OUTPATIENT
Start: 2021-03-26 | End: 2021-03-30 | Stop reason: HOSPADM

## 2021-03-25 RX ORDER — ACETAMINOPHEN 325 MG/1
650 TABLET ORAL EVERY 4 HOURS PRN
Status: DISCONTINUED | OUTPATIENT
Start: 2021-03-25 | End: 2021-03-30 | Stop reason: HOSPADM

## 2021-03-25 RX ORDER — ONDANSETRON 2 MG/ML
4 INJECTION INTRAMUSCULAR; INTRAVENOUS EVERY 8 HOURS PRN
Status: DISCONTINUED | OUTPATIENT
Start: 2021-03-25 | End: 2021-03-30 | Stop reason: HOSPADM

## 2021-03-25 RX ORDER — ONDANSETRON 2 MG/ML
4 INJECTION INTRAMUSCULAR; INTRAVENOUS
Status: COMPLETED | OUTPATIENT
Start: 2021-03-25 | End: 2021-03-25

## 2021-03-25 RX ORDER — ASPIRIN 325 MG
325 TABLET, DELAYED RELEASE (ENTERIC COATED) ORAL ONCE
Status: COMPLETED | OUTPATIENT
Start: 2021-03-25 | End: 2021-03-25

## 2021-03-25 RX ORDER — IBUPROFEN 200 MG
16 TABLET ORAL
Status: DISCONTINUED | OUTPATIENT
Start: 2021-03-25 | End: 2021-03-30 | Stop reason: HOSPADM

## 2021-03-25 RX ORDER — ENOXAPARIN SODIUM 100 MG/ML
40 INJECTION SUBCUTANEOUS EVERY 24 HOURS
Status: DISCONTINUED | OUTPATIENT
Start: 2021-03-25 | End: 2021-03-26

## 2021-03-25 RX ORDER — ALBUTEROL SULFATE 90 UG/1
2 AEROSOL, METERED RESPIRATORY (INHALATION) EVERY 6 HOURS
Status: DISCONTINUED | OUTPATIENT
Start: 2021-03-25 | End: 2021-03-30 | Stop reason: HOSPADM

## 2021-03-25 RX ADMIN — ATORVASTATIN CALCIUM 20 MG: 20 TABLET, FILM COATED ORAL at 08:03

## 2021-03-25 RX ADMIN — ACETAMINOPHEN 650 MG: 325 TABLET ORAL at 04:03

## 2021-03-25 RX ADMIN — ASPIRIN 325 MG: 325 TABLET, COATED ORAL at 09:03

## 2021-03-25 RX ADMIN — ALBUTEROL SULFATE 2 PUFF: 90 AEROSOL, METERED RESPIRATORY (INHALATION) at 07:03

## 2021-03-25 RX ADMIN — IBUPROFEN 400 MG: 400 TABLET, FILM COATED ORAL at 03:03

## 2021-03-25 RX ADMIN — INSULIN ASPART 3 UNITS: 100 INJECTION, SOLUTION INTRAVENOUS; SUBCUTANEOUS at 11:03

## 2021-03-25 RX ADMIN — ALBUTEROL SULFATE 2 PUFF: 90 AEROSOL, METERED RESPIRATORY (INHALATION) at 12:03

## 2021-03-25 RX ADMIN — ONDANSETRON 4 MG: 2 INJECTION INTRAMUSCULAR; INTRAVENOUS at 03:03

## 2021-03-25 RX ADMIN — FLUTICASONE PROPIONATE 50 MCG: 50 SPRAY, METERED NASAL at 08:03

## 2021-03-25 RX ADMIN — POTASSIUM BICARBONATE 50 MEQ: 978 TABLET, EFFERVESCENT ORAL at 01:03

## 2021-03-25 RX ADMIN — AZITHROMYCIN MONOHYDRATE 500 MG: 500 INJECTION, POWDER, LYOPHILIZED, FOR SOLUTION INTRAVENOUS at 04:03

## 2021-03-25 RX ADMIN — Medication 1 EACH: at 08:03

## 2021-03-25 RX ADMIN — CEFTRIAXONE 1 G: 1 INJECTION, SOLUTION INTRAVENOUS at 04:03

## 2021-03-25 RX ADMIN — INSULIN ASPART 2 UNITS: 100 INJECTION, SOLUTION INTRAVENOUS; SUBCUTANEOUS at 09:03

## 2021-03-25 RX ADMIN — OXYCODONE HYDROCHLORIDE AND ACETAMINOPHEN 500 MG: 500 TABLET ORAL at 08:03

## 2021-03-25 RX ADMIN — DEXAMETHASONE SODIUM PHOSPHATE 6 MG: 4 INJECTION, SOLUTION INTRA-ARTICULAR; INTRALESIONAL; INTRAMUSCULAR; INTRAVENOUS; SOFT TISSUE at 03:03

## 2021-03-25 RX ADMIN — FAMOTIDINE 20 MG: 10 INJECTION INTRAVENOUS at 08:03

## 2021-03-25 RX ADMIN — CHOLECALCIFEROL TAB 125 MCG (5000 UNIT) 5000 UNITS: 125 TAB at 08:03

## 2021-03-25 RX ADMIN — INSULIN ASPART 2 UNITS: 100 INJECTION, SOLUTION INTRAVENOUS; SUBCUTANEOUS at 05:03

## 2021-03-25 RX ADMIN — TRAZODONE HYDROCHLORIDE 50 MG: 50 TABLET ORAL at 08:03

## 2021-03-25 RX ADMIN — THERA TABS 1 TABLET: TAB at 08:03

## 2021-03-25 RX ADMIN — ENOXAPARIN SODIUM 40 MG: 40 INJECTION SUBCUTANEOUS at 05:03

## 2021-03-25 RX ADMIN — REMDESIVIR 200 MG: 100 INJECTION, POWDER, LYOPHILIZED, FOR SOLUTION INTRAVENOUS at 06:03

## 2021-03-26 PROBLEM — I21.4 NSTEMI (NON-ST ELEVATED MYOCARDIAL INFARCTION): Status: ACTIVE | Noted: 2021-03-26

## 2021-03-26 PROBLEM — E83.39 HYPOPHOSPHATEMIA: Status: ACTIVE | Noted: 2021-03-26

## 2021-03-26 LAB
25(OH)D3+25(OH)D2 SERPL-MCNC: 33 NG/ML (ref 30–96)
ALBUMIN SERPL BCP-MCNC: 2.7 G/DL (ref 3.5–5.2)
ALP SERPL-CCNC: 56 U/L (ref 55–135)
ALT SERPL W/O P-5'-P-CCNC: 16 U/L (ref 10–44)
ANION GAP SERPL CALC-SCNC: 8 MMOL/L (ref 8–16)
AST SERPL-CCNC: 17 U/L (ref 10–40)
BASOPHILS # BLD AUTO: 0.02 K/UL (ref 0–0.2)
BASOPHILS NFR BLD: 0.1 % (ref 0–1.9)
BILIRUB SERPL-MCNC: 0.3 MG/DL (ref 0.1–1)
BUN SERPL-MCNC: 18 MG/DL (ref 8–23)
CALCIUM SERPL-MCNC: 8.6 MG/DL (ref 8.7–10.5)
CHLORIDE SERPL-SCNC: 104 MMOL/L (ref 95–110)
CK MB SERPL-MCNC: 6.9 NG/ML (ref 0.1–6.5)
CK MB SERPL-RTO: 12.8 % (ref 0–5)
CK SERPL-CCNC: 54 U/L (ref 20–180)
CK SERPL-CCNC: 54 U/L (ref 20–180)
CO2 SERPL-SCNC: 23 MMOL/L (ref 23–29)
CREAT SERPL-MCNC: 1 MG/DL (ref 0.5–1.4)
DIFFERENTIAL METHOD: ABNORMAL
EOSINOPHIL # BLD AUTO: 0 K/UL (ref 0–0.5)
EOSINOPHIL NFR BLD: 0 % (ref 0–8)
ERYTHROCYTE [DISTWIDTH] IN BLOOD BY AUTOMATED COUNT: 12 % (ref 11.5–14.5)
ERYTHROCYTE [SEDIMENTATION RATE] IN BLOOD BY WESTERGREN METHOD: 50 MM/HR (ref 0–20)
EST. GFR  (AFRICAN AMERICAN): >60 ML/MIN/1.73 M^2
EST. GFR  (NON AFRICAN AMERICAN): 59 ML/MIN/1.73 M^2
GLUCOSE SERPL-MCNC: 222 MG/DL (ref 70–110)
HCT VFR BLD AUTO: 43.6 % (ref 37–48.5)
HGB BLD-MCNC: 14.3 G/DL (ref 12–16)
IMM GRANULOCYTES # BLD AUTO: 0.05 K/UL (ref 0–0.04)
IMM GRANULOCYTES NFR BLD AUTO: 0.4 % (ref 0–0.5)
LYMPHOCYTES # BLD AUTO: 0.6 K/UL (ref 1–4.8)
LYMPHOCYTES NFR BLD: 4.6 % (ref 18–48)
MAGNESIUM SERPL-MCNC: 2.1 MG/DL (ref 1.6–2.6)
MCH RBC QN AUTO: 29.7 PG (ref 27–31)
MCHC RBC AUTO-ENTMCNC: 32.8 G/DL (ref 32–36)
MCV RBC AUTO: 91 FL (ref 82–98)
MONOCYTES # BLD AUTO: 0.8 K/UL (ref 0.3–1)
MONOCYTES NFR BLD: 5.7 % (ref 4–15)
NEUTROPHILS # BLD AUTO: 12.3 K/UL (ref 1.8–7.7)
NEUTROPHILS NFR BLD: 89.2 % (ref 38–73)
NRBC BLD-RTO: 0 /100 WBC
PHOSPHATE SERPL-MCNC: 2.4 MG/DL (ref 2.7–4.5)
PLATELET # BLD AUTO: 161 K/UL (ref 150–350)
PMV BLD AUTO: 11 FL (ref 9.2–12.9)
POCT GLUCOSE: 213 MG/DL (ref 70–110)
POCT GLUCOSE: 226 MG/DL (ref 70–110)
POCT GLUCOSE: 294 MG/DL (ref 70–110)
POCT GLUCOSE: 347 MG/DL (ref 70–110)
POTASSIUM SERPL-SCNC: 4.2 MMOL/L (ref 3.5–5.1)
PROT SERPL-MCNC: 6.7 G/DL (ref 6–8.4)
RBC # BLD AUTO: 4.82 M/UL (ref 4–5.4)
SODIUM SERPL-SCNC: 135 MMOL/L (ref 136–145)
TROPONIN I SERPL DL<=0.01 NG/ML-MCNC: 1.11 NG/ML (ref 0–0.03)
WBC # BLD AUTO: 13.79 K/UL (ref 3.9–12.7)

## 2021-03-26 PROCEDURE — 25000003 PHARM REV CODE 250: Performed by: INTERNAL MEDICINE

## 2021-03-26 PROCEDURE — 82306 VITAMIN D 25 HYDROXY: CPT | Performed by: NURSE PRACTITIONER

## 2021-03-26 PROCEDURE — 84100 ASSAY OF PHOSPHORUS: CPT | Performed by: NURSE PRACTITIONER

## 2021-03-26 PROCEDURE — 85651 RBC SED RATE NONAUTOMATED: CPT | Performed by: NURSE PRACTITIONER

## 2021-03-26 PROCEDURE — 63600175 PHARM REV CODE 636 W HCPCS: Performed by: NURSE PRACTITIONER

## 2021-03-26 PROCEDURE — 63600175 PHARM REV CODE 636 W HCPCS: Performed by: INTERNAL MEDICINE

## 2021-03-26 PROCEDURE — 82553 CREATINE MB FRACTION: CPT | Performed by: SPECIALIST

## 2021-03-26 PROCEDURE — 82550 ASSAY OF CK (CPK): CPT | Performed by: SPECIALIST

## 2021-03-26 PROCEDURE — 80053 COMPREHEN METABOLIC PANEL: CPT | Performed by: NURSE PRACTITIONER

## 2021-03-26 PROCEDURE — 94640 AIRWAY INHALATION TREATMENT: CPT

## 2021-03-26 PROCEDURE — 25000003 PHARM REV CODE 250: Performed by: NURSE PRACTITIONER

## 2021-03-26 PROCEDURE — 85025 COMPLETE CBC W/AUTO DIFF WBC: CPT | Performed by: NURSE PRACTITIONER

## 2021-03-26 PROCEDURE — 25000003 PHARM REV CODE 250: Performed by: SPECIALIST

## 2021-03-26 PROCEDURE — 36415 COLL VENOUS BLD VENIPUNCTURE: CPT | Performed by: NURSE PRACTITIONER

## 2021-03-26 PROCEDURE — 20000000 HC ICU ROOM

## 2021-03-26 PROCEDURE — 86140 C-REACTIVE PROTEIN: CPT | Performed by: NURSE PRACTITIONER

## 2021-03-26 PROCEDURE — 83735 ASSAY OF MAGNESIUM: CPT | Performed by: NURSE PRACTITIONER

## 2021-03-26 PROCEDURE — 94761 N-INVAS EAR/PLS OXIMETRY MLT: CPT

## 2021-03-26 PROCEDURE — 84484 ASSAY OF TROPONIN QUANT: CPT | Performed by: SPECIALIST

## 2021-03-26 RX ORDER — SODIUM,POTASSIUM PHOSPHATES 280-250MG
1 POWDER IN PACKET (EA) ORAL
Status: COMPLETED | OUTPATIENT
Start: 2021-03-26 | End: 2021-03-27

## 2021-03-26 RX ORDER — ENOXAPARIN SODIUM 100 MG/ML
40 INJECTION SUBCUTANEOUS EVERY 12 HOURS
Status: DISCONTINUED | OUTPATIENT
Start: 2021-03-26 | End: 2021-03-27

## 2021-03-26 RX ORDER — METOPROLOL TARTRATE 25 MG/1
25 TABLET, FILM COATED ORAL 2 TIMES DAILY
Status: DISCONTINUED | OUTPATIENT
Start: 2021-03-26 | End: 2021-03-30 | Stop reason: HOSPADM

## 2021-03-26 RX ADMIN — ALBUTEROL SULFATE 2 PUFF: 90 AEROSOL, METERED RESPIRATORY (INHALATION) at 02:03

## 2021-03-26 RX ADMIN — INSULIN ASPART 2 UNITS: 100 INJECTION, SOLUTION INTRAVENOUS; SUBCUTANEOUS at 11:03

## 2021-03-26 RX ADMIN — POTASSIUM & SODIUM PHOSPHATES POWDER PACK 280-160-250 MG 1 PACKET: 280-160-250 PACK at 04:03

## 2021-03-26 RX ADMIN — ALBUTEROL SULFATE 2 PUFF: 90 AEROSOL, METERED RESPIRATORY (INHALATION) at 07:03

## 2021-03-26 RX ADMIN — INSULIN ASPART 2 UNITS: 100 INJECTION, SOLUTION INTRAVENOUS; SUBCUTANEOUS at 05:03

## 2021-03-26 RX ADMIN — ASPIRIN 81 MG: 81 TABLET, COATED ORAL at 08:03

## 2021-03-26 RX ADMIN — ALBUTEROL SULFATE 2 PUFF: 90 AEROSOL, METERED RESPIRATORY (INHALATION) at 12:03

## 2021-03-26 RX ADMIN — Medication 1 EACH: at 08:03

## 2021-03-26 RX ADMIN — CHOLECALCIFEROL TAB 125 MCG (5000 UNIT) 5000 UNITS: 125 TAB at 08:03

## 2021-03-26 RX ADMIN — CEFTRIAXONE 1 G: 1 INJECTION, SOLUTION INTRAVENOUS at 04:03

## 2021-03-26 RX ADMIN — THERA TABS 1 TABLET: TAB at 08:03

## 2021-03-26 RX ADMIN — AZITHROMYCIN MONOHYDRATE 500 MG: 500 INJECTION, POWDER, LYOPHILIZED, FOR SOLUTION INTRAVENOUS at 04:03

## 2021-03-26 RX ADMIN — INSULIN ASPART 4 UNITS: 100 INJECTION, SOLUTION INTRAVENOUS; SUBCUTANEOUS at 05:03

## 2021-03-26 RX ADMIN — ENOXAPARIN SODIUM 40 MG: 40 INJECTION SUBCUTANEOUS at 09:03

## 2021-03-26 RX ADMIN — INSULIN ASPART 1 UNITS: 100 INJECTION, SOLUTION INTRAVENOUS; SUBCUTANEOUS at 09:03

## 2021-03-26 RX ADMIN — FAMOTIDINE 20 MG: 10 INJECTION INTRAVENOUS at 08:03

## 2021-03-26 RX ADMIN — ATORVASTATIN CALCIUM 20 MG: 20 TABLET, FILM COATED ORAL at 08:03

## 2021-03-26 RX ADMIN — POTASSIUM & SODIUM PHOSPHATES POWDER PACK 280-160-250 MG 1 PACKET: 280-160-250 PACK at 09:03

## 2021-03-26 RX ADMIN — METOPROLOL TARTRATE 25 MG: 25 TABLET, FILM COATED ORAL at 01:03

## 2021-03-26 RX ADMIN — TRAZODONE HYDROCHLORIDE 50 MG: 50 TABLET ORAL at 09:03

## 2021-03-26 RX ADMIN — METOPROLOL TARTRATE 25 MG: 25 TABLET, FILM COATED ORAL at 09:03

## 2021-03-26 RX ADMIN — OXYCODONE HYDROCHLORIDE AND ACETAMINOPHEN 500 MG: 500 TABLET ORAL at 08:03

## 2021-03-26 RX ADMIN — OXYCODONE HYDROCHLORIDE AND ACETAMINOPHEN 500 MG: 500 TABLET ORAL at 09:03

## 2021-03-26 RX ADMIN — DEXAMETHASONE 6 MG: 4 TABLET ORAL at 08:03

## 2021-03-26 RX ADMIN — ACETAMINOPHEN 650 MG: 325 TABLET ORAL at 11:03

## 2021-03-26 RX ADMIN — FAMOTIDINE 20 MG: 10 INJECTION INTRAVENOUS at 09:03

## 2021-03-26 RX ADMIN — REMDESIVIR 100 MG: 100 INJECTION, POWDER, LYOPHILIZED, FOR SOLUTION INTRAVENOUS at 03:03

## 2021-03-27 PROBLEM — U07.1 COVID-19: Status: ACTIVE | Noted: 2021-03-27

## 2021-03-27 PROBLEM — R09.02 HYPOXIA: Status: RESOLVED | Noted: 2021-03-25 | Resolved: 2021-03-27

## 2021-03-27 PROBLEM — J96.01 ACUTE HYPOXEMIC RESPIRATORY FAILURE: Status: RESOLVED | Noted: 2021-03-25 | Resolved: 2021-03-27

## 2021-03-27 LAB
ALBUMIN SERPL BCP-MCNC: 2.9 G/DL (ref 3.5–5.2)
ALP SERPL-CCNC: 66 U/L (ref 55–135)
ALT SERPL W/O P-5'-P-CCNC: 15 U/L (ref 10–44)
ANION GAP SERPL CALC-SCNC: 12 MMOL/L (ref 8–16)
AST SERPL-CCNC: 13 U/L (ref 10–40)
BASOPHILS # BLD AUTO: 0.02 K/UL (ref 0–0.2)
BASOPHILS NFR BLD: 0.1 % (ref 0–1.9)
BILIRUB SERPL-MCNC: 0.3 MG/DL (ref 0.1–1)
BUN SERPL-MCNC: 20 MG/DL (ref 8–23)
CALCIUM SERPL-MCNC: 8.8 MG/DL (ref 8.7–10.5)
CHLORIDE SERPL-SCNC: 104 MMOL/L (ref 95–110)
CO2 SERPL-SCNC: 19 MMOL/L (ref 23–29)
CREAT SERPL-MCNC: 0.9 MG/DL (ref 0.5–1.4)
CRP SERPL-MCNC: 131.2 MG/L (ref 0–8.2)
D DIMER PPP IA.FEU-MCNC: 1.09 MG/L FEU
DIFFERENTIAL METHOD: ABNORMAL
EOSINOPHIL # BLD AUTO: 0 K/UL (ref 0–0.5)
EOSINOPHIL NFR BLD: 0 % (ref 0–8)
ERYTHROCYTE [DISTWIDTH] IN BLOOD BY AUTOMATED COUNT: 12.3 % (ref 11.5–14.5)
EST. GFR  (AFRICAN AMERICAN): >60 ML/MIN/1.73 M^2
EST. GFR  (NON AFRICAN AMERICAN): >60 ML/MIN/1.73 M^2
GLUCOSE SERPL-MCNC: 242 MG/DL (ref 70–110)
HCT VFR BLD AUTO: 37.9 % (ref 37–48.5)
HGB BLD-MCNC: 12.4 G/DL (ref 12–16)
IMM GRANULOCYTES # BLD AUTO: 0.07 K/UL (ref 0–0.04)
IMM GRANULOCYTES NFR BLD AUTO: 0.5 % (ref 0–0.5)
LYMPHOCYTES # BLD AUTO: 0.9 K/UL (ref 1–4.8)
LYMPHOCYTES NFR BLD: 6 % (ref 18–48)
MAGNESIUM SERPL-MCNC: 2.2 MG/DL (ref 1.6–2.6)
MCH RBC QN AUTO: 29.5 PG (ref 27–31)
MCHC RBC AUTO-ENTMCNC: 32.7 G/DL (ref 32–36)
MCV RBC AUTO: 90 FL (ref 82–98)
MONOCYTES # BLD AUTO: 0.8 K/UL (ref 0.3–1)
MONOCYTES NFR BLD: 5.2 % (ref 4–15)
NEUTROPHILS # BLD AUTO: 13.6 K/UL (ref 1.8–7.7)
NEUTROPHILS NFR BLD: 88.2 % (ref 38–73)
NRBC BLD-RTO: 0 /100 WBC
PHOSPHATE SERPL-MCNC: 3.3 MG/DL (ref 2.7–4.5)
PLATELET # BLD AUTO: 220 K/UL (ref 150–350)
PLATELET BLD QL SMEAR: ABNORMAL
PMV BLD AUTO: 11.9 FL (ref 9.2–12.9)
POCT GLUCOSE: 182 MG/DL (ref 70–110)
POCT GLUCOSE: 252 MG/DL (ref 70–110)
POCT GLUCOSE: 298 MG/DL (ref 70–110)
POCT GLUCOSE: 346 MG/DL (ref 70–110)
POTASSIUM SERPL-SCNC: 4.6 MMOL/L (ref 3.5–5.1)
PROT SERPL-MCNC: 7.2 G/DL (ref 6–8.4)
RBC # BLD AUTO: 4.21 M/UL (ref 4–5.4)
SODIUM SERPL-SCNC: 135 MMOL/L (ref 136–145)
WBC # BLD AUTO: 15.46 K/UL (ref 3.9–12.7)

## 2021-03-27 PROCEDURE — 94761 N-INVAS EAR/PLS OXIMETRY MLT: CPT

## 2021-03-27 PROCEDURE — 63600175 PHARM REV CODE 636 W HCPCS: Performed by: NURSE PRACTITIONER

## 2021-03-27 PROCEDURE — 20000000 HC ICU ROOM

## 2021-03-27 PROCEDURE — 85379 FIBRIN DEGRADATION QUANT: CPT | Performed by: SPECIALIST

## 2021-03-27 PROCEDURE — 99233 PR SUBSEQUENT HOSPITAL CARE,LEVL III: ICD-10-PCS | Mod: ,,, | Performed by: INTERNAL MEDICINE

## 2021-03-27 PROCEDURE — 94640 AIRWAY INHALATION TREATMENT: CPT

## 2021-03-27 PROCEDURE — 99233 SBSQ HOSP IP/OBS HIGH 50: CPT | Mod: ,,, | Performed by: INTERNAL MEDICINE

## 2021-03-27 PROCEDURE — 25000003 PHARM REV CODE 250: Performed by: INTERNAL MEDICINE

## 2021-03-27 PROCEDURE — 36415 COLL VENOUS BLD VENIPUNCTURE: CPT | Performed by: SPECIALIST

## 2021-03-27 PROCEDURE — 84100 ASSAY OF PHOSPHORUS: CPT | Performed by: NURSE PRACTITIONER

## 2021-03-27 PROCEDURE — 25000003 PHARM REV CODE 250: Performed by: HOSPITALIST

## 2021-03-27 PROCEDURE — 80053 COMPREHEN METABOLIC PANEL: CPT | Performed by: NURSE PRACTITIONER

## 2021-03-27 PROCEDURE — 85025 COMPLETE CBC W/AUTO DIFF WBC: CPT | Performed by: NURSE PRACTITIONER

## 2021-03-27 PROCEDURE — 83735 ASSAY OF MAGNESIUM: CPT | Performed by: NURSE PRACTITIONER

## 2021-03-27 PROCEDURE — 63600175 PHARM REV CODE 636 W HCPCS: Performed by: HOSPITALIST

## 2021-03-27 PROCEDURE — 25000003 PHARM REV CODE 250: Performed by: NURSE PRACTITIONER

## 2021-03-27 PROCEDURE — 25000003 PHARM REV CODE 250: Performed by: SPECIALIST

## 2021-03-27 PROCEDURE — 97161 PT EVAL LOW COMPLEX 20 MIN: CPT

## 2021-03-27 PROCEDURE — 63600175 PHARM REV CODE 636 W HCPCS: Performed by: INTERNAL MEDICINE

## 2021-03-27 PROCEDURE — 27000221 HC OXYGEN, UP TO 24 HOURS

## 2021-03-27 RX ORDER — DEXAMETHASONE 4 MG/1
12 TABLET ORAL DAILY
Status: DISCONTINUED | OUTPATIENT
Start: 2021-03-27 | End: 2021-03-27

## 2021-03-27 RX ORDER — MUPIROCIN 20 MG/G
OINTMENT TOPICAL 2 TIMES DAILY
Status: DISCONTINUED | OUTPATIENT
Start: 2021-03-27 | End: 2021-03-30 | Stop reason: HOSPADM

## 2021-03-27 RX ORDER — ENOXAPARIN SODIUM 100 MG/ML
1 INJECTION SUBCUTANEOUS EVERY 12 HOURS
Status: DISCONTINUED | OUTPATIENT
Start: 2021-03-27 | End: 2021-03-30 | Stop reason: HOSPADM

## 2021-03-27 RX ORDER — ATORVASTATIN CALCIUM 40 MG/1
40 TABLET, FILM COATED ORAL DAILY
Status: DISCONTINUED | OUTPATIENT
Start: 2021-03-28 | End: 2021-03-30 | Stop reason: HOSPADM

## 2021-03-27 RX ORDER — SODIUM CHLORIDE 450 MG/100ML
INJECTION, SOLUTION INTRAVENOUS CONTINUOUS
Status: DISCONTINUED | OUTPATIENT
Start: 2021-03-27 | End: 2021-03-28

## 2021-03-27 RX ORDER — CLOPIDOGREL BISULFATE 75 MG/1
75 TABLET ORAL DAILY
Status: DISCONTINUED | OUTPATIENT
Start: 2021-03-27 | End: 2021-03-30 | Stop reason: HOSPADM

## 2021-03-27 RX ORDER — ALPRAZOLAM 0.25 MG/1
0.25 TABLET ORAL 4 TIMES DAILY PRN
Status: DISCONTINUED | OUTPATIENT
Start: 2021-03-27 | End: 2021-03-29

## 2021-03-27 RX ADMIN — ONDANSETRON 4 MG: 2 INJECTION INTRAMUSCULAR; INTRAVENOUS at 07:03

## 2021-03-27 RX ADMIN — ASPIRIN 81 MG: 81 TABLET, COATED ORAL at 08:03

## 2021-03-27 RX ADMIN — TRAZODONE HYDROCHLORIDE 50 MG: 50 TABLET ORAL at 08:03

## 2021-03-27 RX ADMIN — CHOLECALCIFEROL TAB 125 MCG (5000 UNIT) 5000 UNITS: 125 TAB at 08:03

## 2021-03-27 RX ADMIN — ALPRAZOLAM 0.25 MG: 0.25 TABLET ORAL at 08:03

## 2021-03-27 RX ADMIN — REMDESIVIR 100 MG: 100 INJECTION, POWDER, LYOPHILIZED, FOR SOLUTION INTRAVENOUS at 04:03

## 2021-03-27 RX ADMIN — ALBUTEROL SULFATE 2 PUFF: 90 AEROSOL, METERED RESPIRATORY (INHALATION) at 12:03

## 2021-03-27 RX ADMIN — METOPROLOL TARTRATE 25 MG: 25 TABLET, FILM COATED ORAL at 08:03

## 2021-03-27 RX ADMIN — OXYCODONE HYDROCHLORIDE AND ACETAMINOPHEN 500 MG: 500 TABLET ORAL at 08:03

## 2021-03-27 RX ADMIN — CLOPIDOGREL 75 MG: 75 TABLET, FILM COATED ORAL at 01:03

## 2021-03-27 RX ADMIN — INSULIN ASPART 3 UNITS: 100 INJECTION, SOLUTION INTRAVENOUS; SUBCUTANEOUS at 08:03

## 2021-03-27 RX ADMIN — ALBUTEROL SULFATE 2 PUFF: 90 AEROSOL, METERED RESPIRATORY (INHALATION) at 07:03

## 2021-03-27 RX ADMIN — DEXAMETHASONE 6 MG: 4 TABLET ORAL at 08:03

## 2021-03-27 RX ADMIN — ENOXAPARIN SODIUM 100 MG: 100 INJECTION SUBCUTANEOUS at 08:03

## 2021-03-27 RX ADMIN — MUPIROCIN: 20 OINTMENT TOPICAL at 08:03

## 2021-03-27 RX ADMIN — POTASSIUM & SODIUM PHOSPHATES POWDER PACK 280-160-250 MG 1 PACKET: 280-160-250 PACK at 06:03

## 2021-03-27 RX ADMIN — INSULIN ASPART 3 UNITS: 100 INJECTION, SOLUTION INTRAVENOUS; SUBCUTANEOUS at 04:03

## 2021-03-27 RX ADMIN — CEFTRIAXONE 1 G: 1 INJECTION, SOLUTION INTRAVENOUS at 04:03

## 2021-03-27 RX ADMIN — AZITHROMYCIN MONOHYDRATE 500 MG: 500 INJECTION, POWDER, LYOPHILIZED, FOR SOLUTION INTRAVENOUS at 04:03

## 2021-03-27 RX ADMIN — ATORVASTATIN CALCIUM 20 MG: 20 TABLET, FILM COATED ORAL at 08:03

## 2021-03-27 RX ADMIN — FAMOTIDINE 20 MG: 10 INJECTION INTRAVENOUS at 08:03

## 2021-03-27 RX ADMIN — FLUTICASONE PROPIONATE 50 MCG: 50 SPRAY, METERED NASAL at 08:03

## 2021-03-27 RX ADMIN — THERA TABS 1 TABLET: TAB at 08:03

## 2021-03-27 RX ADMIN — BENZONATATE 100 MG: 100 CAPSULE ORAL at 08:03

## 2021-03-27 RX ADMIN — POTASSIUM & SODIUM PHOSPHATES POWDER PACK 280-160-250 MG 1 PACKET: 280-160-250 PACK at 11:03

## 2021-03-27 RX ADMIN — SODIUM CHLORIDE: 0.45 INJECTION, SOLUTION INTRAVENOUS at 08:03

## 2021-03-27 RX ADMIN — ENOXAPARIN SODIUM 40 MG: 40 INJECTION SUBCUTANEOUS at 08:03

## 2021-03-27 RX ADMIN — Medication 1 EACH: at 08:03

## 2021-03-28 PROBLEM — R65.20 SEVERE SEPSIS: Status: ACTIVE | Noted: 2021-03-28

## 2021-03-28 PROBLEM — A41.9 SEVERE SEPSIS: Status: ACTIVE | Noted: 2021-03-28

## 2021-03-28 LAB
ALBUMIN SERPL BCP-MCNC: 2.6 G/DL (ref 3.5–5.2)
ALP SERPL-CCNC: 59 U/L (ref 55–135)
ALT SERPL W/O P-5'-P-CCNC: 13 U/L (ref 10–44)
ANION GAP SERPL CALC-SCNC: 10 MMOL/L (ref 8–16)
AST SERPL-CCNC: 7 U/L (ref 10–40)
BACTERIA BLD CULT: ABNORMAL
BACTERIA UR CULT: ABNORMAL
BASOPHILS # BLD AUTO: 0.01 K/UL (ref 0–0.2)
BASOPHILS NFR BLD: 0.1 % (ref 0–1.9)
BILIRUB SERPL-MCNC: 0.3 MG/DL (ref 0.1–1)
BUN SERPL-MCNC: 23 MG/DL (ref 8–23)
CALCIUM SERPL-MCNC: 8.4 MG/DL (ref 8.7–10.5)
CHLORIDE SERPL-SCNC: 104 MMOL/L (ref 95–110)
CO2 SERPL-SCNC: 21 MMOL/L (ref 23–29)
CREAT SERPL-MCNC: 0.9 MG/DL (ref 0.5–1.4)
DIFFERENTIAL METHOD: ABNORMAL
EOSINOPHIL # BLD AUTO: 0 K/UL (ref 0–0.5)
EOSINOPHIL NFR BLD: 0 % (ref 0–8)
ERYTHROCYTE [DISTWIDTH] IN BLOOD BY AUTOMATED COUNT: 12.3 % (ref 11.5–14.5)
EST. GFR  (AFRICAN AMERICAN): >60 ML/MIN/1.73 M^2
EST. GFR  (NON AFRICAN AMERICAN): >60 ML/MIN/1.73 M^2
GLUCOSE SERPL-MCNC: 266 MG/DL (ref 70–110)
HCT VFR BLD AUTO: 36.3 % (ref 37–48.5)
HGB BLD-MCNC: 11.8 G/DL (ref 12–16)
IMM GRANULOCYTES # BLD AUTO: 0.11 K/UL (ref 0–0.04)
IMM GRANULOCYTES NFR BLD AUTO: 0.8 % (ref 0–0.5)
LYMPHOCYTES # BLD AUTO: 1.1 K/UL (ref 1–4.8)
LYMPHOCYTES NFR BLD: 8.4 % (ref 18–48)
MAGNESIUM SERPL-MCNC: 2.1 MG/DL (ref 1.6–2.6)
MCH RBC QN AUTO: 29.1 PG (ref 27–31)
MCHC RBC AUTO-ENTMCNC: 32.5 G/DL (ref 32–36)
MCV RBC AUTO: 89 FL (ref 82–98)
MONOCYTES # BLD AUTO: 0.7 K/UL (ref 0.3–1)
MONOCYTES NFR BLD: 5.2 % (ref 4–15)
NEUTROPHILS # BLD AUTO: 11.1 K/UL (ref 1.8–7.7)
NEUTROPHILS NFR BLD: 85.5 % (ref 38–73)
NRBC BLD-RTO: 0 /100 WBC
PHOSPHATE SERPL-MCNC: 4 MG/DL (ref 2.7–4.5)
PLATELET # BLD AUTO: 263 K/UL (ref 150–350)
PMV BLD AUTO: 11.6 FL (ref 9.2–12.9)
POCT GLUCOSE: 236 MG/DL (ref 70–110)
POCT GLUCOSE: 245 MG/DL (ref 70–110)
POCT GLUCOSE: 265 MG/DL (ref 70–110)
POCT GLUCOSE: 303 MG/DL (ref 70–110)
POCT GLUCOSE: 305 MG/DL (ref 70–110)
POTASSIUM SERPL-SCNC: 4.3 MMOL/L (ref 3.5–5.1)
PROT SERPL-MCNC: 6.5 G/DL (ref 6–8.4)
RBC # BLD AUTO: 4.06 M/UL (ref 4–5.4)
SODIUM SERPL-SCNC: 135 MMOL/L (ref 136–145)
TROPONIN I SERPL DL<=0.01 NG/ML-MCNC: 0.59 NG/ML (ref 0–0.03)
WBC # BLD AUTO: 13.01 K/UL (ref 3.9–12.7)

## 2021-03-28 PROCEDURE — 25000003 PHARM REV CODE 250: Performed by: INTERNAL MEDICINE

## 2021-03-28 PROCEDURE — 80053 COMPREHEN METABOLIC PANEL: CPT | Performed by: NURSE PRACTITIONER

## 2021-03-28 PROCEDURE — 94761 N-INVAS EAR/PLS OXIMETRY MLT: CPT

## 2021-03-28 PROCEDURE — 94640 AIRWAY INHALATION TREATMENT: CPT

## 2021-03-28 PROCEDURE — 85025 COMPLETE CBC W/AUTO DIFF WBC: CPT | Performed by: NURSE PRACTITIONER

## 2021-03-28 PROCEDURE — 84100 ASSAY OF PHOSPHORUS: CPT | Performed by: NURSE PRACTITIONER

## 2021-03-28 PROCEDURE — C9399 UNCLASSIFIED DRUGS OR BIOLOG: HCPCS | Performed by: HOSPITALIST

## 2021-03-28 PROCEDURE — 83735 ASSAY OF MAGNESIUM: CPT | Performed by: NURSE PRACTITIONER

## 2021-03-28 PROCEDURE — 99232 SBSQ HOSP IP/OBS MODERATE 35: CPT | Mod: ,,, | Performed by: INTERNAL MEDICINE

## 2021-03-28 PROCEDURE — 99232 PR SUBSEQUENT HOSPITAL CARE,LEVL II: ICD-10-PCS | Mod: ,,, | Performed by: INTERNAL MEDICINE

## 2021-03-28 PROCEDURE — 84484 ASSAY OF TROPONIN QUANT: CPT | Performed by: HOSPITALIST

## 2021-03-28 PROCEDURE — 25000003 PHARM REV CODE 250: Performed by: SPECIALIST

## 2021-03-28 PROCEDURE — 25000003 PHARM REV CODE 250: Performed by: HOSPITALIST

## 2021-03-28 PROCEDURE — 20000000 HC ICU ROOM

## 2021-03-28 PROCEDURE — 25000003 PHARM REV CODE 250: Performed by: NURSE PRACTITIONER

## 2021-03-28 PROCEDURE — 63600175 PHARM REV CODE 636 W HCPCS: Performed by: HOSPITALIST

## 2021-03-28 PROCEDURE — 36415 COLL VENOUS BLD VENIPUNCTURE: CPT | Performed by: NURSE PRACTITIONER

## 2021-03-28 RX ORDER — GABAPENTIN 100 MG/1
300 CAPSULE ORAL 2 TIMES DAILY
COMMUNITY

## 2021-03-28 RX ORDER — CLONIDINE HYDROCHLORIDE 0.1 MG/1
0.1 TABLET ORAL EVERY 6 HOURS PRN
Status: DISCONTINUED | OUTPATIENT
Start: 2021-03-28 | End: 2021-03-28

## 2021-03-28 RX ORDER — FAMOTIDINE 20 MG/1
20 TABLET, FILM COATED ORAL 2 TIMES DAILY
Status: DISCONTINUED | OUTPATIENT
Start: 2021-03-28 | End: 2021-03-30 | Stop reason: HOSPADM

## 2021-03-28 RX ORDER — ISOSORBIDE MONONITRATE 30 MG/1
30 TABLET, EXTENDED RELEASE ORAL DAILY
Status: DISCONTINUED | OUTPATIENT
Start: 2021-03-28 | End: 2021-03-29

## 2021-03-28 RX ADMIN — FAMOTIDINE 20 MG: 10 INJECTION INTRAVENOUS at 08:03

## 2021-03-28 RX ADMIN — ALBUTEROL SULFATE 2 PUFF: 90 AEROSOL, METERED RESPIRATORY (INHALATION) at 11:03

## 2021-03-28 RX ADMIN — INSULIN ASPART 4 UNITS: 100 INJECTION, SOLUTION INTRAVENOUS; SUBCUTANEOUS at 05:03

## 2021-03-28 RX ADMIN — DEXAMETHASONE 6 MG: 4 TABLET ORAL at 08:03

## 2021-03-28 RX ADMIN — OXYCODONE HYDROCHLORIDE AND ACETAMINOPHEN 500 MG: 500 TABLET ORAL at 08:03

## 2021-03-28 RX ADMIN — TRAZODONE HYDROCHLORIDE 50 MG: 50 TABLET ORAL at 10:03

## 2021-03-28 RX ADMIN — CEFTRIAXONE 1 G: 1 INJECTION, SOLUTION INTRAVENOUS at 12:03

## 2021-03-28 RX ADMIN — INSULIN ASPART 2 UNITS: 100 INJECTION, SOLUTION INTRAVENOUS; SUBCUTANEOUS at 08:03

## 2021-03-28 RX ADMIN — ALBUTEROL SULFATE 2 PUFF: 90 AEROSOL, METERED RESPIRATORY (INHALATION) at 07:03

## 2021-03-28 RX ADMIN — MUPIROCIN: 20 OINTMENT TOPICAL at 10:03

## 2021-03-28 RX ADMIN — CLOPIDOGREL 75 MG: 75 TABLET, FILM COATED ORAL at 08:03

## 2021-03-28 RX ADMIN — INSULIN ASPART 3 UNITS: 100 INJECTION, SOLUTION INTRAVENOUS; SUBCUTANEOUS at 12:03

## 2021-03-28 RX ADMIN — CHOLECALCIFEROL TAB 125 MCG (5000 UNIT) 5000 UNITS: 125 TAB at 08:03

## 2021-03-28 RX ADMIN — ENOXAPARIN SODIUM 100 MG: 100 INJECTION SUBCUTANEOUS at 08:03

## 2021-03-28 RX ADMIN — THERA TABS 1 TABLET: TAB at 08:03

## 2021-03-28 RX ADMIN — SODIUM CHLORIDE: 0.45 INJECTION, SOLUTION INTRAVENOUS at 06:03

## 2021-03-28 RX ADMIN — ATORVASTATIN CALCIUM 40 MG: 40 TABLET, FILM COATED ORAL at 08:03

## 2021-03-28 RX ADMIN — ALBUTEROL SULFATE 2 PUFF: 90 AEROSOL, METERED RESPIRATORY (INHALATION) at 01:03

## 2021-03-28 RX ADMIN — MUPIROCIN: 20 OINTMENT TOPICAL at 08:03

## 2021-03-28 RX ADMIN — OXYCODONE HYDROCHLORIDE AND ACETAMINOPHEN 500 MG: 500 TABLET ORAL at 10:03

## 2021-03-28 RX ADMIN — INSULIN DETEMIR 10 UNITS: 100 INJECTION, SOLUTION SUBCUTANEOUS at 12:03

## 2021-03-28 RX ADMIN — FLUTICASONE PROPIONATE 50 MCG: 50 SPRAY, METERED NASAL at 08:03

## 2021-03-28 RX ADMIN — Medication 1 EACH: at 08:03

## 2021-03-28 RX ADMIN — ENOXAPARIN SODIUM 100 MG: 100 INJECTION SUBCUTANEOUS at 09:03

## 2021-03-28 RX ADMIN — CLONIDINE HYDROCHLORIDE 0.1 MG: 0.1 TABLET ORAL at 01:03

## 2021-03-28 RX ADMIN — INSULIN ASPART 2 UNITS: 100 INJECTION, SOLUTION INTRAVENOUS; SUBCUTANEOUS at 10:03

## 2021-03-28 RX ADMIN — INSULIN ASPART 2 UNITS: 100 INJECTION, SOLUTION INTRAVENOUS; SUBCUTANEOUS at 06:03

## 2021-03-28 RX ADMIN — FAMOTIDINE 20 MG: 20 TABLET ORAL at 10:03

## 2021-03-28 RX ADMIN — METOPROLOL TARTRATE 25 MG: 25 TABLET, FILM COATED ORAL at 08:03

## 2021-03-28 RX ADMIN — ISOSORBIDE MONONITRATE 30 MG: 30 TABLET, EXTENDED RELEASE ORAL at 12:03

## 2021-03-28 RX ADMIN — REMDESIVIR 100 MG: 100 INJECTION, POWDER, LYOPHILIZED, FOR SOLUTION INTRAVENOUS at 03:03

## 2021-03-28 RX ADMIN — ASPIRIN 81 MG: 81 TABLET, COATED ORAL at 08:03

## 2021-03-28 RX ADMIN — ALPRAZOLAM 0.25 MG: 0.25 TABLET ORAL at 10:03

## 2021-03-29 LAB
ALBUMIN SERPL BCP-MCNC: 2.5 G/DL (ref 3.5–5.2)
ALP SERPL-CCNC: 53 U/L (ref 55–135)
ALT SERPL W/O P-5'-P-CCNC: 14 U/L (ref 10–44)
ANION GAP SERPL CALC-SCNC: 9 MMOL/L (ref 8–16)
AST SERPL-CCNC: 9 U/L (ref 10–40)
BASOPHILS # BLD AUTO: 0.02 K/UL (ref 0–0.2)
BASOPHILS NFR BLD: 0.2 % (ref 0–1.9)
BILIRUB SERPL-MCNC: 0.2 MG/DL (ref 0.1–1)
BUN SERPL-MCNC: 25 MG/DL (ref 8–23)
CALCIUM SERPL-MCNC: 8.3 MG/DL (ref 8.7–10.5)
CHLORIDE SERPL-SCNC: 105 MMOL/L (ref 95–110)
CO2 SERPL-SCNC: 21 MMOL/L (ref 23–29)
CREAT SERPL-MCNC: 0.9 MG/DL (ref 0.5–1.4)
CRP SERPL-MCNC: 37.7 MG/L (ref 0–8.2)
DIFFERENTIAL METHOD: ABNORMAL
EOSINOPHIL # BLD AUTO: 0 K/UL (ref 0–0.5)
EOSINOPHIL NFR BLD: 0 % (ref 0–8)
ERYTHROCYTE [DISTWIDTH] IN BLOOD BY AUTOMATED COUNT: 12.3 % (ref 11.5–14.5)
EST. GFR  (AFRICAN AMERICAN): >60 ML/MIN/1.73 M^2
EST. GFR  (NON AFRICAN AMERICAN): >60 ML/MIN/1.73 M^2
GLUCOSE SERPL-MCNC: 254 MG/DL (ref 70–110)
HCT VFR BLD AUTO: 40.1 % (ref 37–48.5)
HGB BLD-MCNC: 13 G/DL (ref 12–16)
IMM GRANULOCYTES # BLD AUTO: 0.22 K/UL (ref 0–0.04)
IMM GRANULOCYTES NFR BLD AUTO: 1.8 % (ref 0–0.5)
LYMPHOCYTES # BLD AUTO: 1.6 K/UL (ref 1–4.8)
LYMPHOCYTES NFR BLD: 12.5 % (ref 18–48)
MAGNESIUM SERPL-MCNC: 2 MG/DL (ref 1.6–2.6)
MCH RBC QN AUTO: 28.8 PG (ref 27–31)
MCHC RBC AUTO-ENTMCNC: 32.4 G/DL (ref 32–36)
MCV RBC AUTO: 89 FL (ref 82–98)
MONOCYTES # BLD AUTO: 0.9 K/UL (ref 0.3–1)
MONOCYTES NFR BLD: 6.8 % (ref 4–15)
NEUTROPHILS # BLD AUTO: 9.9 K/UL (ref 1.8–7.7)
NEUTROPHILS NFR BLD: 78.7 % (ref 38–73)
NRBC BLD-RTO: 0 /100 WBC
PHOSPHATE SERPL-MCNC: 4.1 MG/DL (ref 2.7–4.5)
PLATELET # BLD AUTO: 287 K/UL (ref 150–450)
PMV BLD AUTO: 11 FL (ref 9.2–12.9)
POCT GLUCOSE: 220 MG/DL (ref 70–110)
POCT GLUCOSE: 231 MG/DL (ref 70–110)
POCT GLUCOSE: 354 MG/DL (ref 70–110)
POCT GLUCOSE: 369 MG/DL (ref 70–110)
POTASSIUM SERPL-SCNC: 4.4 MMOL/L (ref 3.5–5.1)
PROT SERPL-MCNC: 6.1 G/DL (ref 6–8.4)
RBC # BLD AUTO: 4.51 M/UL (ref 4–5.4)
SODIUM SERPL-SCNC: 135 MMOL/L (ref 136–145)
TROPONIN I SERPL DL<=0.01 NG/ML-MCNC: 0.52 NG/ML (ref 0–0.03)
WBC # BLD AUTO: 12.56 K/UL (ref 3.9–12.7)

## 2021-03-29 PROCEDURE — 94761 N-INVAS EAR/PLS OXIMETRY MLT: CPT

## 2021-03-29 PROCEDURE — 84100 ASSAY OF PHOSPHORUS: CPT | Performed by: NURSE PRACTITIONER

## 2021-03-29 PROCEDURE — 25000003 PHARM REV CODE 250: Performed by: SPECIALIST

## 2021-03-29 PROCEDURE — 25000003 PHARM REV CODE 250: Performed by: NURSE PRACTITIONER

## 2021-03-29 PROCEDURE — 25000003 PHARM REV CODE 250: Performed by: HOSPITALIST

## 2021-03-29 PROCEDURE — 86140 C-REACTIVE PROTEIN: CPT | Performed by: NURSE PRACTITIONER

## 2021-03-29 PROCEDURE — 97116 GAIT TRAINING THERAPY: CPT

## 2021-03-29 PROCEDURE — 99232 PR SUBSEQUENT HOSPITAL CARE,LEVL II: ICD-10-PCS | Mod: ,,, | Performed by: INTERNAL MEDICINE

## 2021-03-29 PROCEDURE — 83735 ASSAY OF MAGNESIUM: CPT | Performed by: NURSE PRACTITIONER

## 2021-03-29 PROCEDURE — 80053 COMPREHEN METABOLIC PANEL: CPT | Performed by: NURSE PRACTITIONER

## 2021-03-29 PROCEDURE — 63600175 PHARM REV CODE 636 W HCPCS: Performed by: HOSPITALIST

## 2021-03-29 PROCEDURE — 94640 AIRWAY INHALATION TREATMENT: CPT

## 2021-03-29 PROCEDURE — 99232 SBSQ HOSP IP/OBS MODERATE 35: CPT | Mod: ,,, | Performed by: INTERNAL MEDICINE

## 2021-03-29 PROCEDURE — 20000000 HC ICU ROOM

## 2021-03-29 PROCEDURE — 36415 COLL VENOUS BLD VENIPUNCTURE: CPT | Performed by: NURSE PRACTITIONER

## 2021-03-29 PROCEDURE — 85025 COMPLETE CBC W/AUTO DIFF WBC: CPT | Performed by: NURSE PRACTITIONER

## 2021-03-29 PROCEDURE — 84484 ASSAY OF TROPONIN QUANT: CPT | Performed by: HOSPITALIST

## 2021-03-29 PROCEDURE — 25000003 PHARM REV CODE 250: Performed by: INTERNAL MEDICINE

## 2021-03-29 RX ORDER — ALPRAZOLAM 0.25 MG/1
0.25 TABLET ORAL 4 TIMES DAILY PRN
Status: DISCONTINUED | OUTPATIENT
Start: 2021-03-29 | End: 2021-03-30 | Stop reason: HOSPADM

## 2021-03-29 RX ORDER — ISOSORBIDE MONONITRATE 30 MG/1
30 TABLET, EXTENDED RELEASE ORAL ONCE
Status: COMPLETED | OUTPATIENT
Start: 2021-03-29 | End: 2021-03-29

## 2021-03-29 RX ORDER — ALPRAZOLAM 0.25 MG/1
0.25 TABLET ORAL NIGHTLY PRN
Status: DISCONTINUED | OUTPATIENT
Start: 2021-03-29 | End: 2021-03-29

## 2021-03-29 RX ORDER — ISOSORBIDE MONONITRATE 30 MG/1
60 TABLET, EXTENDED RELEASE ORAL DAILY
Status: DISCONTINUED | OUTPATIENT
Start: 2021-03-30 | End: 2021-03-30 | Stop reason: HOSPADM

## 2021-03-29 RX ADMIN — FAMOTIDINE 20 MG: 20 TABLET ORAL at 08:03

## 2021-03-29 RX ADMIN — ASPIRIN 81 MG: 81 TABLET, COATED ORAL at 08:03

## 2021-03-29 RX ADMIN — ALBUTEROL SULFATE 2 PUFF: 90 AEROSOL, METERED RESPIRATORY (INHALATION) at 07:03

## 2021-03-29 RX ADMIN — MUPIROCIN: 20 OINTMENT TOPICAL at 10:03

## 2021-03-29 RX ADMIN — ALPRAZOLAM 0.25 MG: 0.25 TABLET ORAL at 04:03

## 2021-03-29 RX ADMIN — INSULIN ASPART 2 UNITS: 100 INJECTION, SOLUTION INTRAVENOUS; SUBCUTANEOUS at 06:03

## 2021-03-29 RX ADMIN — ALBUTEROL SULFATE 2 PUFF: 90 AEROSOL, METERED RESPIRATORY (INHALATION) at 08:03

## 2021-03-29 RX ADMIN — INSULIN ASPART 2 UNITS: 100 INJECTION, SOLUTION INTRAVENOUS; SUBCUTANEOUS at 11:03

## 2021-03-29 RX ADMIN — DEXAMETHASONE 6 MG: 4 TABLET ORAL at 08:03

## 2021-03-29 RX ADMIN — REMDESIVIR 100 MG: 100 INJECTION, POWDER, LYOPHILIZED, FOR SOLUTION INTRAVENOUS at 04:03

## 2021-03-29 RX ADMIN — INSULIN ASPART 5 UNITS: 100 INJECTION, SOLUTION INTRAVENOUS; SUBCUTANEOUS at 04:03

## 2021-03-29 RX ADMIN — CHOLECALCIFEROL TAB 125 MCG (5000 UNIT) 5000 UNITS: 125 TAB at 08:03

## 2021-03-29 RX ADMIN — FLUTICASONE PROPIONATE 50 MCG: 50 SPRAY, METERED NASAL at 08:03

## 2021-03-29 RX ADMIN — Medication 1 EACH: at 08:03

## 2021-03-29 RX ADMIN — ATORVASTATIN CALCIUM 40 MG: 40 TABLET, FILM COATED ORAL at 08:03

## 2021-03-29 RX ADMIN — METOPROLOL TARTRATE 25 MG: 25 TABLET, FILM COATED ORAL at 08:03

## 2021-03-29 RX ADMIN — ALBUTEROL SULFATE 2 PUFF: 90 AEROSOL, METERED RESPIRATORY (INHALATION) at 12:03

## 2021-03-29 RX ADMIN — INSULIN DETEMIR 10 UNITS: 100 INJECTION, SOLUTION SUBCUTANEOUS at 08:03

## 2021-03-29 RX ADMIN — CLOPIDOGREL 75 MG: 75 TABLET, FILM COATED ORAL at 08:03

## 2021-03-29 RX ADMIN — CEFTRIAXONE 1 G: 1 INJECTION, SOLUTION INTRAVENOUS at 10:03

## 2021-03-29 RX ADMIN — TRAZODONE HYDROCHLORIDE 50 MG: 50 TABLET ORAL at 08:03

## 2021-03-29 RX ADMIN — THERA TABS 1 TABLET: TAB at 08:03

## 2021-03-29 RX ADMIN — ENOXAPARIN SODIUM 100 MG: 100 INJECTION SUBCUTANEOUS at 08:03

## 2021-03-29 RX ADMIN — ISOSORBIDE MONONITRATE 30 MG: 30 TABLET, EXTENDED RELEASE ORAL at 12:03

## 2021-03-29 RX ADMIN — ISOSORBIDE MONONITRATE 30 MG: 30 TABLET, EXTENDED RELEASE ORAL at 08:03

## 2021-03-29 RX ADMIN — OXYCODONE HYDROCHLORIDE AND ACETAMINOPHEN 500 MG: 500 TABLET ORAL at 08:03

## 2021-03-29 RX ADMIN — INSULIN ASPART 3 UNITS: 100 INJECTION, SOLUTION INTRAVENOUS; SUBCUTANEOUS at 08:03

## 2021-03-29 RX ADMIN — MUPIROCIN: 20 OINTMENT TOPICAL at 08:03

## 2021-03-30 VITALS
SYSTOLIC BLOOD PRESSURE: 168 MMHG | DIASTOLIC BLOOD PRESSURE: 86 MMHG | BODY MASS INDEX: 38.84 KG/M2 | TEMPERATURE: 98 F | OXYGEN SATURATION: 96 % | HEIGHT: 64 IN | RESPIRATION RATE: 14 BRPM | WEIGHT: 227.5 LBS | HEART RATE: 65 BPM

## 2021-03-30 LAB
ALBUMIN SERPL BCP-MCNC: 2.6 G/DL (ref 3.5–5.2)
ALP SERPL-CCNC: 53 U/L (ref 55–135)
ALT SERPL W/O P-5'-P-CCNC: 17 U/L (ref 10–44)
ANION GAP SERPL CALC-SCNC: 9 MMOL/L (ref 8–16)
AST SERPL-CCNC: 10 U/L (ref 10–40)
BACTERIA BLD CULT: NORMAL
BASOPHILS # BLD AUTO: 0.05 K/UL (ref 0–0.2)
BASOPHILS NFR BLD: 0.3 % (ref 0–1.9)
BILIRUB SERPL-MCNC: 0.3 MG/DL (ref 0.1–1)
BUN SERPL-MCNC: 20 MG/DL (ref 8–23)
CALCIUM SERPL-MCNC: 8.5 MG/DL (ref 8.7–10.5)
CHLORIDE SERPL-SCNC: 105 MMOL/L (ref 95–110)
CO2 SERPL-SCNC: 22 MMOL/L (ref 23–29)
CREAT SERPL-MCNC: 0.9 MG/DL (ref 0.5–1.4)
DIFFERENTIAL METHOD: ABNORMAL
EOSINOPHIL # BLD AUTO: 0.1 K/UL (ref 0–0.5)
EOSINOPHIL NFR BLD: 0.3 % (ref 0–8)
ERYTHROCYTE [DISTWIDTH] IN BLOOD BY AUTOMATED COUNT: 12.1 % (ref 11.5–14.5)
EST. GFR  (AFRICAN AMERICAN): >60 ML/MIN/1.73 M^2
EST. GFR  (NON AFRICAN AMERICAN): >60 ML/MIN/1.73 M^2
GLUCOSE SERPL-MCNC: 195 MG/DL (ref 70–110)
HCT VFR BLD AUTO: 36.6 % (ref 37–48.5)
HGB BLD-MCNC: 12.1 G/DL (ref 12–16)
IMM GRANULOCYTES # BLD AUTO: 0.45 K/UL (ref 0–0.04)
IMM GRANULOCYTES NFR BLD AUTO: 2.8 % (ref 0–0.5)
LYMPHOCYTES # BLD AUTO: 2.2 K/UL (ref 1–4.8)
LYMPHOCYTES NFR BLD: 13.3 % (ref 18–48)
MAGNESIUM SERPL-MCNC: 1.9 MG/DL (ref 1.6–2.6)
MCH RBC QN AUTO: 29.1 PG (ref 27–31)
MCHC RBC AUTO-ENTMCNC: 33.1 G/DL (ref 32–36)
MCV RBC AUTO: 88 FL (ref 82–98)
MONOCYTES # BLD AUTO: 1.2 K/UL (ref 0.3–1)
MONOCYTES NFR BLD: 7.4 % (ref 4–15)
NEUTROPHILS # BLD AUTO: 12.4 K/UL (ref 1.8–7.7)
NEUTROPHILS NFR BLD: 75.9 % (ref 38–73)
NRBC BLD-RTO: 0 /100 WBC
PHOSPHATE SERPL-MCNC: 3.8 MG/DL (ref 2.7–4.5)
PLATELET # BLD AUTO: 314 K/UL (ref 150–450)
PMV BLD AUTO: 10.8 FL (ref 9.2–12.9)
POCT GLUCOSE: 151 MG/DL (ref 70–110)
POCT GLUCOSE: 167 MG/DL (ref 70–110)
POTASSIUM SERPL-SCNC: 3.9 MMOL/L (ref 3.5–5.1)
PROT SERPL-MCNC: 6 G/DL (ref 6–8.4)
RBC # BLD AUTO: 4.16 M/UL (ref 4–5.4)
SODIUM SERPL-SCNC: 136 MMOL/L (ref 136–145)
TROPONIN I SERPL DL<=0.01 NG/ML-MCNC: 0.47 NG/ML (ref 0–0.03)
WBC # BLD AUTO: 16.33 K/UL (ref 3.9–12.7)

## 2021-03-30 PROCEDURE — 94761 N-INVAS EAR/PLS OXIMETRY MLT: CPT

## 2021-03-30 PROCEDURE — 80053 COMPREHEN METABOLIC PANEL: CPT | Performed by: NURSE PRACTITIONER

## 2021-03-30 PROCEDURE — 25000003 PHARM REV CODE 250: Performed by: HOSPITALIST

## 2021-03-30 PROCEDURE — 83735 ASSAY OF MAGNESIUM: CPT | Performed by: NURSE PRACTITIONER

## 2021-03-30 PROCEDURE — 84100 ASSAY OF PHOSPHORUS: CPT | Performed by: NURSE PRACTITIONER

## 2021-03-30 PROCEDURE — 63600175 PHARM REV CODE 636 W HCPCS: Performed by: HOSPITALIST

## 2021-03-30 PROCEDURE — 99232 SBSQ HOSP IP/OBS MODERATE 35: CPT | Mod: ,,, | Performed by: INTERNAL MEDICINE

## 2021-03-30 PROCEDURE — 99232 PR SUBSEQUENT HOSPITAL CARE,LEVL II: ICD-10-PCS | Mod: ,,, | Performed by: INTERNAL MEDICINE

## 2021-03-30 PROCEDURE — 85025 COMPLETE CBC W/AUTO DIFF WBC: CPT | Performed by: NURSE PRACTITIONER

## 2021-03-30 PROCEDURE — 25000003 PHARM REV CODE 250: Performed by: INTERNAL MEDICINE

## 2021-03-30 PROCEDURE — 94640 AIRWAY INHALATION TREATMENT: CPT

## 2021-03-30 PROCEDURE — 84484 ASSAY OF TROPONIN QUANT: CPT | Performed by: HOSPITALIST

## 2021-03-30 PROCEDURE — 25000003 PHARM REV CODE 250: Performed by: NURSE PRACTITIONER

## 2021-03-30 PROCEDURE — 36415 COLL VENOUS BLD VENIPUNCTURE: CPT | Performed by: NURSE PRACTITIONER

## 2021-03-30 PROCEDURE — 25000003 PHARM REV CODE 250: Performed by: SPECIALIST

## 2021-03-30 RX ORDER — ALPRAZOLAM 0.25 MG/1
0.25 TABLET ORAL 3 TIMES DAILY PRN
Qty: 90 TABLET | Refills: 2 | Status: SHIPPED | OUTPATIENT
Start: 2021-03-30

## 2021-03-30 RX ORDER — ASPIRIN 81 MG/1
81 TABLET ORAL DAILY
Qty: 30 TABLET | Refills: 0 | Status: SHIPPED | OUTPATIENT
Start: 2021-03-31 | End: 2022-03-31

## 2021-03-30 RX ORDER — CLOPIDOGREL BISULFATE 75 MG/1
75 TABLET ORAL DAILY
Qty: 30 TABLET | Refills: 11 | Status: ON HOLD | OUTPATIENT
Start: 2021-03-31 | End: 2021-09-21 | Stop reason: SDUPTHER

## 2021-03-30 RX ORDER — METOPROLOL SUCCINATE 25 MG/1
25 TABLET, EXTENDED RELEASE ORAL DAILY
Qty: 30 TABLET | Refills: 11 | Status: SHIPPED | OUTPATIENT
Start: 2021-03-30 | End: 2022-03-30

## 2021-03-30 RX ORDER — CEFDINIR 300 MG/1
300 CAPSULE ORAL 2 TIMES DAILY
Qty: 14 CAPSULE | Refills: 0 | Status: SHIPPED | OUTPATIENT
Start: 2021-03-30 | End: 2021-04-06

## 2021-03-30 RX ORDER — ISOSORBIDE MONONITRATE 60 MG/1
120 TABLET, EXTENDED RELEASE ORAL DAILY
Qty: 60 TABLET | Refills: 11 | Status: SHIPPED | OUTPATIENT
Start: 2021-03-31 | End: 2022-03-31

## 2021-03-30 RX ORDER — ATORVASTATIN CALCIUM 40 MG/1
40 TABLET, FILM COATED ORAL DAILY
Qty: 90 TABLET | Refills: 3 | Status: SHIPPED | OUTPATIENT
Start: 2021-03-31 | End: 2022-03-31

## 2021-03-30 RX ADMIN — OXYCODONE HYDROCHLORIDE AND ACETAMINOPHEN 500 MG: 500 TABLET ORAL at 08:03

## 2021-03-30 RX ADMIN — Medication 1 EACH: at 08:03

## 2021-03-30 RX ADMIN — ATORVASTATIN CALCIUM 40 MG: 40 TABLET, FILM COATED ORAL at 08:03

## 2021-03-30 RX ADMIN — ASPIRIN 81 MG: 81 TABLET, COATED ORAL at 08:03

## 2021-03-30 RX ADMIN — ALBUTEROL SULFATE 2 PUFF: 90 AEROSOL, METERED RESPIRATORY (INHALATION) at 12:03

## 2021-03-30 RX ADMIN — ALBUTEROL SULFATE 2 PUFF: 90 AEROSOL, METERED RESPIRATORY (INHALATION) at 07:03

## 2021-03-30 RX ADMIN — CLOPIDOGREL 75 MG: 75 TABLET, FILM COATED ORAL at 08:03

## 2021-03-30 RX ADMIN — ENOXAPARIN SODIUM 100 MG: 100 INJECTION SUBCUTANEOUS at 08:03

## 2021-03-30 RX ADMIN — METOPROLOL TARTRATE 25 MG: 25 TABLET, FILM COATED ORAL at 08:03

## 2021-03-30 RX ADMIN — MUPIROCIN: 20 OINTMENT TOPICAL at 08:03

## 2021-03-30 RX ADMIN — INSULIN DETEMIR 10 UNITS: 100 INJECTION, SOLUTION SUBCUTANEOUS at 08:03

## 2021-03-30 RX ADMIN — CHOLECALCIFEROL TAB 125 MCG (5000 UNIT) 5000 UNITS: 125 TAB at 08:03

## 2021-03-30 RX ADMIN — CEFTRIAXONE 1 G: 1 INJECTION, SOLUTION INTRAVENOUS at 11:03

## 2021-03-30 RX ADMIN — DEXAMETHASONE 6 MG: 4 TABLET ORAL at 08:03

## 2021-03-30 RX ADMIN — ALBUTEROL SULFATE 2 PUFF: 90 AEROSOL, METERED RESPIRATORY (INHALATION) at 01:03

## 2021-03-30 RX ADMIN — THERA TABS 1 TABLET: TAB at 08:03

## 2021-03-30 RX ADMIN — ISOSORBIDE MONONITRATE 60 MG: 30 TABLET, EXTENDED RELEASE ORAL at 08:03

## 2021-03-30 RX ADMIN — FAMOTIDINE 20 MG: 20 TABLET ORAL at 08:03

## 2021-03-31 ENCOUNTER — NURSE TRIAGE (OUTPATIENT)
Dept: ADMINISTRATIVE | Facility: CLINIC | Age: 66
End: 2021-03-31

## 2021-03-31 ENCOUNTER — PATIENT MESSAGE (OUTPATIENT)
Dept: ADMINISTRATIVE | Facility: OTHER | Age: 66
End: 2021-03-31

## 2021-03-31 ENCOUNTER — TELEPHONE (OUTPATIENT)
Dept: ADMINISTRATIVE | Facility: CLINIC | Age: 66
End: 2021-03-31

## 2021-03-31 ENCOUNTER — PATIENT OUTREACH (OUTPATIENT)
Dept: ADMINISTRATIVE | Facility: CLINIC | Age: 66
End: 2021-03-31

## 2021-03-31 ENCOUNTER — PATIENT MESSAGE (OUTPATIENT)
Dept: ADMINISTRATIVE | Facility: CLINIC | Age: 66
End: 2021-03-31

## 2021-03-31 DIAGNOSIS — J12.82 PNEUMONIA DUE TO COVID-19 VIRUS: Primary | ICD-10-CM

## 2021-03-31 DIAGNOSIS — U07.1 PNEUMONIA DUE TO COVID-19 VIRUS: Primary | ICD-10-CM

## 2021-04-01 ENCOUNTER — NURSE TRIAGE (OUTPATIENT)
Dept: ADMINISTRATIVE | Facility: CLINIC | Age: 66
End: 2021-04-01

## 2021-04-01 ENCOUNTER — PATIENT MESSAGE (OUTPATIENT)
Dept: ADMINISTRATIVE | Facility: CLINIC | Age: 66
End: 2021-04-01

## 2021-04-02 ENCOUNTER — NURSE TRIAGE (OUTPATIENT)
Dept: ADMINISTRATIVE | Facility: CLINIC | Age: 66
End: 2021-04-02

## 2021-04-02 ENCOUNTER — PATIENT MESSAGE (OUTPATIENT)
Dept: ADMINISTRATIVE | Facility: OTHER | Age: 66
End: 2021-04-02

## 2021-04-03 ENCOUNTER — NURSE TRIAGE (OUTPATIENT)
Dept: ADMINISTRATIVE | Facility: CLINIC | Age: 66
End: 2021-04-03

## 2021-04-03 ENCOUNTER — PATIENT MESSAGE (OUTPATIENT)
Dept: ADMINISTRATIVE | Facility: CLINIC | Age: 66
End: 2021-04-03

## 2021-04-04 ENCOUNTER — NURSE TRIAGE (OUTPATIENT)
Dept: ADMINISTRATIVE | Facility: CLINIC | Age: 66
End: 2021-04-04

## 2021-04-04 ENCOUNTER — PATIENT MESSAGE (OUTPATIENT)
Dept: ADMINISTRATIVE | Facility: CLINIC | Age: 66
End: 2021-04-04

## 2021-04-05 ENCOUNTER — NURSE TRIAGE (OUTPATIENT)
Dept: ADMINISTRATIVE | Facility: CLINIC | Age: 66
End: 2021-04-05

## 2021-04-05 ENCOUNTER — CARE AT HOME (OUTPATIENT)
Dept: HOME HEALTH SERVICES | Facility: CLINIC | Age: 66
End: 2021-04-05
Payer: MEDICAID

## 2021-04-05 VITALS
RESPIRATION RATE: 16 BRPM | BODY MASS INDEX: 38.76 KG/M2 | HEART RATE: 70 BPM | TEMPERATURE: 98 F | OXYGEN SATURATION: 99 % | DIASTOLIC BLOOD PRESSURE: 68 MMHG | HEIGHT: 64 IN | WEIGHT: 227 LBS | SYSTOLIC BLOOD PRESSURE: 120 MMHG

## 2021-04-05 DIAGNOSIS — A41.9 SEVERE SEPSIS: ICD-10-CM

## 2021-04-05 DIAGNOSIS — Z09 HOSPITAL DISCHARGE FOLLOW-UP: ICD-10-CM

## 2021-04-05 DIAGNOSIS — B18.2 CHRONIC HEPATITIS C WITHOUT HEPATIC COMA: ICD-10-CM

## 2021-04-05 DIAGNOSIS — R65.20 SEVERE SEPSIS: ICD-10-CM

## 2021-04-05 DIAGNOSIS — E11.9 TYPE 2 DIABETES MELLITUS WITHOUT COMPLICATION, WITHOUT LONG-TERM CURRENT USE OF INSULIN: ICD-10-CM

## 2021-04-05 DIAGNOSIS — I10 BENIGN HYPERTENSION: Primary | ICD-10-CM

## 2021-04-05 DIAGNOSIS — I21.4 NSTEMI (NON-ST ELEVATED MYOCARDIAL INFARCTION): ICD-10-CM

## 2021-04-05 DIAGNOSIS — K21.9 GASTROESOPHAGEAL REFLUX DISEASE, UNSPECIFIED WHETHER ESOPHAGITIS PRESENT: ICD-10-CM

## 2021-04-05 DIAGNOSIS — U07.1 PNEUMONIA DUE TO COVID-19 VIRUS: ICD-10-CM

## 2021-04-05 DIAGNOSIS — J12.82 PNEUMONIA DUE TO COVID-19 VIRUS: ICD-10-CM

## 2021-04-05 PROCEDURE — 99496 TRANSJ CARE MGMT HIGH F2F 7D: CPT | Mod: S$GLB,,, | Performed by: NURSE PRACTITIONER

## 2021-04-05 PROCEDURE — 99496 TRANSITIONAL CARE MANAGE SERVICE 7 DAY DISCHARGE: ICD-10-PCS | Mod: S$GLB,,, | Performed by: NURSE PRACTITIONER

## 2021-04-06 ENCOUNTER — TELEPHONE (OUTPATIENT)
Dept: ADMINISTRATIVE | Facility: OTHER | Age: 66
End: 2021-04-06

## 2021-04-21 ENCOUNTER — HOSPITAL ENCOUNTER (OUTPATIENT)
Dept: RADIOLOGY | Facility: HOSPITAL | Age: 66
Discharge: HOME | End: 2021-04-21
Attending: SPECIALIST
Payer: MEDICAID

## 2021-04-21 DIAGNOSIS — R06.02 SOB (SHORTNESS OF BREATH): Primary | ICD-10-CM

## 2021-04-21 DIAGNOSIS — R06.02 SOB (SHORTNESS OF BREATH): ICD-10-CM

## 2021-04-21 PROCEDURE — 71046 X-RAY EXAM CHEST 2 VIEWS: CPT | Mod: TC,PO

## 2021-04-29 ENCOUNTER — PATIENT MESSAGE (OUTPATIENT)
Dept: RESEARCH | Facility: HOSPITAL | Age: 66
End: 2021-04-29

## 2021-07-05 PROBLEM — Z09 HOSPITAL DISCHARGE FOLLOW-UP: Status: RESOLVED | Noted: 2021-04-05 | Resolved: 2021-07-05

## 2021-07-26 ENCOUNTER — IMMUNIZATION (OUTPATIENT)
Dept: PRIMARY CARE CLINIC | Facility: CLINIC | Age: 66
End: 2021-07-26
Payer: MEDICAID

## 2021-07-26 DIAGNOSIS — Z23 NEED FOR VACCINATION: Primary | ICD-10-CM

## 2021-07-26 PROCEDURE — 91303 COVID-19,VECTOR-NR,RS-AD26,PF,0.5 ML DOSE VACCINE (JANSSEN): ICD-10-PCS | Mod: S$GLB,,, | Performed by: FAMILY MEDICINE

## 2021-07-26 PROCEDURE — 0031A COVID-19,VECTOR-NR,RS-AD26,PF,0.5 ML DOSE VACCINE (JANSSEN): ICD-10-PCS | Mod: CV19,S$GLB,, | Performed by: FAMILY MEDICINE

## 2021-07-26 PROCEDURE — 0031A COVID-19,VECTOR-NR,RS-AD26,PF,0.5 ML DOSE VACCINE (JANSSEN): CPT | Mod: CV19,S$GLB,, | Performed by: FAMILY MEDICINE

## 2021-07-26 PROCEDURE — 91303 COVID-19,VECTOR-NR,RS-AD26,PF,0.5 ML DOSE VACCINE (JANSSEN): CPT | Mod: S$GLB,,, | Performed by: FAMILY MEDICINE

## 2021-09-08 ENCOUNTER — LAB VISIT (OUTPATIENT)
Dept: URGENT CARE | Facility: CLINIC | Age: 66
End: 2021-09-08
Payer: MEDICAID

## 2021-09-08 DIAGNOSIS — Z20.822 CLOSE EXPOSURE TO COVID-19 VIRUS: ICD-10-CM

## 2021-09-08 PROCEDURE — U0003 INFECTIOUS AGENT DETECTION BY NUCLEIC ACID (DNA OR RNA); SEVERE ACUTE RESPIRATORY SYNDROME CORONAVIRUS 2 (SARS-COV-2) (CORONAVIRUS DISEASE [COVID-19]), AMPLIFIED PROBE TECHNIQUE, MAKING USE OF HIGH THROUGHPUT TECHNOLOGIES AS DESCRIBED BY CMS-2020-01-R: HCPCS | Performed by: EMERGENCY MEDICINE

## 2021-09-08 PROCEDURE — U0005 INFEC AGEN DETEC AMPLI PROBE: HCPCS | Performed by: EMERGENCY MEDICINE

## 2021-09-09 ENCOUNTER — TELEPHONE (OUTPATIENT)
Dept: URGENT CARE | Facility: CLINIC | Age: 66
End: 2021-09-09

## 2021-09-09 LAB
SARS-COV-2 RNA RESP QL NAA+PROBE: NOT DETECTED
SARS-COV-2- CYCLE NUMBER: NORMAL

## 2021-09-10 ENCOUNTER — DOCUMENT SCAN (OUTPATIENT)
Dept: HOME HEALTH SERVICES | Facility: HOSPITAL | Age: 66
End: 2021-09-10
Payer: MEDICARE

## 2021-09-14 ENCOUNTER — HOSPITAL ENCOUNTER (EMERGENCY)
Facility: HOSPITAL | Age: 66
Discharge: HOME | End: 2021-09-14
Attending: EMERGENCY MEDICINE
Payer: MEDICAID

## 2021-09-14 ENCOUNTER — DOCUMENT SCAN (OUTPATIENT)
Dept: HOME HEALTH SERVICES | Facility: HOSPITAL | Age: 66
End: 2021-09-14
Payer: MEDICARE

## 2021-09-14 VITALS
TEMPERATURE: 98 F | OXYGEN SATURATION: 96 % | RESPIRATION RATE: 19 BRPM | SYSTOLIC BLOOD PRESSURE: 152 MMHG | BODY MASS INDEX: 36.88 KG/M2 | DIASTOLIC BLOOD PRESSURE: 63 MMHG | HEART RATE: 73 BPM | HEIGHT: 64 IN | WEIGHT: 216 LBS

## 2021-09-14 DIAGNOSIS — S00.03XA CONTUSION OF SCALP, INITIAL ENCOUNTER: Primary | ICD-10-CM

## 2021-09-14 DIAGNOSIS — S70.02XA CONTUSION OF LEFT HIP, INITIAL ENCOUNTER: ICD-10-CM

## 2021-09-14 DIAGNOSIS — S13.9XXA CERVICAL SPRAIN, INITIAL ENCOUNTER: ICD-10-CM

## 2021-09-14 DIAGNOSIS — S50.12XA CONTUSION OF LEFT FOREARM, INITIAL ENCOUNTER: ICD-10-CM
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
## 2021-09-14 PROCEDURE — 99285 EMERGENCY DEPT VISIT HI MDM: CPT | Mod: 25

## 2021-09-14 PROCEDURE — 25000003 PHARM REV CODE 250: Performed by: EMERGENCY MEDICINE

## 2021-09-14 RX ORDER — OXYCODONE HYDROCHLORIDE 10 MG/1
10 TABLET ORAL
Status: COMPLETED | OUTPATIENT
Start: 2021-09-14 | End: 2021-09-14

## 2021-09-14 RX ADMIN — OXYCODONE HYDROCHLORIDE 10 MG: 10 TABLET ORAL at 04:09

## 2021-09-15 DIAGNOSIS — R94.39 ABNORMAL RESULT OF OTHER CARDIOVASCULAR FUNCTION STUDY: Primary | ICD-10-CM

## 2021-09-17 ENCOUNTER — HOSPITAL ENCOUNTER (OUTPATIENT)
Dept: PREADMISSION TESTING | Facility: HOSPITAL | Age: 66
Discharge: HOME | End: 2021-09-17
Attending: SPECIALIST
Payer: MEDICARE

## 2021-09-17 ENCOUNTER — HOSPITAL ENCOUNTER (OUTPATIENT)
Dept: RADIOLOGY | Facility: HOSPITAL | Age: 66
Discharge: HOME | End: 2021-09-17
Attending: SPECIALIST
Payer: MEDICARE

## 2021-09-17 VITALS — BODY MASS INDEX: 36.88 KG/M2 | HEIGHT: 64 IN | WEIGHT: 216 LBS

## 2021-09-17 DIAGNOSIS — Z01.810 PRE-PROCEDURAL CARDIOVASCULAR EXAMINATION: Primary | ICD-10-CM

## 2021-09-17 DIAGNOSIS — R94.39 ABNORMAL RESULT OF OTHER CARDIOVASCULAR FUNCTION STUDY: ICD-10-CM

## 2021-09-17 LAB
ALBUMIN SERPL BCP-MCNC: 4.1 G/DL (ref 3.5–5.2)
ALP SERPL-CCNC: 57 U/L (ref 55–135)
ALT SERPL W/O P-5'-P-CCNC: 16 U/L (ref 10–44)
ANION GAP SERPL CALC-SCNC: 11 MMOL/L (ref 8–16)
APTT PPP: 25.9 SEC (ref 25.6–35.8)
AST SERPL-CCNC: 18 U/L (ref 10–40)
BASOPHILS # BLD AUTO: 0.07 K/UL (ref 0–0.2)
BASOPHILS NFR BLD: 0.7 % (ref 0–1.9)
BILIRUB SERPL-MCNC: 0.9 MG/DL (ref 0.1–1)
BUN SERPL-MCNC: 12 MG/DL (ref 8–23)
CALCIUM SERPL-MCNC: 9.2 MG/DL (ref 8.7–10.5)
CHLORIDE SERPL-SCNC: 103 MMOL/L (ref 95–110)
CO2 SERPL-SCNC: 25 MMOL/L (ref 23–29)
CREAT SERPL-MCNC: 0.9 MG/DL (ref 0.5–1.4)
DIFFERENTIAL METHOD: ABNORMAL
EOSINOPHIL # BLD AUTO: 0.1 K/UL (ref 0–0.5)
EOSINOPHIL NFR BLD: 0.9 % (ref 0–8)
ERYTHROCYTE [DISTWIDTH] IN BLOOD BY AUTOMATED COUNT: 12.6 % (ref 11.5–14.5)
EST. GFR  (AFRICAN AMERICAN): >60 ML/MIN/1.73 M^2
EST. GFR  (NON AFRICAN AMERICAN): >60 ML/MIN/1.73 M^2
GLUCOSE SERPL-MCNC: 125 MG/DL (ref 70–110)
HCT VFR BLD AUTO: 43.6 % (ref 37–48.5)
HGB BLD-MCNC: 14.2 G/DL (ref 12–16)
IMM GRANULOCYTES # BLD AUTO: 0.03 K/UL (ref 0–0.04)
IMM GRANULOCYTES NFR BLD AUTO: 0.3 % (ref 0–0.5)
INR PPP: 1
LYMPHOCYTES # BLD AUTO: 1.5 K/UL (ref 1–4.8)
LYMPHOCYTES NFR BLD: 14.1 % (ref 18–48)
MAGNESIUM SERPL-MCNC: 2 MG/DL (ref 1.6–2.6)
MCH RBC QN AUTO: 28.7 PG (ref 27–31)
MCHC RBC AUTO-ENTMCNC: 32.6 G/DL (ref 32–36)
MCV RBC AUTO: 88 FL (ref 82–98)
MONOCYTES # BLD AUTO: 0.6 K/UL (ref 0.3–1)
MONOCYTES NFR BLD: 5.4 % (ref 4–15)
NEUTROPHILS # BLD AUTO: 8.1 K/UL (ref 1.8–7.7)
NEUTROPHILS NFR BLD: 78.6 % (ref 38–73)
NRBC BLD-RTO: 0 /100 WBC
PLATELET # BLD AUTO: 293 K/UL (ref 150–450)
PMV BLD AUTO: 10.8 FL (ref 9.2–12.9)
POTASSIUM SERPL-SCNC: 3.9 MMOL/L (ref 3.5–5.1)
PROT SERPL-MCNC: 7.7 G/DL (ref 6–8.4)
PROTHROMBIN TIME: 13.1 SEC (ref 11.8–14.3)
RBC # BLD AUTO: 4.94 M/UL (ref 4–5.4)
SODIUM SERPL-SCNC: 139 MMOL/L (ref 136–145)
WBC # BLD AUTO: 10.32 K/UL (ref 3.9–12.7)

## 2021-09-17 PROCEDURE — 83735 ASSAY OF MAGNESIUM: CPT | Performed by: SPECIALIST

## 2021-09-17 PROCEDURE — 36415 COLL VENOUS BLD VENIPUNCTURE: CPT | Performed by: SPECIALIST

## 2021-09-17 PROCEDURE — 80053 COMPREHEN METABOLIC PANEL: CPT | Performed by: SPECIALIST

## 2021-09-17 PROCEDURE — 85025 COMPLETE CBC W/AUTO DIFF WBC: CPT | Performed by: SPECIALIST

## 2021-09-17 PROCEDURE — 85610 PROTHROMBIN TIME: CPT | Performed by: SPECIALIST

## 2021-09-17 PROCEDURE — 71046 X-RAY EXAM CHEST 2 VIEWS: CPT | Mod: TC

## 2021-09-17 PROCEDURE — 85730 THROMBOPLASTIN TIME PARTIAL: CPT | Performed by: SPECIALIST

## 2021-09-21 ENCOUNTER — HOSPITAL ENCOUNTER (OUTPATIENT)
Facility: HOSPITAL | Age: 66
Discharge: HOME | End: 2021-09-21
Attending: SPECIALIST | Admitting: SPECIALIST
Payer: MEDICAID

## 2021-09-21 VITALS
BODY MASS INDEX: 37.56 KG/M2 | DIASTOLIC BLOOD PRESSURE: 71 MMHG | RESPIRATION RATE: 16 BRPM | WEIGHT: 220 LBS | HEIGHT: 64 IN | SYSTOLIC BLOOD PRESSURE: 160 MMHG | OXYGEN SATURATION: 99 % | HEART RATE: 62 BPM

## 2021-09-21 DIAGNOSIS — Z01.810 PRE-PROCEDURAL CARDIOVASCULAR EXAMINATION: ICD-10-CM

## 2021-09-21 DIAGNOSIS — R07.89 OTHER CHEST PAIN: ICD-10-CM

## 2021-09-21 DIAGNOSIS — I21.4 NSTEMI (NON-ST ELEVATED MYOCARDIAL INFARCTION): Primary | ICD-10-CM

## 2021-09-21 DIAGNOSIS — R94.39 ABNORMAL STRESS TEST: ICD-10-CM

## 2021-09-21 LAB
CATH EF QUANTITATIVE: 60 %
SARS-COV-2 RDRP RESP QL NAA+PROBE: NEGATIVE

## 2021-09-21 PROCEDURE — 93010 ELECTROCARDIOGRAM REPORT: CPT | Mod: ,,, | Performed by: INTERNAL MEDICINE

## 2021-09-21 PROCEDURE — C1894 INTRO/SHEATH, NON-LASER: HCPCS | Performed by: SPECIALIST

## 2021-09-21 PROCEDURE — C1874 STENT, COATED/COV W/DEL SYS: HCPCS | Performed by: SPECIALIST

## 2021-09-21 PROCEDURE — 27800903 OPTIME MED/SURG SUP & DEVICES OTHER IMPLANTS: Performed by: SPECIALIST

## 2021-09-21 PROCEDURE — C1887 CATHETER, GUIDING: HCPCS | Performed by: SPECIALIST

## 2021-09-21 PROCEDURE — 99152 MOD SED SAME PHYS/QHP 5/>YRS: CPT | Performed by: SPECIALIST

## 2021-09-21 PROCEDURE — 93458 L HRT ARTERY/VENTRICLE ANGIO: CPT | Mod: XU

## 2021-09-21 PROCEDURE — C9600 PERC DRUG-EL COR STENT SING: HCPCS | Mod: LD

## 2021-09-21 PROCEDURE — 93010 EKG 12-LEAD: ICD-10-PCS | Mod: ,,, | Performed by: INTERNAL MEDICINE

## 2021-09-21 PROCEDURE — 93005 ELECTROCARDIOGRAM TRACING: CPT | Performed by: INTERNAL MEDICINE

## 2021-09-21 PROCEDURE — 99153 MOD SED SAME PHYS/QHP EA: CPT | Performed by: SPECIALIST

## 2021-09-21 PROCEDURE — C1769 GUIDE WIRE: HCPCS | Performed by: SPECIALIST

## 2021-09-21 PROCEDURE — 63600175 PHARM REV CODE 636 W HCPCS: Performed by: SPECIALIST

## 2021-09-21 PROCEDURE — C1725 CATH, TRANSLUMIN NON-LASER: HCPCS | Performed by: SPECIALIST

## 2021-09-21 PROCEDURE — 25000242 PHARM REV CODE 250 ALT 637 W/ HCPCS: Performed by: SPECIALIST

## 2021-09-21 PROCEDURE — 25000003 PHARM REV CODE 250: Performed by: SPECIALIST

## 2021-09-21 PROCEDURE — U0002 COVID-19 LAB TEST NON-CDC: HCPCS | Performed by: SPECIALIST

## 2021-09-21 PROCEDURE — 25000003 PHARM REV CODE 250: Performed by: PHYSICIAN ASSISTANT

## 2021-09-21 DEVICE — STENT RONYX22515UX RESOLUTE ONYX 2.25X15
Type: IMPLANTABLE DEVICE | Site: CORONARY | Status: FUNCTIONAL
Brand: RESOLUTE ONYX™

## 2021-09-21 RX ORDER — CLOPIDOGREL BISULFATE 75 MG/1
TABLET ORAL
Status: DISCONTINUED | OUTPATIENT
Start: 2021-09-21 | End: 2021-09-21 | Stop reason: HOSPADM

## 2021-09-21 RX ORDER — SODIUM CHLORIDE 9 MG/ML
INJECTION, SOLUTION INTRAVENOUS
Status: DISCONTINUED | OUTPATIENT
Start: 2021-09-21 | End: 2021-09-21 | Stop reason: HOSPADM

## 2021-09-21 RX ORDER — POTASSIUM CHLORIDE 20 MEQ/1
20 TABLET, EXTENDED RELEASE ORAL
Status: DISCONTINUED | OUTPATIENT
Start: 2021-09-21 | End: 2021-09-21 | Stop reason: HOSPADM

## 2021-09-21 RX ORDER — METFORMIN HYDROCHLORIDE 500 MG/1
500 TABLET ORAL 2 TIMES DAILY WITH MEALS
Status: ON HOLD
Start: 2021-09-23 | End: 2021-10-26 | Stop reason: SDUPTHER

## 2021-09-21 RX ORDER — VERAPAMIL HYDROCHLORIDE 2.5 MG/ML
INJECTION, SOLUTION INTRAVENOUS
Status: DISCONTINUED | OUTPATIENT
Start: 2021-09-21 | End: 2021-09-21 | Stop reason: HOSPADM

## 2021-09-21 RX ORDER — ACETYLCYSTEINE 200 MG/ML
1200 SOLUTION ORAL; RESPIRATORY (INHALATION) EVERY 12 HOURS
Status: DISCONTINUED | OUTPATIENT
Start: 2021-09-21 | End: 2021-09-21 | Stop reason: HOSPADM

## 2021-09-21 RX ORDER — HEPARIN SODIUM 1000 [USP'U]/ML
INJECTION, SOLUTION INTRAVENOUS; SUBCUTANEOUS
Status: DISCONTINUED | OUTPATIENT
Start: 2021-09-21 | End: 2021-09-21 | Stop reason: HOSPADM

## 2021-09-21 RX ORDER — ACETAMINOPHEN 325 MG/1
650 TABLET ORAL EVERY 4 HOURS PRN
Status: DISCONTINUED | OUTPATIENT
Start: 2021-09-21 | End: 2021-09-21 | Stop reason: HOSPADM

## 2021-09-21 RX ORDER — NITROGLYCERIN 5 MG/ML
INJECTION, SOLUTION INTRAVENOUS
Status: DISCONTINUED | OUTPATIENT
Start: 2021-09-21 | End: 2021-09-21 | Stop reason: HOSPADM

## 2021-09-21 RX ORDER — SODIUM CHLORIDE 450 MG/100ML
INJECTION, SOLUTION INTRAVENOUS CONTINUOUS
Status: DISCONTINUED | OUTPATIENT
Start: 2021-09-21 | End: 2021-09-21 | Stop reason: HOSPADM

## 2021-09-21 RX ORDER — MIDAZOLAM HYDROCHLORIDE 1 MG/ML
INJECTION INTRAMUSCULAR; INTRAVENOUS
Status: DISCONTINUED | OUTPATIENT
Start: 2021-09-21 | End: 2021-09-21 | Stop reason: HOSPADM

## 2021-09-21 RX ORDER — FENTANYL CITRATE 50 UG/ML
INJECTION, SOLUTION INTRAMUSCULAR; INTRAVENOUS
Status: DISCONTINUED | OUTPATIENT
Start: 2021-09-21 | End: 2021-09-21 | Stop reason: HOSPADM

## 2021-09-21 RX ORDER — CLOPIDOGREL BISULFATE 75 MG/1
75 TABLET ORAL DAILY
Qty: 30 TABLET | Refills: 11 | Status: ON HOLD | OUTPATIENT
Start: 2021-09-22 | End: 2021-10-26 | Stop reason: SDUPTHER

## 2021-09-21 RX ADMIN — POTASSIUM CHLORIDE 20 MEQ: 1500 TABLET, EXTENDED RELEASE ORAL at 06:09

## 2021-09-21 RX ADMIN — SODIUM CHLORIDE: 0.45 INJECTION, SOLUTION INTRAVENOUS at 09:09

## 2021-09-21 RX ADMIN — SODIUM BICARBONATE: 84 INJECTION, SOLUTION INTRAVENOUS at 06:09

## 2021-09-21 RX ADMIN — ACETYLCYSTEINE 1200 MG: 200 SOLUTION ORAL; RESPIRATORY (INHALATION) at 06:09

## 2021-09-23 ENCOUNTER — OFFICE VISIT (OUTPATIENT)
Dept: URGENT CARE | Facility: CLINIC | Age: 66
End: 2021-09-23
Payer: MEDICAID

## 2021-09-23 ENCOUNTER — HOSPITAL ENCOUNTER (EMERGENCY)
Facility: HOSPITAL | Age: 66
Discharge: HOME | End: 2021-09-23
Attending: EMERGENCY MEDICINE
Payer: MEDICAID

## 2021-09-23 VITALS
BODY MASS INDEX: 44.39 KG/M2 | DIASTOLIC BLOOD PRESSURE: 51 MMHG | RESPIRATION RATE: 16 BRPM | SYSTOLIC BLOOD PRESSURE: 91 MMHG | OXYGEN SATURATION: 95 % | TEMPERATURE: 98 F | WEIGHT: 260 LBS | HEART RATE: 60 BPM | HEIGHT: 64 IN

## 2021-09-23 VITALS
BODY MASS INDEX: 36.88 KG/M2 | RESPIRATION RATE: 17 BRPM | HEART RATE: 63 BPM | SYSTOLIC BLOOD PRESSURE: 147 MMHG | WEIGHT: 216 LBS | HEIGHT: 64 IN | DIASTOLIC BLOOD PRESSURE: 65 MMHG | TEMPERATURE: 99 F | OXYGEN SATURATION: 99 %

## 2021-09-23 DIAGNOSIS — I95.9 TRANSIENT HYPOTENSION: Primary | ICD-10-CM

## 2021-09-23 DIAGNOSIS — M25.512 ACUTE PAIN OF LEFT SHOULDER: ICD-10-CM

## 2021-09-23 DIAGNOSIS — I95.9 HYPOTENSION: ICD-10-CM

## 2021-09-23 DIAGNOSIS — S93.409A SPRAIN OF ANKLE, UNSPECIFIED LATERALITY, UNSPECIFIED LIGAMENT, INITIAL ENCOUNTER: ICD-10-CM

## 2021-09-23 DIAGNOSIS — M79.672 LEFT FOOT PAIN: ICD-10-CM

## 2021-09-23 DIAGNOSIS — S93.601A SPRAIN OF RIGHT FOOT, INITIAL ENCOUNTER: ICD-10-CM

## 2021-09-23 DIAGNOSIS — S40.019A CONTUSION OF SHOULDER, UNSPECIFIED LATERALITY, INITIAL ENCOUNTER: ICD-10-CM

## 2021-09-23 DIAGNOSIS — M79.671 RIGHT FOOT PAIN: ICD-10-CM

## 2021-09-23 DIAGNOSIS — S93.602A SPRAIN OF LEFT FOOT, INITIAL ENCOUNTER: ICD-10-CM

## 2021-09-23 DIAGNOSIS — M79.602 LEFT ARM PAIN: ICD-10-CM
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
## 2021-09-23 DIAGNOSIS — I95.9 HYPOTENSION, UNSPECIFIED HYPOTENSION TYPE: ICD-10-CM

## 2021-09-23 DIAGNOSIS — R52 PAIN: ICD-10-CM

## 2021-09-23 DIAGNOSIS — M25.532 LEFT WRIST PAIN: ICD-10-CM

## 2021-09-23 LAB
ALBUMIN SERPL BCP-MCNC: 3.6 G/DL (ref 3.5–5.2)
ALP SERPL-CCNC: 49 U/L (ref 55–135)
ALT SERPL W/O P-5'-P-CCNC: 14 U/L (ref 10–44)
ANION GAP SERPL CALC-SCNC: 10 MMOL/L (ref 8–16)
AST SERPL-CCNC: 14 U/L (ref 10–40)
BASOPHILS # BLD AUTO: 0.06 K/UL (ref 0–0.2)
BASOPHILS NFR BLD: 0.7 % (ref 0–1.9)
BILIRUB SERPL-MCNC: 0.5 MG/DL (ref 0.1–1)
BUN SERPL-MCNC: 19 MG/DL (ref 8–23)
CALCIUM SERPL-MCNC: 9 MG/DL (ref 8.7–10.5)
CHLORIDE SERPL-SCNC: 103 MMOL/L (ref 95–110)
CO2 SERPL-SCNC: 27 MMOL/L (ref 23–29)
CREAT SERPL-MCNC: 1.1 MG/DL (ref 0.5–1.4)
DIFFERENTIAL METHOD: NORMAL
EOSINOPHIL # BLD AUTO: 0.2 K/UL (ref 0–0.5)
EOSINOPHIL NFR BLD: 1.9 % (ref 0–8)
ERYTHROCYTE [DISTWIDTH] IN BLOOD BY AUTOMATED COUNT: 12.7 % (ref 11.5–14.5)
EST. GFR  (AFRICAN AMERICAN): >60 ML/MIN/1.73 M^2
EST. GFR  (NON AFRICAN AMERICAN): 52.1 ML/MIN/1.73 M^2
GLUCOSE SERPL-MCNC: 135 MG/DL (ref 70–110)
HCT VFR BLD AUTO: 40.1 % (ref 37–48.5)
HGB BLD-MCNC: 12.9 G/DL (ref 12–16)
IMM GRANULOCYTES # BLD AUTO: 0.02 K/UL (ref 0–0.04)
IMM GRANULOCYTES NFR BLD AUTO: 0.2 % (ref 0–0.5)
LYMPHOCYTES # BLD AUTO: 1.8 K/UL (ref 1–4.8)
LYMPHOCYTES NFR BLD: 21.7 % (ref 18–48)
MCH RBC QN AUTO: 29.1 PG (ref 27–31)
MCHC RBC AUTO-ENTMCNC: 32.2 G/DL (ref 32–36)
MCV RBC AUTO: 91 FL (ref 82–98)
MONOCYTES # BLD AUTO: 0.6 K/UL (ref 0.3–1)
MONOCYTES NFR BLD: 7.3 % (ref 4–15)
NEUTROPHILS # BLD AUTO: 5.6 K/UL (ref 1.8–7.7)
NEUTROPHILS NFR BLD: 68.2 % (ref 38–73)
NRBC BLD-RTO: 0 /100 WBC
PLATELET # BLD AUTO: 269 K/UL (ref 150–450)
PMV BLD AUTO: 10.8 FL (ref 9.2–12.9)
POTASSIUM SERPL-SCNC: 4.2 MMOL/L (ref 3.5–5.1)
PROT SERPL-MCNC: 6.9 G/DL (ref 6–8.4)
RBC # BLD AUTO: 4.43 M/UL (ref 4–5.4)
SODIUM SERPL-SCNC: 140 MMOL/L (ref 136–145)
WBC # BLD AUTO: 8.23 K/UL (ref 3.9–12.7)

## 2021-09-23 PROCEDURE — 85025 COMPLETE CBC W/AUTO DIFF WBC: CPT | Performed by: EMERGENCY MEDICINE

## 2021-09-23 PROCEDURE — 80053 COMPREHEN METABOLIC PANEL: CPT | Performed by: EMERGENCY MEDICINE

## 2021-09-23 PROCEDURE — 93010 ELECTROCARDIOGRAM REPORT: CPT | Mod: ,,, | Performed by: INTERNAL MEDICINE

## 2021-09-23 PROCEDURE — 99213 PR OFFICE/OUTPT VISIT, EST, LEVL III, 20-29 MIN: ICD-10-PCS | Mod: S$GLB,,, | Performed by: NURSE PRACTITIONER

## 2021-09-23 PROCEDURE — 93005 ELECTROCARDIOGRAM TRACING: CPT | Performed by: INTERNAL MEDICINE

## 2021-09-23 PROCEDURE — 99285 EMERGENCY DEPT VISIT HI MDM: CPT

## 2021-09-23 PROCEDURE — 93010 EKG 12-LEAD: ICD-10-PCS | Mod: ,,, | Performed by: INTERNAL MEDICINE

## 2021-09-23 PROCEDURE — 99213 OFFICE O/P EST LOW 20 MIN: CPT | Mod: S$GLB,,, | Performed by: NURSE PRACTITIONER

## 2021-09-23 RX ORDER — LORATADINE 10 MG/1
TABLET ORAL
Status: ON HOLD | COMMUNITY
End: 2021-10-26

## 2021-09-23 RX ORDER — OMEGA-3-ACID ETHYL ESTERS 1 G/1
2 CAPSULE, LIQUID FILLED ORAL
COMMUNITY
End: 2023-02-04

## 2021-09-23 RX ORDER — ASCORBIC ACID 500 MG
500 TABLET ORAL DAILY
COMMUNITY

## 2021-09-23 RX ORDER — INSULIN GLARGINE 100 [IU]/ML
INJECTION, SOLUTION SUBCUTANEOUS
Status: ON HOLD | COMMUNITY
End: 2021-10-26

## 2021-09-23 RX ORDER — MIRTAZAPINE 15 MG/1
15 TABLET, FILM COATED ORAL NIGHTLY
Status: ON HOLD | COMMUNITY
Start: 2021-07-17 | End: 2021-10-26

## 2021-09-23 RX ORDER — AMLODIPINE BESYLATE 10 MG/1
5 TABLET ORAL DAILY
COMMUNITY
Start: 2021-06-21

## 2021-09-23 RX ORDER — PANTOPRAZOLE SODIUM 40 MG/1
40 TABLET, DELAYED RELEASE ORAL DAILY
COMMUNITY
Start: 2021-07-12

## 2021-09-23 RX ORDER — VITAMIN B COMPLEX
1 CAPSULE ORAL DAILY
COMMUNITY

## 2021-09-23 RX ORDER — VALACYCLOVIR HYDROCHLORIDE 500 MG/1
TABLET, FILM COATED ORAL
COMMUNITY
End: 2023-02-04

## 2021-09-23 RX ORDER — EPINEPHRINE 0.22MG
200 AEROSOL WITH ADAPTER (ML) INHALATION DAILY
COMMUNITY

## 2021-09-23 RX ORDER — HYDROXYZINE PAMOATE 25 MG/1
25 CAPSULE ORAL NIGHTLY
Status: ON HOLD | COMMUNITY
Start: 2021-07-12 | End: 2021-10-26

## 2021-09-23 RX ORDER — CYANOCOBALAMIN (VITAMIN B-12) 2500 MCG
1 TABLET, SUBLINGUAL SUBLINGUAL DAILY
COMMUNITY
End: 2023-02-04

## 2021-10-14 ENCOUNTER — CLINICAL SUPPORT (OUTPATIENT)
Dept: CARDIAC REHAB | Facility: HOSPITAL | Age: 66
End: 2021-10-14
Payer: MEDICAID

## 2021-10-14 DIAGNOSIS — Z95.5 S/P CORONARY ARTERY STENT PLACEMENT: ICD-10-CM

## 2021-10-14 PROCEDURE — 93797 PHYS/QHP OP CAR RHAB WO ECG: CPT

## 2021-10-25 ENCOUNTER — HOSPITAL ENCOUNTER (OUTPATIENT)
Dept: PREADMISSION TESTING | Facility: HOSPITAL | Age: 66
Discharge: HOME | End: 2021-10-25
Attending: SPECIALIST
Payer: MEDICAID

## 2021-10-25 VITALS — WEIGHT: 219 LBS | BODY MASS INDEX: 37.39 KG/M2 | HEIGHT: 64 IN

## 2021-10-25 DIAGNOSIS — Z01.810 PRE-PROCEDURAL CARDIOVASCULAR EXAMINATION: Primary | ICD-10-CM

## 2021-10-25 LAB
ALBUMIN SERPL BCP-MCNC: 3.9 G/DL (ref 3.5–5.2)
ALP SERPL-CCNC: 52 U/L (ref 55–135)
ALT SERPL W/O P-5'-P-CCNC: 16 U/L (ref 10–44)
ANION GAP SERPL CALC-SCNC: 8 MMOL/L (ref 8–16)
APTT PPP: 25.2 SEC (ref 23.3–35.1)
AST SERPL-CCNC: 17 U/L (ref 10–40)
BASOPHILS # BLD AUTO: 0.06 K/UL (ref 0–0.2)
BASOPHILS NFR BLD: 0.6 % (ref 0–1.9)
BILIRUB SERPL-MCNC: 0.8 MG/DL (ref 0.1–1)
BUN SERPL-MCNC: 14 MG/DL (ref 8–23)
CALCIUM SERPL-MCNC: 9.4 MG/DL (ref 8.7–10.5)
CHLORIDE SERPL-SCNC: 103 MMOL/L (ref 95–110)
CO2 SERPL-SCNC: 28 MMOL/L (ref 23–29)
CREAT SERPL-MCNC: 1 MG/DL (ref 0.5–1.4)
DIFFERENTIAL METHOD: NORMAL
EOSINOPHIL # BLD AUTO: 0.2 K/UL (ref 0–0.5)
EOSINOPHIL NFR BLD: 1.7 % (ref 0–8)
ERYTHROCYTE [DISTWIDTH] IN BLOOD BY AUTOMATED COUNT: 12.5 % (ref 11.5–14.5)
EST. GFR  (AFRICAN AMERICAN): >60 ML/MIN/1.73 M^2
EST. GFR  (NON AFRICAN AMERICAN): 58.4 ML/MIN/1.73 M^2
GLUCOSE SERPL-MCNC: 137 MG/DL (ref 70–110)
HCT VFR BLD AUTO: 41.2 % (ref 37–48.5)
HGB BLD-MCNC: 13.5 G/DL (ref 12–16)
IMM GRANULOCYTES # BLD AUTO: 0.03 K/UL (ref 0–0.04)
IMM GRANULOCYTES NFR BLD AUTO: 0.3 % (ref 0–0.5)
INR PPP: 1.1
LYMPHOCYTES # BLD AUTO: 1.9 K/UL (ref 1–4.8)
LYMPHOCYTES NFR BLD: 20 % (ref 18–48)
MAGNESIUM SERPL-MCNC: 1.8 MG/DL (ref 1.6–2.6)
MCH RBC QN AUTO: 29.2 PG (ref 27–31)
MCHC RBC AUTO-ENTMCNC: 32.8 G/DL (ref 32–36)
MCV RBC AUTO: 89 FL (ref 82–98)
MONOCYTES # BLD AUTO: 0.6 K/UL (ref 0.3–1)
MONOCYTES NFR BLD: 6.3 % (ref 4–15)
NEUTROPHILS # BLD AUTO: 6.6 K/UL (ref 1.8–7.7)
NEUTROPHILS NFR BLD: 71.1 % (ref 38–73)
NRBC BLD-RTO: 0 /100 WBC
PLATELET # BLD AUTO: 288 K/UL (ref 150–450)
PMV BLD AUTO: 10.2 FL (ref 9.2–12.9)
POTASSIUM SERPL-SCNC: 4.4 MMOL/L (ref 3.5–5.1)
PROT SERPL-MCNC: 7.1 G/DL (ref 6–8.4)
PROTHROMBIN TIME: 13.2 SEC (ref 11.4–13.7)
RBC # BLD AUTO: 4.63 M/UL (ref 4–5.4)
SODIUM SERPL-SCNC: 139 MMOL/L (ref 136–145)
WBC # BLD AUTO: 9.34 K/UL (ref 3.9–12.7)

## 2021-10-25 PROCEDURE — 85610 PROTHROMBIN TIME: CPT | Performed by: SPECIALIST

## 2021-10-25 PROCEDURE — 93010 ELECTROCARDIOGRAM REPORT: CPT | Mod: ,,, | Performed by: INTERNAL MEDICINE

## 2021-10-25 PROCEDURE — 85025 COMPLETE CBC W/AUTO DIFF WBC: CPT | Performed by: SPECIALIST

## 2021-10-25 PROCEDURE — 85730 THROMBOPLASTIN TIME PARTIAL: CPT | Performed by: SPECIALIST

## 2021-10-25 PROCEDURE — 36415 COLL VENOUS BLD VENIPUNCTURE: CPT | Performed by: SPECIALIST

## 2021-10-25 PROCEDURE — 93010 EKG 12-LEAD: ICD-10-PCS | Mod: ,,, | Performed by: INTERNAL MEDICINE

## 2021-10-25 PROCEDURE — 83735 ASSAY OF MAGNESIUM: CPT | Performed by: SPECIALIST

## 2021-10-25 PROCEDURE — 93005 ELECTROCARDIOGRAM TRACING: CPT | Performed by: INTERNAL MEDICINE

## 2021-10-25 PROCEDURE — 80053 COMPREHEN METABOLIC PANEL: CPT | Performed by: SPECIALIST

## 2021-10-25 RX ORDER — AMOXICILLIN 500 MG/1
500 CAPSULE ORAL 3 TIMES DAILY
COMMUNITY
Start: 2021-10-18 | End: 2023-02-04

## 2021-10-25 RX ORDER — EZETIMIBE 10 MG/1
10 TABLET ORAL DAILY
COMMUNITY
Start: 2021-10-04

## 2021-10-25 RX ORDER — RANOLAZINE 500 MG/1
500 TABLET, EXTENDED RELEASE ORAL 2 TIMES DAILY
COMMUNITY

## 2021-10-25 RX ORDER — NITROGLYCERIN 0.4 MG/1
0.4 TABLET SUBLINGUAL EVERY 5 MIN PRN
COMMUNITY

## 2021-10-26 ENCOUNTER — HOSPITAL ENCOUNTER (OUTPATIENT)
Facility: HOSPITAL | Age: 66
Discharge: HOME | End: 2021-10-26
Attending: SPECIALIST | Admitting: SPECIALIST
Payer: MEDICAID

## 2021-10-26 VITALS
RESPIRATION RATE: 11 BRPM | DIASTOLIC BLOOD PRESSURE: 64 MMHG | OXYGEN SATURATION: 96 % | SYSTOLIC BLOOD PRESSURE: 110 MMHG | HEART RATE: 63 BPM

## 2021-10-26 DIAGNOSIS — I21.4 NSTEMI (NON-ST ELEVATED MYOCARDIAL INFARCTION): Primary | ICD-10-CM

## 2021-10-26 DIAGNOSIS — R07.9 CHEST PAIN, UNSPECIFIED TYPE: ICD-10-CM

## 2021-10-26 DIAGNOSIS — I25.10 CAD (CORONARY ARTERY DISEASE): ICD-10-CM

## 2021-10-26 LAB — SARS-COV-2 RDRP RESP QL NAA+PROBE: NEGATIVE

## 2021-10-26 PROCEDURE — C1725 CATH, TRANSLUMIN NON-LASER: HCPCS | Performed by: SPECIALIST

## 2021-10-26 PROCEDURE — C1769 GUIDE WIRE: HCPCS | Performed by: SPECIALIST

## 2021-10-26 PROCEDURE — 99152 MOD SED SAME PHYS/QHP 5/>YRS: CPT | Performed by: SPECIALIST

## 2021-10-26 PROCEDURE — 93010 EKG 12-LEAD: ICD-10-PCS | Mod: ,,, | Performed by: INTERNAL MEDICINE

## 2021-10-26 PROCEDURE — 99153 MOD SED SAME PHYS/QHP EA: CPT | Performed by: SPECIALIST

## 2021-10-26 PROCEDURE — 96374 THER/PROPH/DIAG INJ IV PUSH: CPT | Mod: 59 | Performed by: SPECIALIST

## 2021-10-26 PROCEDURE — C9600 PERC DRUG-EL COR STENT SING: HCPCS | Mod: LC

## 2021-10-26 PROCEDURE — 93005 ELECTROCARDIOGRAM TRACING: CPT | Performed by: INTERNAL MEDICINE

## 2021-10-26 PROCEDURE — C1887 CATHETER, GUIDING: HCPCS | Performed by: SPECIALIST

## 2021-10-26 PROCEDURE — 92978 ENDOLUMINL IVUS OCT C 1ST: CPT

## 2021-10-26 PROCEDURE — C9113 INJ PANTOPRAZOLE SODIUM, VIA: HCPCS | Performed by: SPECIALIST

## 2021-10-26 PROCEDURE — 27800903 OPTIME MED/SURG SUP & DEVICES OTHER IMPLANTS: Performed by: SPECIALIST

## 2021-10-26 PROCEDURE — 25000003 PHARM REV CODE 250: Performed by: PHYSICIAN ASSISTANT

## 2021-10-26 PROCEDURE — 25000242 PHARM REV CODE 250 ALT 637 W/ HCPCS: Performed by: SPECIALIST

## 2021-10-26 PROCEDURE — 93010 ELECTROCARDIOGRAM REPORT: CPT | Mod: ,,, | Performed by: INTERNAL MEDICINE

## 2021-10-26 PROCEDURE — 25500020 PHARM REV CODE 255: Performed by: SPECIALIST

## 2021-10-26 PROCEDURE — C1753 CATH, INTRAVAS ULTRASOUND: HCPCS | Performed by: SPECIALIST

## 2021-10-26 PROCEDURE — 93458 L HRT ARTERY/VENTRICLE ANGIO: CPT | Mod: XU

## 2021-10-26 PROCEDURE — 25000003 PHARM REV CODE 250: Performed by: SPECIALIST

## 2021-10-26 PROCEDURE — C1874 STENT, COATED/COV W/DEL SYS: HCPCS | Performed by: SPECIALIST

## 2021-10-26 PROCEDURE — U0002 COVID-19 LAB TEST NON-CDC: HCPCS | Performed by: SPECIALIST

## 2021-10-26 PROCEDURE — 63600175 PHARM REV CODE 636 W HCPCS: Performed by: SPECIALIST

## 2021-10-26 PROCEDURE — C1894 INTRO/SHEATH, NON-LASER: HCPCS | Performed by: SPECIALIST

## 2021-10-26 DEVICE — STENT RONYX30030UX RESOLUTE ONYX 3.00X30
Type: IMPLANTABLE DEVICE | Site: CORONARY | Status: FUNCTIONAL
Brand: RESOLUTE ONYX™

## 2021-10-26 RX ORDER — NITROGLYCERIN 5 MG/ML
INJECTION, SOLUTION INTRAVENOUS
Status: DISCONTINUED | OUTPATIENT
Start: 2021-10-26 | End: 2021-10-26 | Stop reason: HOSPADM

## 2021-10-26 RX ORDER — LANOLIN ALCOHOL/MO/W.PET/CERES
800 CREAM (GRAM) TOPICAL ONCE
Status: COMPLETED | OUTPATIENT
Start: 2021-10-26 | End: 2021-10-26

## 2021-10-26 RX ORDER — CLOPIDOGREL BISULFATE 75 MG/1
TABLET ORAL
Status: DISCONTINUED | OUTPATIENT
Start: 2021-10-26 | End: 2021-10-26 | Stop reason: HOSPADM

## 2021-10-26 RX ORDER — FENTANYL CITRATE 50 UG/ML
INJECTION, SOLUTION INTRAMUSCULAR; INTRAVENOUS
Status: DISCONTINUED | OUTPATIENT
Start: 2021-10-26 | End: 2021-10-26 | Stop reason: HOSPADM

## 2021-10-26 RX ORDER — VERAPAMIL HYDROCHLORIDE 2.5 MG/ML
INJECTION, SOLUTION INTRAVENOUS
Status: DISCONTINUED | OUTPATIENT
Start: 2021-10-26 | End: 2021-10-26 | Stop reason: HOSPADM

## 2021-10-26 RX ORDER — MIDAZOLAM HYDROCHLORIDE 1 MG/ML
INJECTION INTRAMUSCULAR; INTRAVENOUS
Status: DISCONTINUED | OUTPATIENT
Start: 2021-10-26 | End: 2021-10-26 | Stop reason: HOSPADM

## 2021-10-26 RX ORDER — CLOPIDOGREL BISULFATE 75 MG/1
75 TABLET ORAL DAILY
Qty: 30 TABLET | Refills: 11
Start: 2021-10-27 | End: 2022-10-27

## 2021-10-26 RX ORDER — METFORMIN HYDROCHLORIDE 500 MG/1
500 TABLET ORAL 2 TIMES DAILY WITH MEALS
Start: 2021-10-29

## 2021-10-26 RX ORDER — HEPARIN SODIUM 10000 [USP'U]/ML
INJECTION, SOLUTION INTRAVENOUS; SUBCUTANEOUS
Status: DISCONTINUED | OUTPATIENT
Start: 2021-10-26 | End: 2021-10-26 | Stop reason: HOSPADM

## 2021-10-26 RX ORDER — LIDOCAINE HYDROCHLORIDE 10 MG/ML
INJECTION, SOLUTION EPIDURAL; INFILTRATION; INTRACAUDAL; PERINEURAL
Status: DISCONTINUED | OUTPATIENT
Start: 2021-10-26 | End: 2021-10-26 | Stop reason: HOSPADM

## 2021-10-26 RX ORDER — PANTOPRAZOLE SODIUM 40 MG/10ML
40 INJECTION, POWDER, LYOPHILIZED, FOR SOLUTION INTRAVENOUS ONCE
Status: COMPLETED | OUTPATIENT
Start: 2021-10-26 | End: 2021-10-26

## 2021-10-26 RX ORDER — MAG HYDROX/ALUMINUM HYD/SIMETH 200-200-20
30 SUSPENSION, ORAL (FINAL DOSE FORM) ORAL ONCE
Status: COMPLETED | OUTPATIENT
Start: 2021-10-26 | End: 2021-10-26

## 2021-10-26 RX ORDER — ACETYLCYSTEINE 200 MG/ML
1200 SOLUTION ORAL; RESPIRATORY (INHALATION) ONCE
Status: COMPLETED | OUTPATIENT
Start: 2021-10-26 | End: 2021-10-26

## 2021-10-26 RX ORDER — SODIUM CHLORIDE 450 MG/100ML
INJECTION, SOLUTION INTRAVENOUS CONTINUOUS
Status: DISCONTINUED | OUTPATIENT
Start: 2021-10-26 | End: 2021-10-26 | Stop reason: HOSPADM

## 2021-10-26 RX ADMIN — PANTOPRAZOLE SODIUM 40 MG: 40 INJECTION, POWDER, LYOPHILIZED, FOR SOLUTION INTRAVENOUS at 11:10

## 2021-10-26 RX ADMIN — ALUMINUM HYDROXIDE, MAGNESIUM HYDROXIDE, AND SIMETHICONE 30 ML: 200; 200; 20 SUSPENSION ORAL at 11:10

## 2021-10-26 RX ADMIN — SODIUM CHLORIDE: 0.45 INJECTION, SOLUTION INTRAVENOUS at 10:10

## 2021-10-26 RX ADMIN — ACETYLCYSTEINE 1200 MG: 200 SOLUTION ORAL; RESPIRATORY (INHALATION) at 07:10

## 2021-10-26 RX ADMIN — MAGNESIUM OXIDE 800 MG: 400 TABLET ORAL at 07:10

## 2021-11-01 ENCOUNTER — CLINICAL SUPPORT (OUTPATIENT)
Dept: CARDIAC REHAB | Facility: HOSPITAL | Age: 66
End: 2021-11-01
Payer: MEDICARE

## 2021-11-01 DIAGNOSIS — Z95.5 S/P CORONARY ARTERY STENT PLACEMENT: ICD-10-CM

## 2021-11-01 PROCEDURE — 93798 PHYS/QHP OP CAR RHAB W/ECG: CPT

## 2021-11-03 ENCOUNTER — CLINICAL SUPPORT (OUTPATIENT)
Dept: CARDIAC REHAB | Facility: HOSPITAL | Age: 66
End: 2021-11-03
Payer: MEDICARE

## 2021-11-03 DIAGNOSIS — Z95.5 S/P CORONARY ARTERY STENT PLACEMENT: ICD-10-CM

## 2021-11-03 PROCEDURE — 93798 PHYS/QHP OP CAR RHAB W/ECG: CPT

## 2021-11-05 ENCOUNTER — CLINICAL SUPPORT (OUTPATIENT)
Dept: CARDIAC REHAB | Facility: HOSPITAL | Age: 66
End: 2021-11-05
Payer: MEDICAID

## 2021-11-05 DIAGNOSIS — Z95.5 S/P CORONARY ARTERY STENT PLACEMENT: ICD-10-CM

## 2021-11-05 PROCEDURE — 93798 PHYS/QHP OP CAR RHAB W/ECG: CPT

## 2021-11-08 ENCOUNTER — CLINICAL SUPPORT (OUTPATIENT)
Dept: CARDIAC REHAB | Facility: HOSPITAL | Age: 66
End: 2021-11-08
Payer: MEDICARE

## 2021-11-08 DIAGNOSIS — Z95.5 S/P CORONARY ARTERY STENT PLACEMENT: ICD-10-CM

## 2021-11-08 PROCEDURE — 93798 PHYS/QHP OP CAR RHAB W/ECG: CPT

## 2021-11-10 ENCOUNTER — CLINICAL SUPPORT (OUTPATIENT)
Dept: CARDIAC REHAB | Facility: HOSPITAL | Age: 66
End: 2021-11-10
Payer: MEDICARE

## 2021-11-10 DIAGNOSIS — Z95.5 S/P CORONARY ARTERY STENT PLACEMENT: ICD-10-CM

## 2021-11-10 PROCEDURE — 93798 PHYS/QHP OP CAR RHAB W/ECG: CPT

## 2021-11-17 ENCOUNTER — CLINICAL SUPPORT (OUTPATIENT)
Dept: CARDIAC REHAB | Facility: HOSPITAL | Age: 66
End: 2021-11-17
Payer: MEDICARE

## 2021-11-17 DIAGNOSIS — Z95.5 S/P CORONARY ARTERY STENT PLACEMENT: ICD-10-CM

## 2021-11-17 PROCEDURE — 93798 PHYS/QHP OP CAR RHAB W/ECG: CPT

## 2021-11-19 ENCOUNTER — CLINICAL SUPPORT (OUTPATIENT)
Dept: CARDIAC REHAB | Facility: HOSPITAL | Age: 66
End: 2021-11-19
Payer: MEDICARE

## 2021-11-19 DIAGNOSIS — Z95.5 S/P CORONARY ARTERY STENT PLACEMENT: ICD-10-CM

## 2021-11-19 PROCEDURE — 93798 PHYS/QHP OP CAR RHAB W/ECG: CPT

## 2021-11-22 ENCOUNTER — CLINICAL SUPPORT (OUTPATIENT)
Dept: CARDIAC REHAB | Facility: HOSPITAL | Age: 66
End: 2021-11-22
Payer: MEDICARE

## 2021-11-22 DIAGNOSIS — Z95.5 S/P CORONARY ARTERY STENT PLACEMENT: ICD-10-CM

## 2021-11-22 PROCEDURE — 93798 PHYS/QHP OP CAR RHAB W/ECG: CPT

## 2021-11-29 ENCOUNTER — CLINICAL SUPPORT (OUTPATIENT)
Dept: CARDIAC REHAB | Facility: HOSPITAL | Age: 66
End: 2021-11-29
Payer: MEDICARE

## 2021-11-29 DIAGNOSIS — Z95.5 S/P CORONARY ARTERY STENT PLACEMENT: ICD-10-CM

## 2021-11-29 PROCEDURE — 93798 PHYS/QHP OP CAR RHAB W/ECG: CPT

## 2021-12-03 ENCOUNTER — CLINICAL SUPPORT (OUTPATIENT)
Dept: CARDIAC REHAB | Facility: HOSPITAL | Age: 66
End: 2021-12-03
Payer: MEDICAID

## 2021-12-03 DIAGNOSIS — Z95.5 S/P CORONARY ARTERY STENT PLACEMENT: ICD-10-CM

## 2021-12-03 PROCEDURE — 93798 PHYS/QHP OP CAR RHAB W/ECG: CPT

## 2021-12-06 ENCOUNTER — CLINICAL SUPPORT (OUTPATIENT)
Dept: CARDIAC REHAB | Facility: HOSPITAL | Age: 66
End: 2021-12-06
Payer: MEDICARE

## 2021-12-06 DIAGNOSIS — Z95.5 S/P CORONARY ARTERY STENT PLACEMENT: ICD-10-CM

## 2021-12-06 PROCEDURE — 93798 PHYS/QHP OP CAR RHAB W/ECG: CPT

## 2021-12-08 ENCOUNTER — CLINICAL SUPPORT (OUTPATIENT)
Dept: CARDIAC REHAB | Facility: HOSPITAL | Age: 66
End: 2021-12-08
Payer: MEDICARE

## 2021-12-08 DIAGNOSIS — Z95.5 S/P CORONARY ARTERY STENT PLACEMENT: ICD-10-CM

## 2021-12-08 PROCEDURE — 93798 PHYS/QHP OP CAR RHAB W/ECG: CPT

## 2021-12-10 ENCOUNTER — CLINICAL SUPPORT (OUTPATIENT)
Dept: CARDIAC REHAB | Facility: HOSPITAL | Age: 66
End: 2021-12-10
Payer: MEDICAID

## 2021-12-10 DIAGNOSIS — Z95.5 S/P CORONARY ARTERY STENT PLACEMENT: ICD-10-CM

## 2021-12-10 PROCEDURE — 93798 PHYS/QHP OP CAR RHAB W/ECG: CPT

## 2021-12-13 ENCOUNTER — CLINICAL SUPPORT (OUTPATIENT)
Dept: CARDIAC REHAB | Facility: HOSPITAL | Age: 66
End: 2021-12-13
Payer: MEDICAID

## 2021-12-13 DIAGNOSIS — Z95.5 S/P CORONARY ARTERY STENT PLACEMENT: ICD-10-CM

## 2021-12-13 PROCEDURE — 93798 PHYS/QHP OP CAR RHAB W/ECG: CPT

## 2021-12-15 ENCOUNTER — CLINICAL SUPPORT (OUTPATIENT)
Dept: CARDIAC REHAB | Facility: HOSPITAL | Age: 66
End: 2021-12-15
Payer: MEDICAID

## 2021-12-15 DIAGNOSIS — Z95.5 S/P CORONARY ARTERY STENT PLACEMENT: ICD-10-CM

## 2021-12-15 PROCEDURE — 93798 PHYS/QHP OP CAR RHAB W/ECG: CPT

## 2021-12-17 ENCOUNTER — CLINICAL SUPPORT (OUTPATIENT)
Dept: CARDIAC REHAB | Facility: HOSPITAL | Age: 66
End: 2021-12-17
Payer: MEDICARE

## 2021-12-17 DIAGNOSIS — Z95.5 S/P CORONARY ARTERY STENT PLACEMENT: ICD-10-CM

## 2021-12-17 PROCEDURE — 93798 PHYS/QHP OP CAR RHAB W/ECG: CPT

## 2021-12-20 ENCOUNTER — CLINICAL SUPPORT (OUTPATIENT)
Dept: CARDIAC REHAB | Facility: HOSPITAL | Age: 66
End: 2021-12-20
Payer: MEDICARE

## 2021-12-20 DIAGNOSIS — Z95.5 S/P CORONARY ARTERY STENT PLACEMENT: ICD-10-CM

## 2021-12-20 PROCEDURE — 93798 PHYS/QHP OP CAR RHAB W/ECG: CPT

## 2021-12-22 ENCOUNTER — CLINICAL SUPPORT (OUTPATIENT)
Dept: CARDIAC REHAB | Facility: HOSPITAL | Age: 66
End: 2021-12-22
Payer: MEDICARE

## 2021-12-22 DIAGNOSIS — Z95.5 S/P CORONARY ARTERY STENT PLACEMENT: ICD-10-CM

## 2021-12-22 PROCEDURE — 93798 PHYS/QHP OP CAR RHAB W/ECG: CPT

## 2021-12-27 ENCOUNTER — CLINICAL SUPPORT (OUTPATIENT)
Dept: CARDIAC REHAB | Facility: HOSPITAL | Age: 66
End: 2021-12-27
Payer: MEDICARE

## 2021-12-27 DIAGNOSIS — Z95.5 S/P CORONARY ARTERY STENT PLACEMENT: ICD-10-CM

## 2021-12-27 PROCEDURE — 93798 PHYS/QHP OP CAR RHAB W/ECG: CPT

## 2021-12-29 ENCOUNTER — CLINICAL SUPPORT (OUTPATIENT)
Dept: CARDIAC REHAB | Facility: HOSPITAL | Age: 66
End: 2021-12-29
Payer: MEDICAID

## 2021-12-29 DIAGNOSIS — Z95.5 S/P CORONARY ARTERY STENT PLACEMENT: ICD-10-CM

## 2021-12-29 PROCEDURE — 93798 PHYS/QHP OP CAR RHAB W/ECG: CPT

## 2022-01-03 ENCOUNTER — CLINICAL SUPPORT (OUTPATIENT)
Dept: CARDIAC REHAB | Facility: HOSPITAL | Age: 67
End: 2022-01-03
Payer: MEDICAID

## 2022-01-03 DIAGNOSIS — Z95.5 S/P CORONARY ARTERY STENT PLACEMENT: ICD-10-CM

## 2022-01-03 PROCEDURE — 93798 PHYS/QHP OP CAR RHAB W/ECG: CPT

## 2022-01-07 ENCOUNTER — CLINICAL SUPPORT (OUTPATIENT)
Dept: CARDIAC REHAB | Facility: HOSPITAL | Age: 67
End: 2022-01-07
Payer: MEDICAID

## 2022-01-07 DIAGNOSIS — Z95.5 S/P CORONARY ARTERY STENT PLACEMENT: ICD-10-CM

## 2022-01-07 PROCEDURE — 93798 PHYS/QHP OP CAR RHAB W/ECG: CPT

## 2022-01-12 ENCOUNTER — CLINICAL SUPPORT (OUTPATIENT)
Dept: CARDIAC REHAB | Facility: HOSPITAL | Age: 67
End: 2022-01-12
Payer: MEDICARE

## 2022-01-12 DIAGNOSIS — Z95.5 S/P CORONARY ARTERY STENT PLACEMENT: ICD-10-CM

## 2022-01-12 PROCEDURE — 93798 PHYS/QHP OP CAR RHAB W/ECG: CPT

## 2022-01-24 ENCOUNTER — CLINICAL SUPPORT (OUTPATIENT)
Dept: CARDIAC REHAB | Facility: HOSPITAL | Age: 67
End: 2022-01-24
Payer: MEDICARE

## 2022-01-24 DIAGNOSIS — Z95.5 S/P CORONARY ARTERY STENT PLACEMENT: ICD-10-CM

## 2022-01-24 PROCEDURE — 93798 PHYS/QHP OP CAR RHAB W/ECG: CPT

## 2022-01-26 ENCOUNTER — CLINICAL SUPPORT (OUTPATIENT)
Dept: CARDIAC REHAB | Facility: HOSPITAL | Age: 67
End: 2022-01-26
Payer: MEDICARE

## 2022-01-26 DIAGNOSIS — Z95.5 S/P CORONARY ARTERY STENT PLACEMENT: ICD-10-CM

## 2022-01-26 PROCEDURE — 93798 PHYS/QHP OP CAR RHAB W/ECG: CPT

## 2022-01-28 ENCOUNTER — CLINICAL SUPPORT (OUTPATIENT)
Dept: CARDIAC REHAB | Facility: HOSPITAL | Age: 67
End: 2022-01-28
Payer: MEDICAID

## 2022-01-28 DIAGNOSIS — Z95.5 S/P CORONARY ARTERY STENT PLACEMENT: ICD-10-CM

## 2022-01-28 PROCEDURE — 93798 PHYS/QHP OP CAR RHAB W/ECG: CPT

## 2022-01-31 ENCOUNTER — CLINICAL SUPPORT (OUTPATIENT)
Dept: CARDIAC REHAB | Facility: HOSPITAL | Age: 67
End: 2022-01-31
Payer: MEDICARE

## 2022-01-31 DIAGNOSIS — Z95.5 S/P CORONARY ARTERY STENT PLACEMENT: ICD-10-CM

## 2022-01-31 PROCEDURE — 93798 PHYS/QHP OP CAR RHAB W/ECG: CPT

## 2022-02-04 ENCOUNTER — CLINICAL SUPPORT (OUTPATIENT)
Dept: CARDIAC REHAB | Facility: HOSPITAL | Age: 67
End: 2022-02-04
Payer: MEDICAID

## 2022-02-04 DIAGNOSIS — Z95.5 S/P CORONARY ARTERY STENT PLACEMENT: ICD-10-CM

## 2022-02-04 PROCEDURE — 93798 PHYS/QHP OP CAR RHAB W/ECG: CPT

## 2022-02-07 ENCOUNTER — CLINICAL SUPPORT (OUTPATIENT)
Dept: CARDIAC REHAB | Facility: HOSPITAL | Age: 67
End: 2022-02-07
Payer: MEDICARE

## 2022-02-07 DIAGNOSIS — Z95.5 S/P CORONARY ARTERY STENT PLACEMENT: ICD-10-CM

## 2022-02-07 PROCEDURE — 93798 PHYS/QHP OP CAR RHAB W/ECG: CPT

## 2022-02-09 ENCOUNTER — CLINICAL SUPPORT (OUTPATIENT)
Dept: CARDIAC REHAB | Facility: HOSPITAL | Age: 67
End: 2022-02-09
Payer: MEDICAID

## 2022-02-09 DIAGNOSIS — Z95.5 S/P CORONARY ARTERY STENT PLACEMENT: ICD-10-CM

## 2022-02-09 PROCEDURE — 93798 PHYS/QHP OP CAR RHAB W/ECG: CPT

## 2022-02-16 ENCOUNTER — CLINICAL SUPPORT (OUTPATIENT)
Dept: CARDIAC REHAB | Facility: HOSPITAL | Age: 67
End: 2022-02-16
Payer: MEDICARE

## 2022-02-16 DIAGNOSIS — Z95.5 S/P CORONARY ARTERY STENT PLACEMENT: ICD-10-CM

## 2022-02-16 PROCEDURE — 93798 PHYS/QHP OP CAR RHAB W/ECG: CPT

## 2022-02-21 ENCOUNTER — CLINICAL SUPPORT (OUTPATIENT)
Dept: CARDIAC REHAB | Facility: HOSPITAL | Age: 67
End: 2022-02-21
Payer: MEDICARE

## 2022-02-21 DIAGNOSIS — Z95.5 S/P CORONARY ARTERY STENT PLACEMENT: ICD-10-CM

## 2022-02-21 PROCEDURE — 93798 PHYS/QHP OP CAR RHAB W/ECG: CPT

## 2022-06-27 DIAGNOSIS — S93.409A ANKLE SPRAIN: Primary | ICD-10-CM

## 2022-06-28 ENCOUNTER — EXTERNAL HOME HEALTH (OUTPATIENT)
Dept: HOME HEALTH SERVICES | Facility: HOSPITAL | Age: 67
End: 2022-06-28
Payer: MEDICAID

## 2022-07-25 ENCOUNTER — CLINICAL SUPPORT (OUTPATIENT)
Dept: REHABILITATION | Facility: HOSPITAL | Age: 67
End: 2022-07-25
Payer: MEDICAID

## 2022-07-25 DIAGNOSIS — G89.29 CHRONIC PAIN OF LEFT ANKLE: ICD-10-CM

## 2022-07-25 DIAGNOSIS — M25.572 CHRONIC PAIN OF LEFT ANKLE: ICD-10-CM

## 2022-07-25 DIAGNOSIS — R26.9 ABNORMAL GAIT: ICD-10-CM

## 2022-07-25 PROCEDURE — 97162 PT EVAL MOD COMPLEX 30 MIN: CPT | Mod: PN

## 2022-07-27 PROBLEM — M25.572 CHRONIC PAIN OF LEFT ANKLE: Status: ACTIVE | Noted: 2022-07-27

## 2022-07-27 PROBLEM — G89.29 CHRONIC PAIN OF LEFT ANKLE: Status: ACTIVE | Noted: 2022-07-27

## 2022-07-27 PROBLEM — R26.9 ABNORMAL GAIT: Status: ACTIVE | Noted: 2022-07-27

## 2022-07-27 NOTE — PLAN OF CARE
[facility]   Physical Therapy Initial Evaluation     Date: 7/25/2022   Name: Lela COLEMAN [facility] Number: [id number]    Therapy Diagnosis:   Encounter Diagnoses   Name Primary?    Chronic pain of left ankle     Abnormal gait      Physician: Atiya Rosas DPM    Physician Orders: PT Eval and Treat   Medical Diagnosis from Referral: S93.409A (ICD-10-CM) - Ankle sprain  Evaluation Date: 7/25/2022  Authorization Period Expiration: 12/31/22  Plan of Care Expiration: 10/25/22  Progress Note Due: 8/25/22  Visit # / Visits authorized: 1/ 1   FOTO: 60% limitation  FOTO 1st follow up:   FOTO 2nd follow up:    Precautions: Standard, Diabetes and stents, HTN, RA,      Time In: 9:00  Time Out: 10:00  Total Appointment Time (timed & untimed codes): 60 minutes      SUBJECTIVE     Date of onset: years    History of current condition - Lela reports: left ankle has been bothering her on and off for years. Originally sprained the left ankle in the 20's and re-injured it multiple times recently. Pain on left side of foot on latera, medial, and underneath. Feels it affects her the most with ambulation, ADL's at home, and with stairs/steps. Ankle gets swollen at times and affects her during the day, does not notice anything that increases the edema.     Falls: multiple, last one a couple weeks ago    Imaging, x-ray 9/23/21 - IMPRESSION:     No acute osseous abnormality.     Soft tissue swelling about the right ankle.    Prior Therapy: years ago for left ankle  Occupation: retired, taking care of the grandkids, 1 handicapped   Prior Level of Function: independent  Current Level of Function: increased falls, painful with ADL's, fearful of ambulation    Pain:  Current 1/10, worst 8/10, best 0/10   Location: left ankles .   Description: Aching, Dull and Sharp  Aggravating Factors: Standing and Walking  Easing Factors: pain medication and rest    Patients goals: to improve ambulation, decrease  pain, and improve overall activity tolerance.      Medical History:   Past Medical History:   Diagnosis Date    Cardiac arrhythmia     Chronic pain     Depression     Diabetes     Diabetes     GERD (gastroesophageal reflux disease)     Hepatitis     Hepatitis C    Hypertension     Hypertension     Insomnia        Surgical History:   Lela Mckinney  has a past surgical history that includes Hysterectomy; Arthroscopy of both knees; Tubal ligation; shoulder repair; Thyroidectomy; Thyroidectomy, partial (1978); Tubal ligation (1981); Shoulder surgery (1981); Colonoscopy (N/A, 5/10/2018); Robot-assisted repair of umbilical hernia using da Reginald Xi (N/A, 5/1/2019); Left heart catheterization (Left, 9/21/2021); Coronary angiography (N/A, 9/21/2021); and ANGIOGRAM, CORONARY, WITH LEFT HEART CATHETERIZATION (10/26/2021).    Medications:   Lela has a current medication list which includes the following prescription(s): alprazolam, amlodipine, amoxicillin, ascorbic acid (vitamin c), aspirin, atorvastatin, azelastine, b complex vitamins, biotin, citalopram, clopidogrel, coenzyme q10, ezetimibe, fexofenadine, fluticasone propionate, gabapentin, hydrocodone-acetaminophen, inulin, isosorbide mononitrate, lactobacillus rhamnosus gg, linagliptin, lisinopril-hydrochlorothiazide, metformin, metoprolol succinate, nitroglycerin, omega-3 acid ethyl esters, pantoprazole, psyllium, pulse oximeter, ranolazine, trazodone, valacyclovir, and vitamin d, and the following Facility-Administered Medications: acetaminophen, albuterol, diphenhydramine, epinephrine, ondansetron, and sodium chloride 0.9%.    Allergies:   Review of patient's allergies indicates:   Allergen Reactions    Indocin [indomethacin sodium] Itching    Statins-hmg-coa reductase inhibitors     Dextromethorphan Anxiety          OBJECTIVE     Observation: increased medial arch on left, ambulation with increased trendelenburg on left, decreased roll off.      Active Range of Motion:   Ankle Right Left   DF (knee extended) 5 5   Plantarflexion 40 40   Inversion 15 20   Eversion 20 20*      Strength:  Ankle Right Left   Dorsiflexion 4/5 4/5   Plantarflexion 4/5 3+/5*   Inversion 4/5 4/5   Eversion 4/5 4-/5*   *indicates pain  pain at end range with PF    Special Tests:  Anterior Drawer Slight increase   Talar tilt negative   Squeeze test negative   Mcdowell negative            Myotomes Right Left Comments   L2 5/5 5/5     L3 5/5 5/5     L4 5/5 5/5     L5 5/5 5/5     S1 4/5 4/5     S2 5/5 4/5     - same   Reflexes:  -Patellar (L3-L4): 3+  -Achilles (S1): 1+    Joint Mobility: decreased TC on left, hyp    Palpation: TTP lateral ankle, lateral malleolus, along lateral tibia    Sensation: decreased light touch lateral malleolus     Functional Tests:   Double leg squat: weight shift to right   Single leg squat: pain with end range    Edema: moderate increased pitting edema on mid tibia, to lateral malleolus,     Limitation/Restriction for FOTO ankle Survey    Therapist reviewed FOTO scores for Lela Mckinney on 7/25/2022.   FOTO documents entered into [employer] - see Media section.    Limitation Score: 40%         TREATMENT     Total Treatment time (time-based codes) separate from Evaluation: 0 minutes      Lela received the treatments listed below:       None with this visit      PATIENT EDUCATION AND HOME EXERCISES     Education provided:   - continue HEP and ankle range of motion    Written Home Exercises Provided: yes. Exercises were reviewed and Lela was able to demonstrate them prior to the end of the session.  Lela demonstrated good  understanding of the education provided. See EMR under Patient Instructions for exercises provided during therapy sessions.    ASSESSMENT     Lela is a 66 y.o. female referred to outpatient Physical Therapy with a medical diagnosis of Ankle sprain. Patient presents with decreased ankle range of motion, decreased ankle  strength, abnormal gait, increased edema, and pain affecting everyday activities.     Patient prognosis is Guarded.   Patient will benefit from skilled outpatient Physical Therapy to address the deficits stated above and in the chart below, provide patient /family education, and to maximize patientt's level of independence.     Plan of care discussed with patient: Yes  Patient's spiritual, cultural and educational needs considered and patient is agreeable to the plan of care and goals as stated below:     Anticipated Barriers for therapy: none at this time    Medical Necessity is demonstrated by the following  History  Co-morbidities and personal factors that may impact the plan of care Co-morbidities:   Cardiac arrhythmia   Chronic pain   Depression   Diabetes   Hepatitis   Hepatitis C   Hypertension   Heart Disease    Personal Factors:   lifestyle     high   Examination  Body Structures and Functions, activity limitations and participation restrictions that may impact the plan of care Body Regions:   back  lower extremities    Body Systems:    gross symmetry  ROM  strength  gross coordinated movement  balance  gait  transfers  transitions    Participation Restrictions:   none    Activity limitations:   Learning and applying knowledge  no deficits    General Tasks and Commands  no deficits    Communication  no deficits    Mobility  walking    Self care  no deficits    Domestic Life  no deficits    Interactions/Relationships  no deficits    Life Areas  no deficits    Community and Social Life  no deficits         high   Clinical Presentation evolving clinical presentation with changing clinical characteristics moderate   Decision Making/ Complexity Score: moderate     GOALS: Short Term Goals:  4-6 weeks  1.Report decreased ankle pain  < / =  4/10  to increase tolerance for ambulation  2. Increase ROM by 5-10 degrees in order to walk with min to no compensation.  3. Increase strength by 1/3 MMT grade for  ankle  to  increase tolerance for ADL and work activities.  4. Pt to tolerate HEP to improve ROM and independence with ADL's    Long Term Goals: 8-12 weeks  1.Report decreased ankle pain  < / =  2/10  to increase tolerance for ambulation  2.Patient goal: improve ambulation and be able to tolerate ADL's  3.Increase strength to 4+/5 for  LE strength  to increase tolerance for ADL and work activities.  4. Pt will report at CJ level (20-40% impaired) on Modified FIM score for mobility to demonstrate increase in LE function and mobility in home and community environment.       PLAN   Plan of care Certification: 7/25/2022 to 10/25/22.    Outpatient Physical Therapy 2 times weekly for 12 weeks to include the following interventions: Gait Training, Manual Therapy, Moist Heat/ Ice, Neuromuscular Re-ed, Patient Education, Self Care, Therapeutic Activities and Therapeutic Exercise.     Ayaz Castaneda, PT      I CERTIFY THE NEED FOR THESE SERVICES FURNISHED UNDER THIS PLAN OF TREATMENT AND WHILE UNDER MY CARE   Physician's comments:     Physician's Signature: ___________________________________________________

## 2022-08-03 ENCOUNTER — CLINICAL SUPPORT (OUTPATIENT)
Dept: REHABILITATION | Facility: HOSPITAL | Age: 67
End: 2022-08-03
Payer: MEDICARE

## 2022-08-03 DIAGNOSIS — R26.9 ABNORMAL GAIT: ICD-10-CM

## 2022-08-03 DIAGNOSIS — M25.572 CHRONIC PAIN OF LEFT ANKLE: Primary | ICD-10-CM

## 2022-08-03 DIAGNOSIS — G89.29 CHRONIC PAIN OF LEFT ANKLE: Primary | ICD-10-CM

## 2022-08-03 PROCEDURE — 97110 THERAPEUTIC EXERCISES: CPT | Mod: PN

## 2022-08-03 NOTE — PROGRESS NOTES
Physical Therapy Daily Treatment Note     Name: Lela COLEMAN [facility] Number: [id number]    Therapy Diagnosis:   Encounter Diagnoses   Name Primary?    Chronic pain of left ankle Yes    Abnormal gait      Physician: Atiya Rosas DPM    Visit Date: 8/3/2022  Physician Orders: PT Eval and Treat   Medical Diagnosis from Referral: S93.409A (ICD-10-CM) - Ankle sprain  Evaluation Date: 7/25/2022  Authorization Period Expiration: 12/31/22  Plan of Care Expiration: 10/25/22  Progress Note Due: 8/25/22  Visit # / Visits authorized: 1/4  FOTO: 60% limitation  FOTO 1st follow up:   FOTO 2nd follow up:     Precautions: Standard, Diabetes and stents, HTN, RA,       Time In: 15:00  Time Out: 15:55  Total Appointment Time (timed & untimed codes): 55 minutes    Subjective     Pt reports: feeling a little better today.     She was compliant with home exercise program.  Response to previous treatment: n/a  Functional change: n/a    Pain: 4/10  Location: left ankles     Objective     Lela received therapeutic exercises to develop strength and endurance for 25 minutes including:  Ankle DF/PF with green TB 40x each  Ankle EV yellow TB 40x  Seated pronation with towel 30x  Standing pronation with towel for cueing 30x  Ankle kneeling DF 30x    Lela received the following manual therapy techniques: Joint mobilizations were applied to the: ankle for 30 minutes, including:  Ankle assessment  TC mobilization  Subtalar mobilizations    Home Exercises Provided and Patient Education Provided     Education provided:   - HEP  - ankle DF     Written Home Exercises Provided: yes.  Exercises were reviewed and Lela was able to demonstrate them prior to the end of the session.  Lela demonstrated good  understanding of the education provided.     See EMR under Patient Instructions for exercises provided prior visit.    Assessment      Lela still presented with increased edema and pain during WB on left ankle. Focused  on promoting pronation at the ankle and ankle DF. Continue to assess and progress as tolerated.    Lela Is progressing well towards her goals.   Pt prognosis is Fair.     Pt will continue to benefit from skilled outpatient physical therapy to address the deficits listed in the problem list box on initial evaluation, provide pt/family education and to maximize pt's level of independence in the home and community environment.     Pt's spiritual, cultural and educational needs considered and pt agreeable to plan of care and goals.    Anticipated barriers to physical therapy: none    GOALS: Short Term Goals:  4-6 weeks - progressing not met  1.Report decreased ankle pain  < / =  4/10  to increase tolerance for ambulation  2. Increase ROM by 5-10 degrees in order to walk with min to no compensation.  3. Increase strength by 1/3 MMT grade for  ankle  to increase tolerance for ADL and work activities.  4. Pt to tolerate HEP to improve ROM and independence with ADL's     Long Term Goals: 8-12 weeks - progressing not met  1.Report decreased ankle pain  < / =  2/10  to increase tolerance for ambulation  2.Patient goal: improve ambulation and be able to tolerate ADL's  3.Increase strength to 4+/5 for  LE strength  to increase tolerance for ADL and work activities.  4. Pt will report at CJ level (20-40% impaired) on Modified FIM score for mobility to demonstrate increase in LE function and mobility in home and community environment.     Plan   Plan of care Certification: 7/25/2022 to 10/25/22.    Continue with POC and progress as tolerated.     Ayaz Castaneda, PT

## 2022-08-09 ENCOUNTER — CLINICAL SUPPORT (OUTPATIENT)
Dept: REHABILITATION | Facility: HOSPITAL | Age: 67
End: 2022-08-09
Payer: MEDICARE

## 2022-08-09 DIAGNOSIS — G89.29 CHRONIC PAIN OF LEFT ANKLE: Primary | ICD-10-CM

## 2022-08-09 DIAGNOSIS — R26.9 ABNORMAL GAIT: ICD-10-CM

## 2022-08-09 DIAGNOSIS — M25.572 CHRONIC PAIN OF LEFT ANKLE: Primary | ICD-10-CM

## 2022-08-09 PROCEDURE — 97110 THERAPEUTIC EXERCISES: CPT | Mod: PN

## 2022-08-09 NOTE — PROGRESS NOTES
Physical Therapy Daily Treatment Note     Name: Lela COLEMAN [facility] Number: [id number]    Therapy Diagnosis:   Encounter Diagnoses   Name Primary?    Chronic pain of left ankle Yes    Abnormal gait      Physician: Atiya Rosas DPM    Visit Date: 8/9/2022  Physician Orders: PT Eval and Treat   Medical Diagnosis from Referral: S93.409A (ICD-10-CM) - Ankle sprain  Evaluation Date: 7/25/2022  Authorization Period Expiration: 12/31/22  Plan of Care Expiration: 10/25/22  Progress Note Due: 8/25/22  Visit # / Visits authorized: 2/4  FOTO: 60% limitation  FOTO 1st follow up:   FOTO 2nd follow up:     Precautions: Standard, Diabetes and stents, HTN, RA,       Time In: 9:00  Time Out: 9:58  Total Appointment Time (timed & untimed codes): 58 minutes    Subjective     Pt reports: feeling a little better today.     She was compliant with home exercise program.  Response to previous treatment: n/a  Functional change: n/a    Pain: 4/10  Location: left ankles     Objective     PT technician assisted with treatment under direct supervision of PT     **All exercises billed as therapeutic exercises per medicaid guidelines**    Lela received therapeutic exercises to develop strength and endurance for 35 minutes including:  Ankle 4 way green TB 3x10 each  Standing hip abd 2x15  S/l hip abd 2x10 each  SLR 3x10 each  Double leg shuttle 67# 2x15  Single leg shuttle 32# 2x15  Seated heel raise 5# 3x20    Not Performed  Seated pronation with towel 30x  Standing pronation with towel for cueing 30x    Lela received the following manual therapy techniques: Joint mobilizations were applied to the: ankle for 23 minutes, including:  Ankle assessment  TC mobilization  Subtalar mobilizations  Proximal tib/fib mobilizations    Home Exercises Provided and Patient Education Provided     Education provided:   - HEP  - ankle DF     Written Home Exercises Provided: yes.  Exercises were reviewed and Lela was able to demonstrate  them prior to the end of the session.  Lela demonstrated good  understanding of the education provided.     See EMR under Patient Instructions for exercises provided prior visit.    Assessment     Lela still ambulating on lateral aspect of foot with decreased toe off. Noticeable muscle atrophy on left calf mm and painful with PF during ambulation. Added ankle stability and strengthening for GS today. Continue progression as tolerated.     Lela Is progressing well towards her goals.   Pt prognosis is Fair.     Pt will continue to benefit from skilled outpatient physical therapy to address the deficits listed in the problem list box on initial evaluation, provide pt/family education and to maximize pt's level of independence in the home and community environment.     Pt's spiritual, cultural and educational needs considered and pt agreeable to plan of care and goals.    Anticipated barriers to physical therapy: none    GOALS: Short Term Goals:  4-6 weeks - progressing not met  1.Report decreased ankle pain  < / =  4/10  to increase tolerance for ambulation  2. Increase ROM by 5-10 degrees in order to walk with min to no compensation.  3. Increase strength by 1/3 MMT grade for  ankle  to increase tolerance for ADL and work activities.  4. Pt to tolerate HEP to improve ROM and independence with ADL's     Long Term Goals: 8-12 weeks - progressing not met  1.Report decreased ankle pain  < / =  2/10  to increase tolerance for ambulation  2.Patient goal: improve ambulation and be able to tolerate ADL's  3.Increase strength to 4+/5 for  LE strength  to increase tolerance for ADL and work activities.  4. Pt will report at CJ level (20-40% impaired) on Modified FIM score for mobility to demonstrate increase in LE function and mobility in home and community environment.     Plan   Plan of care Certification: 7/25/2022 to 10/25/22.    Continue with POC and progress as tolerated.     Ayaz Castaneda, PT

## 2022-08-10 ENCOUNTER — CLINICAL SUPPORT (OUTPATIENT)
Dept: REHABILITATION | Facility: HOSPITAL | Age: 67
End: 2022-08-10
Payer: MEDICARE

## 2022-08-10 DIAGNOSIS — R26.9 ABNORMAL GAIT: ICD-10-CM

## 2022-08-10 DIAGNOSIS — M25.572 CHRONIC PAIN OF LEFT ANKLE: Primary | ICD-10-CM

## 2022-08-10 DIAGNOSIS — G89.29 CHRONIC PAIN OF LEFT ANKLE: Primary | ICD-10-CM

## 2022-08-10 PROCEDURE — 97110 THERAPEUTIC EXERCISES: CPT | Mod: PN

## 2022-08-10 NOTE — PROGRESS NOTES
"    Physical Therapy Daily Treatment Note     Name: Lela COLEMAN [facility] Number: [id number]    Therapy Diagnosis:   No diagnosis found.  Physician: Atiya Rosas, DPVERONIKA    Visit Date: 8/10/2022  Physician Orders: PT Eval and Treat   Medical Diagnosis from Referral: S93.409A (ICD-10-CM) - Ankle sprain  Evaluation Date: 7/25/2022  Authorization Period Expiration: 12/31/22  Plan of Care Expiration: 10/25/22  Progress Note Due: 8/25/22  Visit # / Visits authorized: 3/4  FOTO: 60% limitation  FOTO 1st follow up:   FOTO 2nd follow up:     Precautions: Standard, Diabetes and stents, HTN, RA,       Time In: 14:00  Time Out: 15:00  Total Appointment Time (timed & untimed codes): 60 minutes    Subjective     Pt reports: feeling about the same from yesterday.     She was compliant with home exercise program.  Response to previous treatment: n/a  Functional change: improved ankle range of motion.     Pain: 4/10  Location: left ankle    Objective     **All exercises billed as therapeutic exercises per medicaid guidelines**    PT technician assisted with treatment under direct supervision of PT     Lela received therapeutic exercises to develop strength and endurance for 40 minutes including:  Ankle 4 way green TB 3x10 each  Standing hip abd/ext 2x15  S/l hip abd 2x10 each  SLR 3x10 each  Double leg shuttle 62# 3x15  Single leg shuttle 37# 2x15  Double leg heel raise 62# 3x15  Single leg 5# seated soleos heel raise 45" hold 5x    Seated pronation with towel 30x  Standing pronation with towel for cueing 30x    Lela received the following manual therapy techniques: Joint mobilizations were applied to the: ankle for 20 minutes, including:  Ankle assessment  TC mobilization  Subtalar mobilizations  Distal/proximal tib/fib mobilizations    Home Exercises Provided and Patient Education Provided     Education provided:   - HEP  - ankle DF     Written Home Exercises Provided: yes.  Exercises were reviewed and Lela was " able to demonstrate them prior to the end of the session.  Lela demonstrated good  understanding of the education provided.     See EMR under Patient Instructions for exercises provided prior visit.    Assessment     Lela showed some improvement with ambulation following manual techniques. Focused on LE strengthening and added isometrics that reduced symptoms in posterior ankle. Also progressed with LE strengthening exercises. Continue progressions as tolerated.     Lela Is progressing well towards her goals.   Pt prognosis is Fair.     Pt will continue to benefit from skilled outpatient physical therapy to address the deficits listed in the problem list box on initial evaluation, provide pt/family education and to maximize pt's level of independence in the home and community environment.     Pt's spiritual, cultural and educational needs considered and pt agreeable to plan of care and goals.    Anticipated barriers to physical therapy: none    GOALS: Short Term Goals:  4-6 weeks - progressing not met  1.Report decreased ankle pain  < / =  4/10  to increase tolerance for ambulation  2. Increase ROM by 5-10 degrees in order to walk with min to no compensation.  3. Increase strength by 1/3 MMT grade for  ankle  to increase tolerance for ADL and work activities.  4. Pt to tolerate HEP to improve ROM and independence with ADL's     Long Term Goals: 8-12 weeks - progressing not met  1.Report decreased ankle pain  < / =  2/10  to increase tolerance for ambulation  2.Patient goal: improve ambulation and be able to tolerate ADL's  3.Increase strength to 4+/5 for  LE strength  to increase tolerance for ADL and work activities.  4. Pt will report at CJ level (20-40% impaired) on Modified FIM score for mobility to demonstrate increase in LE function and mobility in home and community environment.     Plan   Plan of care Certification: 7/25/2022 to 10/25/22.    Continue with POC and progress as tolerated.     Ayaz  Leonel, PT

## 2022-08-15 ENCOUNTER — CLINICAL SUPPORT (OUTPATIENT)
Dept: REHABILITATION | Facility: HOSPITAL | Age: 67
End: 2022-08-15
Payer: MEDICARE

## 2022-08-15 DIAGNOSIS — G89.29 CHRONIC PAIN OF LEFT ANKLE: Primary | ICD-10-CM

## 2022-08-15 DIAGNOSIS — R26.9 ABNORMAL GAIT: ICD-10-CM

## 2022-08-15 DIAGNOSIS — M25.572 CHRONIC PAIN OF LEFT ANKLE: Primary | ICD-10-CM

## 2022-08-15 PROCEDURE — 97110 THERAPEUTIC EXERCISES: CPT | Mod: PN

## 2022-08-15 NOTE — PROGRESS NOTES
Physical Therapy Daily Treatment Note     Name: Lela COLEMAN [facility] Number: [id number]    Therapy Diagnosis:   Encounter Diagnoses   Name Primary?    Chronic pain of left ankle Yes    Abnormal gait      Physician: Atiya Rosas DPM    Visit Date: 8/15/2022  Physician Orders: PT Eval and Treat   Medical Diagnosis from Referral: S93.409A (ICD-10-CM) - Ankle sprain  Evaluation Date: 7/25/2022  Authorization Period Expiration: 12/31/22  Plan of Care Expiration: 10/25/22  Progress Note Due: 8/25/22  Visit # / Visits authorized: 3/4  FOTO: 60% limitation  FOTO 1st follow up: 48% limitation  FOTO 2nd follow up:     Precautions: Standard, Diabetes and stents, HTN, RA,       Time In: 10:01  Time Out: 11:00  Total Appointment Time (timed & untimed codes): 59 minutes    Subjective     Pt reports: felt sore for a couple days following last session, about the same today.     She was compliant with home exercise program.  Response to previous treatment: n/a  Functional change: improved ankle range of motion.     Pain: 4/10  Location: left ankle    Objective       Active Range of Motion:   Ankle Right Left   DF (knee extended) 5 5   Plantarflexion 40 40   Inversion 15 20   Eversion 20 20      Strength:  Ankle Right Left   Dorsiflexion 4/5 4/5   Plantarflexion 4/5 3+/5   Inversion 4/5 4/5   Eversion 4/5 4-/5*     Joint mobility: hypomobile left distal/proximal tib fib, TC, subtalar eversion    **All exercises billed as therapeutic exercises per medicaid guidelines**    PT technician assisted with treatment under direct supervision of PT     Lela received therapeutic exercises to develop strength and endurance for 40 minutes including:  Seated heel raise with 4 inch box 40x  Standing DL heel raise 3x15  Ankle 4 way green TB 3x10 each  Standing hip abd/ext 2x15  S/l hip abd 2x10 each  Clams red TB 2x15 each  Double leg shuttle 62# 3x15  Single leg shuttle 37# 2x15  Double leg heel raise 62# 3x15    Not  performed  Seated pronation with towel 30x  Standing pronation with towel for cueing 30x  SLR 3x10 each    Lela received the following manual therapy techniques: Joint mobilizations were applied to the: ankle for 15 minutes, including:  Ankle assessment  TC mobilization grades II-V  Subtalar mobilizations eversion  Distal/proximal tib/fib mobilizations    Home Exercises Provided and Patient Education Provided     Education provided:   - HEP  - ankle DF     Written Home Exercises Provided: yes.  Exercises were reviewed and Lela was able to demonstrate them prior to the end of the session.  Lela demonstrated good  understanding of the education provided.     See EMR under Patient Instructions for exercises provided prior visit.    Assessment     Lela showed improved gait and decreased symptoms following manual techniques. Focused on ankle joint mobility and strengthening in new range. Continued progression with LE strengthening.     Lela Is progressing well towards her goals.   Pt prognosis is Fair.     Pt will continue to benefit from skilled outpatient physical therapy to address the deficits listed in the problem list box on initial evaluation, provide pt/family education and to maximize pt's level of independence in the home and community environment.     Pt's spiritual, cultural and educational needs considered and pt agreeable to plan of care and goals.    Anticipated barriers to physical therapy: none    GOALS: Short Term Goals:  4-6 weeks - progressing not met  1.Report decreased ankle pain  < / =  4/10  to increase tolerance for ambulation  2. Increase ROM by 5-10 degrees in order to walk with min to no compensation.  3. Increase strength by 1/3 MMT grade for  ankle  to increase tolerance for ADL and work activities.  4. Pt to tolerate HEP to improve ROM and independence with ADL's     Long Term Goals: 8-12 weeks - progressing not met  1.Report decreased ankle pain  < / =  2/10  to  increase tolerance for ambulation  2.Patient goal: improve ambulation and be able to tolerate ADL's  3.Increase strength to 4+/5 for  LE strength  to increase tolerance for ADL and work activities.  4. Pt will report at CJ level (20-40% impaired) on Modified FIM score for mobility to demonstrate increase in LE function and mobility in home and community environment.     Plan   Plan of care Certification: 7/25/2022 to 10/25/22.    Continue with POC and progress as tolerated.     Ayaz Castaneda, PT

## 2022-08-17 ENCOUNTER — CLINICAL SUPPORT (OUTPATIENT)
Dept: REHABILITATION | Facility: HOSPITAL | Age: 67
End: 2022-08-17
Attending: PODIATRIST
Payer: MEDICAID

## 2022-08-17 DIAGNOSIS — G89.29 CHRONIC PAIN OF LEFT ANKLE: Primary | ICD-10-CM

## 2022-08-17 DIAGNOSIS — M25.572 CHRONIC PAIN OF LEFT ANKLE: Primary | ICD-10-CM

## 2022-08-17 DIAGNOSIS — R26.9 ABNORMAL GAIT: ICD-10-CM

## 2022-08-17 PROCEDURE — 97110 THERAPEUTIC EXERCISES: CPT | Mod: PN

## 2022-08-17 NOTE — PROGRESS NOTES
"    Physical Therapy Daily Treatment Note     Name: Lela COLEMAN [facility] Number: [id number]    Therapy Diagnosis:   Encounter Diagnoses   Name Primary?    Chronic pain of left ankle Yes    Abnormal gait      Physician: Atiya Rosas, [facility]    Visit Date: 8/17/2022  Physician Orders: PT Eval and Treat   Medical Diagnosis from Referral: S93.409A (ICD-10-CM) - Ankle sprain  Evaluation Date: 7/25/2022  Authorization Period Expiration: 12/31/22  Plan of Care Expiration: 10/25/22  Progress Note Due: 8/25/22  Visit # / Visits authorized: 5/4  FOTO: 60% limitation  FOTO 1st follow up: 48% limitation  FOTO 2nd follow up:     Precautions: Standard, Diabetes and stents, HTN, RA,       Time In: 9:00  Time Out: 9:54  Total Appointment Time (timed & untimed codes): 54 minutes    Subjective     Pt reports: felt sore yesterday in her leg muscles, but feeling better today.     She was compliant with home exercise program.  Response to previous treatment: n/a  Functional change: improved ankle range of motion.     Pain: 4/10  Location: left ankle    Objective       Active Range of Motion:   Ankle Right Left   DF (knee extended) 5 5   Plantarflexion 40 40   Inversion 15 20   Eversion 20 20      Strength:  Ankle Right Left   Dorsiflexion 4/5 4/5   Plantarflexion 4/5 3+/5   Inversion 4/5 4/5   Eversion 4/5 4-/5*     Joint mobility: hypomobile left distal/proximal tib fib, TC, subtalar eversion    **All exercises billed as therapeutic exercises per medicaid guidelines**    PT technician assisted with treatment under direct supervision of PT     Lela received therapeutic exercises to develop strength and endurance for 40 minutes including:  NuStep level 1 5" for range of motion, strength and cardiovascular endurance  Seated heel raise with 4 inch box 40x  Standing DL heel raise 3x15  Ankle 4 way green TB 3x10 each  Bridges 20x5"  S/l hip abd 2x10 each  Clams red TB 2x15 each  Double leg shuttle 62# 4x15  Single leg shuttle 37# " 3x10  Double leg heel raise 62# 4x15    Not performed  Seated pronation with towel 30x  Standing pronation with towel for cueing 30x  SLR 3x10 each    Lela received the following manual therapy techniques: Joint mobilizations were applied to the: ankle for 14 minutes, including:  Ankle assessment  TC mobilization grades II-V  Subtalar mobilizations eversion  Distal/proximal tib/fib mobilizations    Home Exercises Provided and Patient Education Provided     Education provided:   - HEP  - ankle DF     Written Home Exercises Provided: yes.  Exercises were reviewed and Lela was able to demonstrate them prior to the end of the session.  Lela demonstrated good  understanding of the education provided.     See EMR under Patient Instructions for exercises provided prior visit.    Assessment     Lela had some improved gait mechanics today but still having difficulty with ambulating and pushing off. Improvement of symptoms following manual techniques. Continued to focus on ankle mobility, ankle strengthening, and LE strengthening overall.     Lela Is progressing well towards her goals.   Pt prognosis is Fair.     Pt will continue to benefit from skilled outpatient physical therapy to address the deficits listed in the problem list box on initial evaluation, provide pt/family education and to maximize pt's level of independence in the home and community environment.     Pt's spiritual, cultural and educational needs considered and pt agreeable to plan of care and goals.    Anticipated barriers to physical therapy: none    GOALS: Short Term Goals:  4-6 weeks - progressing not met  1.Report decreased ankle pain  < / =  4/10  to increase tolerance for ambulation  2. Increase ROM by 5-10 degrees in order to walk with min to no compensation.  3. Increase strength by 1/3 MMT grade for  ankle  to increase tolerance for ADL and work activities.  4. Pt to tolerate HEP to improve ROM and independence with  ADL's     Long Term Goals: 8-12 weeks - progressing not met  1.Report decreased ankle pain  < / =  2/10  to increase tolerance for ambulation  2.Patient goal: improve ambulation and be able to tolerate ADL's  3.Increase strength to 4+/5 for  LE strength  to increase tolerance for ADL and work activities.  4. Pt will report at CJ level (20-40% impaired) on Modified FIM score for mobility to demonstrate increase in LE function and mobility in home and community environment.     Plan   Plan of care Certification: 7/25/2022 to 10/25/22.    Continue with POC and progress as tolerated.     Ayaz Castaneda, PT

## 2022-08-23 ENCOUNTER — CLINICAL SUPPORT (OUTPATIENT)
Dept: REHABILITATION | Facility: HOSPITAL | Age: 67
End: 2022-08-23
Attending: PODIATRIST
Payer: MEDICAID

## 2022-08-23 DIAGNOSIS — R26.9 ABNORMAL GAIT: ICD-10-CM

## 2022-08-23 DIAGNOSIS — G89.29 CHRONIC PAIN OF LEFT ANKLE: Primary | ICD-10-CM

## 2022-08-23 DIAGNOSIS — M25.572 CHRONIC PAIN OF LEFT ANKLE: Primary | ICD-10-CM

## 2022-08-23 PROCEDURE — 97140 MANUAL THERAPY 1/> REGIONS: CPT | Mod: PN

## 2022-08-23 PROCEDURE — 97110 THERAPEUTIC EXERCISES: CPT | Mod: PN

## 2022-08-23 NOTE — PROGRESS NOTES
"    Physical Therapy Daily Treatment Note     Name: Lela COLEMAN [facility] Number: [id number]    Therapy Diagnosis:   Encounter Diagnoses   Name Primary?    Chronic pain of left ankle Yes    Abnormal gait      Physician: Atiya Rosas DPM    Visit Date: 8/23/2022  Physician Orders: PT Eval and Treat   Medical Diagnosis from Referral: S93.409A (ICD-10-CM) - Ankle sprain  Evaluation Date: 7/25/2022  Authorization Period Expiration: 12/31/22  Plan of Care Expiration: 10/25/22  Progress Note Due: 8/25/22  Visit # / Visits authorized: 6/24  FOTO: 60% limitation  FOTO 1st follow up: 48% limitation  FOTO 2nd follow up:     Precautions: Standard, Diabetes and stents, HTN, RA,       Time In: 9:01  Time Out: 9:57  Total Appointment Time (timed & untimed codes): 56 minutes    Subjective     Pt reports: doing better today, no pain with walking today.     She was compliant with home exercise program.  Response to previous treatment: n/a  Functional change: improved ankle range of motion.     Pain: 4/10  Location: left ankle    Objective       Active Range of Motion:   Ankle Right Left   DF (knee extended) 5 5   Plantarflexion 40 40   Inversion 15 20   Eversion 20 20      Strength:  Ankle Right Left   Dorsiflexion 4/5 4/5   Plantarflexion 4/5 3+/5   Inversion 4/5 4/5   Eversion 4/5 4-/5*     Joint mobility: hypomobile left distal/proximal tib fib, TC, subtalar eversion    **All exercises billed as therapeutic exercises per medicaid guidelines**    PT technician assisted with treatment under direct supervision of PT     Lela received therapeutic exercises to develop strength and endurance for 44 minutes including:  NuStep level 1 6' for range of motion, strength and cardiovascular endurance  Seated heel raise with 4 inch box 40x  Standing DL heel raise 3x15  Ankle 4 way green TB 3x10 each  Bridges 20x5"  S/l hip abd 2x10 each  Clams red TB 2x15 each  Double leg shuttle 62# 4x15  Single leg shuttle 37# 3x10  Double leg " heel raise 62# 4x15  Squats to 18in box with blue foam pad 3x10    Not performed  Seated pronation with towel 30x  Standing pronation with towel for cueing 30x  SLR 3x10 each    Lela received the following manual therapy techniques: Joint mobilizations were applied to the: ankle for 12 minutes, including:  Ankle assessment  TC mobilization grades II-V  Subtalar mobilizations eversion  Distal/proximal tib/fib mobilizations    Home Exercises Provided and Patient Education Provided     Education provided:   - HEP  - ankle DF     Written Home Exercises Provided: yes.  Exercises were reviewed and Lela was able to demonstrate them prior to the end of the session.  Lela demonstrated good  understanding of the education provided.     See EMR under Patient Instructions for exercises provided prior visit.    Assessment     Lela ambulated today without any pain for the first time in a while. Still getting some irritation with push off. Continued to focus on LE strengthening and joint mobility on her ankle.     Lela Is progressing well towards her goals.   Pt prognosis is Fair.     Pt will continue to benefit from skilled outpatient physical therapy to address the deficits listed in the problem list box on initial evaluation, provide pt/family education and to maximize pt's level of independence in the home and community environment.     Pt's spiritual, cultural and educational needs considered and pt agreeable to plan of care and goals.    Anticipated barriers to physical therapy: none    GOALS: Short Term Goals:  4-6 weeks - progressing not met  1.Report decreased ankle pain  < / =  4/10  to increase tolerance for ambulation  2. Increase ROM by 5-10 degrees in order to walk with min to no compensation.  3. Increase strength by 1/3 MMT grade for  ankle  to increase tolerance for ADL and work activities.  4. Pt to tolerate HEP to improve ROM and independence with ADL's     Long Term Goals: 8-12 weeks -  progressing not met  1.Report decreased ankle pain  < / =  2/10  to increase tolerance for ambulation  2.Patient goal: improve ambulation and be able to tolerate ADL's  3.Increase strength to 4+/5 for  LE strength  to increase tolerance for ADL and work activities.  4. Pt will report at CJ level (20-40% impaired) on Modified FIM score for mobility to demonstrate increase in LE function and mobility in home and community environment.     Plan   Plan of care Certification: 7/25/2022 to 10/25/22.    Continue with POC and progress as tolerated.     Ayaz Castaneda, PT

## 2023-02-04 ENCOUNTER — OFFICE VISIT (OUTPATIENT)
Dept: URGENT CARE | Facility: CLINIC | Age: 68
End: 2023-02-04
Payer: MEDICAID

## 2023-02-04 VITALS
SYSTOLIC BLOOD PRESSURE: 117 MMHG | DIASTOLIC BLOOD PRESSURE: 65 MMHG | RESPIRATION RATE: 18 BRPM | WEIGHT: 225 LBS | TEMPERATURE: 98 F | HEIGHT: 64 IN | HEART RATE: 84 BPM | OXYGEN SATURATION: 96 % | BODY MASS INDEX: 38.41 KG/M2

## 2023-02-04 DIAGNOSIS — Z86.79 HISTORY OF HYPERTENSION: ICD-10-CM

## 2023-02-04 DIAGNOSIS — I25.10 CORONARY ARTERY DISEASE, UNSPECIFIED VESSEL OR LESION TYPE, UNSPECIFIED WHETHER ANGINA PRESENT, UNSPECIFIED WHETHER NATIVE OR TRANSPLANTED HEART: ICD-10-CM

## 2023-02-04 DIAGNOSIS — B34.9 VIRAL SYNDROME: ICD-10-CM

## 2023-02-04 DIAGNOSIS — Z86.39 HISTORY OF TYPE 2 DIABETES MELLITUS: ICD-10-CM

## 2023-02-04 DIAGNOSIS — H00.015 HORDEOLUM EXTERNUM OF LEFT LOWER EYELID: ICD-10-CM

## 2023-02-04 DIAGNOSIS — Z20.822 COVID-19 VIRUS NOT DETECTED: ICD-10-CM

## 2023-02-04 DIAGNOSIS — L03.213 PRESEPTAL CELLULITIS OF LEFT LOWER EYELID: ICD-10-CM

## 2023-02-04 DIAGNOSIS — R05.9 COUGH, UNSPECIFIED TYPE: Primary | ICD-10-CM

## 2023-02-04 PROBLEM — Z95.5 PRESENCE OF CORONARY ANGIOPLASTY IMPLANT AND GRAFT: Status: ACTIVE | Noted: 2021-09-21

## 2023-02-04 PROBLEM — I49.9 CARDIAC ARRHYTHMIA, UNSPECIFIED: Status: ACTIVE | Noted: 2021-03-30

## 2023-02-04 LAB
CTP QC/QA: YES
FLUAV AG NPH QL: NEGATIVE
FLUBV AG NPH QL: NEGATIVE
S PYO RRNA THROAT QL PROBE: NEGATIVE
SARS-COV-2 AG RESP QL IA.RAPID: NEGATIVE

## 2023-02-04 PROCEDURE — 87880 POCT RAPID STREP A: ICD-10-PCS | Mod: QW,,, | Performed by: NURSE PRACTITIONER

## 2023-02-04 PROCEDURE — 87804 POCT INFLUENZA A/B: ICD-10-PCS | Mod: QW,,, | Performed by: NURSE PRACTITIONER

## 2023-02-04 PROCEDURE — 1157F PR ADVANCE CARE PLAN OR EQUIV PRESENT IN MEDICAL RECORD: ICD-10-PCS | Mod: CPTII,S$GLB,, | Performed by: NURSE PRACTITIONER

## 2023-02-04 PROCEDURE — 1157F ADVNC CARE PLAN IN RCRD: CPT | Mod: CPTII,S$GLB,, | Performed by: NURSE PRACTITIONER

## 2023-02-04 PROCEDURE — 87811 SARS-COV-2 COVID19 W/OPTIC: CPT | Mod: QW,S$GLB,, | Performed by: NURSE PRACTITIONER

## 2023-02-04 PROCEDURE — 1160F PR REVIEW ALL MEDS BY PRESCRIBER/CLIN PHARMACIST DOCUMENTED: ICD-10-PCS | Mod: CPTII,S$GLB,, | Performed by: NURSE PRACTITIONER

## 2023-02-04 PROCEDURE — 3008F BODY MASS INDEX DOCD: CPT | Mod: CPTII,S$GLB,, | Performed by: NURSE PRACTITIONER

## 2023-02-04 PROCEDURE — 87811 SARS CORONAVIRUS 2 ANTIGEN POCT, MANUAL READ: ICD-10-PCS | Mod: QW,S$GLB,, | Performed by: NURSE PRACTITIONER

## 2023-02-04 PROCEDURE — 3074F PR MOST RECENT SYSTOLIC BLOOD PRESSURE < 130 MM HG: ICD-10-PCS | Mod: CPTII,S$GLB,, | Performed by: NURSE PRACTITIONER

## 2023-02-04 PROCEDURE — 99214 OFFICE O/P EST MOD 30 MIN: CPT | Mod: S$GLB,,, | Performed by: NURSE PRACTITIONER

## 2023-02-04 PROCEDURE — 1159F PR MEDICATION LIST DOCUMENTED IN MEDICAL RECORD: ICD-10-PCS | Mod: CPTII,S$GLB,, | Performed by: NURSE PRACTITIONER

## 2023-02-04 PROCEDURE — 3074F SYST BP LT 130 MM HG: CPT | Mod: CPTII,S$GLB,, | Performed by: NURSE PRACTITIONER

## 2023-02-04 PROCEDURE — 3078F PR MOST RECENT DIASTOLIC BLOOD PRESSURE < 80 MM HG: ICD-10-PCS | Mod: CPTII,S$GLB,, | Performed by: NURSE PRACTITIONER

## 2023-02-04 PROCEDURE — 1160F RVW MEDS BY RX/DR IN RCRD: CPT | Mod: CPTII,S$GLB,, | Performed by: NURSE PRACTITIONER

## 2023-02-04 PROCEDURE — 87880 STREP A ASSAY W/OPTIC: CPT | Mod: QW,,, | Performed by: NURSE PRACTITIONER

## 2023-02-04 PROCEDURE — 3078F DIAST BP <80 MM HG: CPT | Mod: CPTII,S$GLB,, | Performed by: NURSE PRACTITIONER

## 2023-02-04 PROCEDURE — 87804 INFLUENZA ASSAY W/OPTIC: CPT | Mod: QW,,, | Performed by: NURSE PRACTITIONER

## 2023-02-04 PROCEDURE — 1159F MED LIST DOCD IN RCRD: CPT | Mod: CPTII,S$GLB,, | Performed by: NURSE PRACTITIONER

## 2023-02-04 PROCEDURE — 3008F PR BODY MASS INDEX (BMI) DOCUMENTED: ICD-10-PCS | Mod: CPTII,S$GLB,, | Performed by: NURSE PRACTITIONER

## 2023-02-04 PROCEDURE — 99214 PR OFFICE/OUTPT VISIT, EST, LEVL IV, 30-39 MIN: ICD-10-PCS | Mod: S$GLB,,, | Performed by: NURSE PRACTITIONER

## 2023-02-04 RX ORDER — BENZONATATE 100 MG/1
100 CAPSULE ORAL 3 TIMES DAILY PRN
Qty: 30 CAPSULE | Refills: 0 | Status: SHIPPED | OUTPATIENT
Start: 2023-02-04 | End: 2023-02-14

## 2023-02-04 RX ORDER — CITALOPRAM 40 MG/1
TABLET, FILM COATED ORAL
COMMUNITY

## 2023-02-04 RX ORDER — TRIAMCINOLONE ACETONIDE 1 MG/G
1 CREAM TOPICAL 2 TIMES DAILY
COMMUNITY
Start: 2023-01-17

## 2023-02-04 RX ORDER — HYDROCORTISONE 25 MG/G
CREAM TOPICAL
COMMUNITY
Start: 2023-01-18

## 2023-02-04 RX ORDER — TRAZODONE HYDROCHLORIDE 150 MG/1
150 TABLET ORAL NIGHTLY
COMMUNITY
Start: 2022-12-07

## 2023-02-04 RX ORDER — CLOBETASOL PROPIONATE 0.46 MG/ML
SOLUTION TOPICAL
COMMUNITY
Start: 2022-12-21

## 2023-02-04 RX ORDER — ALBUTEROL SULFATE 90 UG/1
2 AEROSOL, METERED RESPIRATORY (INHALATION) EVERY 6 HOURS PRN
Qty: 18 G | Refills: 0 | Status: SHIPPED | OUTPATIENT
Start: 2023-02-04

## 2023-02-04 RX ORDER — DOXYCYCLINE 100 MG/1
100 CAPSULE ORAL 2 TIMES DAILY
Qty: 14 CAPSULE | Refills: 0 | Status: SHIPPED | OUTPATIENT
Start: 2023-02-04 | End: 2023-02-11

## 2023-02-04 RX ORDER — ERYTHROMYCIN 5 MG/G
OINTMENT OPHTHALMIC EVERY 8 HOURS
Qty: 3.5 G | Refills: 0 | Status: SHIPPED | OUTPATIENT
Start: 2023-02-04 | End: 2023-02-11

## 2023-02-04 NOTE — PATIENT INSTRUCTIONS
Doxycycline twice a day for 7 days.  Always take with food and a full glass of water do not lay down for 1 hour after taking each dose and protect her skin against the sun due to excessive sun sensitivity while taking doxycycline    Symptomatic treatment to include:    Rest, increase fluid intake to include 50 % water, 50% electrolyte replacement  Tylenol as directed for fever, sore throat, headache, body aches.   Zrytec 10 mg and flonase daily until prescriptions are completed for sinus symptoms and to reduce inflammation.   Tessalon perles cough pills as needed day or night.  Helps best for dry, throat irritation cough.   Coricidin HBP over-the-counter as directed for sinus congestion  Warm, salt water gargles, over the counter throat lozenges or sprays as desires.   Liquid benadryl and maalox 1 to 1 concentration, gargle and spit for temporary relief for sore throat.  ER for difficulty breathing not relieved by rest, excessive lethargy and/or change in mental status  Albuterol inhaler every 4-6 hours while awake as needed for persistent cough, chest tightness, wheezing, shortness a breath

## 2023-02-04 NOTE — PROGRESS NOTES
"Subjective:       Patient ID: Lela Mckinney is a 67 y.o. female.    Vitals:  height is 5' 4" (1.626 m) and weight is 102.1 kg (225 lb). Her oral temperature is 97.7 °F (36.5 °C). Her blood pressure is 117/65 and her pulse is 84. Her respiration is 18 and oxygen saturation is 96%.     Chief Complaint: Cough    Pt states that "she has been experiencing chest pain, SOB, and wheezing. She also has a bump on her eye and her eye has swelling."    Onset of eye sx's 4 days ago.  Onset of viral symptoms 2 days ago.    Cough  This is a new problem. The current episode started in the past 7 days (thursday feb 2). The problem has been gradually worsening. The problem occurs constantly. The cough is Productive of sputum. Associated symptoms include chills, ear pain (right), a fever, headaches, myalgias, nasal congestion, postnasal drip, a sore throat, sweats and wheezing (yesterday). Pertinent negatives include no chest pain, hemoptysis or shortness of breath. Nothing aggravates the symptoms. She has tried nothing for the symptoms.     Constitution: Positive for chills, fatigue and fever.   HENT:  Positive for ear pain (right), congestion, postnasal drip and sore throat.    Cardiovascular:  Negative for chest pain.   Eyes:  Positive for eye discharge, eye pain (left lower eyelid) and eyelid swelling (left lower). Negative for eye trauma.   Respiratory:  Positive for chest tightness, cough, sputum production and wheezing (yesterday). Negative for bloody sputum and shortness of breath.    Gastrointestinal:  Positive for diarrhea. Negative for abdominal pain, nausea and vomiting.   Musculoskeletal:  Positive for muscle ache.   Neurological:  Positive for headaches.     Objective:      Physical Exam   Constitutional: She is oriented to person, place, and time. She appears well-developed. She is cooperative.  Non-toxic appearance. She appears ill. No distress. well-groomed and obesityawake  HENT:   Head: Normocephalic and " atraumatic. Head is with left periorbital erythema. Head is without right periorbital erythema.   Ears:   Right Ear: Tympanic membrane, external ear and ear canal normal.   Left Ear: Tympanic membrane, external ear and ear canal normal.   Nose: Rhinorrhea and congestion present.   Mouth/Throat: Oropharynx is clear and moist. Mucous membranes are moist. No oropharyngeal exudate or posterior oropharyngeal erythema.   Eyes: Conjunctivae and EOM are normal. Pupils are equal, round, and reactive to light. Right eye exhibits no chemosis, no discharge, no exudate and no hordeolum. Left eye exhibits discharge and hordeolum. Left eye exhibits no chemosis and no exudate. Right conjunctiva is not injected. Right conjunctiva has no hemorrhage. Left conjunctiva is not injected. Left conjunctiva has no hemorrhage. Extraocular movement intact      Comments: See attached photo   Cardiovascular: Normal rate, regular rhythm and normal heart sounds.   Pulmonary/Chest: Effort normal and breath sounds normal. No respiratory distress. She has no wheezes. She has no rhonchi. She has no rales.   Abdominal: Normal appearance.   Neurological: no focal deficit. She is alert and oriented to person, place, and time.   Skin: Skin is warm, dry and not diaphoretic. Capillary refill takes 2 to 3 seconds.   Psychiatric: Her behavior is normal. Mood normal.   Nursing note and vitals reviewed.          Assessment:       1. Cough, unspecified type    2. History of hypertension    3. History of type 2 diabetes mellitus    4. Coronary artery disease, unspecified vessel or lesion type, unspecified whether angina present, unspecified whether native or transplanted heart    5. COVID-19 virus not detected    6. Viral syndrome    7. Hordeolum externum of left lower eyelid    8. Preseptal cellulitis of left lower eyelid        Flu a/b negative  Strep a negative    Plan:         Cough, unspecified type  -     SARS Coronavirus 2 Antigen, POCT Manual Read  -      POCT rapid strep A  -     POCT Influenza A/B Rapid Antigen    History of hypertension    History of type 2 diabetes mellitus    Coronary artery disease, unspecified vessel or lesion type, unspecified whether angina present, unspecified whether native or transplanted heart    COVID-19 virus not detected    Viral syndrome  -     erythromycin (ROMYCIN) ophthalmic ointment; Place into the left eye every 8 (eight) hours. for 7 days  Dispense: 3.5 g; Refill: 0  -     benzonatate (TESSALON) 100 MG capsule; Take 1 capsule (100 mg total) by mouth 3 (three) times daily as needed for Cough.  Dispense: 30 capsule; Refill: 0  -     albuterol (VENTOLIN HFA) 90 mcg/actuation inhaler; Inhale 2 puffs into the lungs every 6 (six) hours as needed for Wheezing. Rescue  Dispense: 18 g; Refill: 0    Hordeolum externum of left lower eyelid  -     doxycycline (MONODOX) 100 MG capsule; Take 1 capsule (100 mg total) by mouth 2 (two) times daily. for 7 days  Dispense: 14 capsule; Refill: 0    Preseptal cellulitis of left lower eyelid  -     doxycycline (MONODOX) 100 MG capsule; Take 1 capsule (100 mg total) by mouth 2 (two) times daily. for 7 days  Dispense: 14 capsule; Refill: 0    Doxycycline twice a day for 7 days.  Always take with food and a full glass of water do not lay down for 1 hour after taking each dose and protect her skin against the sun due to excessive sun sensitivity while taking doxycycline    Symptomatic treatment to include:    Rest, increase fluid intake to include 50 % water, 50% electrolyte replacement  Tylenol as directed for fever, sore throat, headache, body aches.   Zrytec 10 mg and flonase daily until prescriptions are completed for sinus symptoms and to reduce inflammation.   Tessalon perles cough pills as needed day or night.  Helps best for dry, throat irritation cough.   Coricidin HBP over-the-counter as directed for sinus congestion  Warm, salt water gargles, over the counter throat lozenges or sprays as  desires.   Liquid benadryl and maalox 1 to 1 concentration, gargle and spit for temporary relief for sore throat.  ER for difficulty breathing not relieved by rest, excessive lethargy and/or change in mental status  Albuterol inhaler every 4-6 hours while awake as needed for persistent cough, chest tightness, wheezing, shortness a breath

## 2023-07-27 DIAGNOSIS — Z12.31 ENCOUNTER FOR SCREENING MAMMOGRAM FOR MALIGNANT NEOPLASM OF BREAST: Primary | ICD-10-CM

## 2023-07-27 DIAGNOSIS — Z78.0 POSTMENOPAUSE: ICD-10-CM

## 2023-08-17 ENCOUNTER — HOSPITAL ENCOUNTER (EMERGENCY)
Facility: HOSPITAL | Age: 68
Discharge: HOME | End: 2023-08-17
Attending: EMERGENCY MEDICINE
Payer: MEDICAID

## 2023-08-17 VITALS
BODY MASS INDEX: 37.56 KG/M2 | SYSTOLIC BLOOD PRESSURE: 129 MMHG | TEMPERATURE: 98 F | HEIGHT: 64 IN | DIASTOLIC BLOOD PRESSURE: 66 MMHG | RESPIRATION RATE: 18 BRPM | OXYGEN SATURATION: 96 % | HEART RATE: 67 BPM | WEIGHT: 220 LBS

## 2023-08-17 DIAGNOSIS — S46.911A STRAIN OF RIGHT SHOULDER, INITIAL ENCOUNTER: ICD-10-CM

## 2023-08-17 DIAGNOSIS — S09.90XA INJURY OF HEAD, INITIAL ENCOUNTER: ICD-10-CM
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
## 2023-08-17 DIAGNOSIS — R52 PAIN: ICD-10-CM

## 2023-08-17 PROCEDURE — 25000003 PHARM REV CODE 250: Performed by: NURSE PRACTITIONER

## 2023-08-17 PROCEDURE — 99284 EMERGENCY DEPT VISIT MOD MDM: CPT | Mod: 25

## 2023-08-17 RX ORDER — LIDOCAINE 50 MG/G
1 PATCH TOPICAL
Status: DISCONTINUED | OUTPATIENT
Start: 2023-08-17 | End: 2023-08-17 | Stop reason: HOSPADM

## 2023-08-17 RX ORDER — ATORVASTATIN CALCIUM 80 MG/1
80 TABLET, FILM COATED ORAL
COMMUNITY
Start: 2023-03-14

## 2023-08-17 RX ORDER — AMLODIPINE BESYLATE 5 MG/1
5 TABLET ORAL
COMMUNITY
Start: 2023-05-06

## 2023-08-17 RX ORDER — ACETAMINOPHEN 500 MG
1000 TABLET ORAL
Status: COMPLETED | OUTPATIENT
Start: 2023-08-17 | End: 2023-08-17

## 2023-08-17 RX ADMIN — ACETAMINOPHEN 1000 MG: 500 TABLET ORAL at 11:08

## 2023-08-17 RX ADMIN — LIDOCAINE 1 PATCH: 50 PATCH CUTANEOUS at 11:08

## 2023-08-17 NOTE — ED PROVIDER NOTES
"Encounter Date: 8/17/2023       History     Chief Complaint   Patient presents with    Fall     Slip and fall 15 min PTA, hit left buttock, right arm and left side of head; denies LOC     Patient is a 68 y.o. female who presents to the ED 08/17/2023 with a chief complaint of fall that occurred approximately 10 minutes prior to arrival.  Patient states he was walking in her feet slipped from under her.  She states this has happened in the past where she sometimes loses her footing.  She states that when she went down she was standing between a dresser and something else and tried to catch herself with both arms and hurt her right shoulder and the dresser hit her left ribs and then the left side of her head.  She states she felt a little dizzy immediately afterwards but did not lose consciousness.  States since then her left ribs hurt, her right shoulder hurts, and her right side of her neck.  She denies any bowel or bladder incontinence.  She denies any numbness or weakness.  She does not have any current vision changes.  She denies any abdominal pain.  She states her "tailbone hurts" but denies any back pain. She denies any hip pain and has been able to walk since the injury.  She takes Plavix.  She denies any other use of blood thinners or antiplatelets.  She is a history of CAD, diabetes, depression, hypertension,GERD, hepatitis. PSH noted below.              Review of patient's allergies indicates:   Allergen Reactions    Indocin [indomethacin sodium] Itching    Statins-hmg-coa reductase inhibitors     Dextromethorphan Anxiety     Past Medical History:   Diagnosis Date    Cardiac arrhythmia     Chronic pain     Depression     Diabetes     Diabetes     GERD (gastroesophageal reflux disease)     Hepatitis     Hepatitis C    Hypertension     Hypertension     Insomnia      Past Surgical History:   Procedure Laterality Date    ANGIOGRAM, CORONARY, WITH LEFT HEART CATHETERIZATION  10/26/2021    Procedure: Angiogram, " Coronary, with Left Heart Cath;  Surgeon: Tony Masters MD;  Location: [facility] CATH/EP LAB;  Service: Cardiology;;    ARTHROSCOPY OF BOTH KNEES      COLONOSCOPY N/A 5/10/2018    Procedure: COLONOSCOPY;  Surgeon: Skinny Fisher MD;  Location: [facility] ENDO;  Service: Endoscopy;  Laterality: N/A;    CORONARY ANGIOGRAPHY N/A 9/21/2021    Procedure: ANGIOGRAM, CORONARY ARTERY;  Surgeon: Tony Masters MD;  Location: [facility] CATH/EP LAB;  Service: Cardiology;  Laterality: N/A;    HYSTERECTOMY      LEFT HEART CATHETERIZATION Left 9/21/2021    Procedure: CATHETERIZATION, HEART, LEFT;  Surgeon: Tony Masters MD;  Location: [facility] CATH/EP LAB;  Service: Cardiology;  Laterality: Left;    ROBOT-ASSISTED REPAIR OF UMBILICAL HERNIA USING DA AUBRIE XI N/A 5/1/2019    Procedure: XI ROBOTIC REPAIR, HERNIA, UMBILICAL;  Surgeon: Sp Craft MD;  Location: [facility] OR;  Service: General;  Laterality: N/A;    shoulder repair      SHOULDER SURGERY  1981    THYROIDECTOMY      THYROIDECTOMY, PARTIAL  1978    TUBAL LIGATION      TUBAL LIGATION  1981     Family History   Problem Relation Age of Onset    Heart disease Mother     Diabetes Mother     Crohn's disease Father     Rectal cancer Father     Breast cancer Daughter     Breast cancer Maternal Aunt      Social History     Tobacco Use    Smoking status: Former     Current packs/day: 0.00     Types: Cigarettes     Quit date: 4/26/2008     Years since quitting: 15.3    Smokeless tobacco: Never    Tobacco comments:     Quit 10 yrs ago   Substance Use Topics    Alcohol use: No    Drug use: No     Review of Systems   Constitutional:  Negative for chills and fever.   HENT:  Negative for sore throat.    Respiratory:  Negative for chest tightness and shortness of breath.    Cardiovascular:  Negative for chest pain.   Gastrointestinal:  Negative for abdominal pain.   Genitourinary:  Negative for dysuria.   Musculoskeletal:  Positive for arthralgias, myalgias and neck pain. Negative for gait problem,  joint swelling and neck stiffness.   Skin:  Negative for rash and wound.   Neurological:  Positive for headaches. Negative for syncope, facial asymmetry, speech difficulty, weakness and numbness.   Hematological:  Does not bruise/bleed easily.       Physical Exam     Initial Vitals [08/17/23 0941]   BP Pulse Resp Temp SpO2   (!) 145/73 73 18 97.9 °F (36.6 °C) 99 %      MAP       --         Physical Exam    Nursing note and vitals reviewed.  Constitutional: Vital signs are normal. She appears well-developed and well-nourished.   HENT:   Head: Normocephalic and atraumatic.   Eyes: Pupils are equal, round, and reactive to light.   Neck: Neck supple. There are no signs of injury. No crepitus.   Normal range of motion.  Cardiovascular:  Normal rate, regular rhythm, normal heart sounds and intact distal pulses.     Exam reveals no gallop and no friction rub.       No murmur heard.  Pulmonary/Chest: Breath sounds normal. She has no wheezes. She has no rhonchi. She has no rales. She exhibits tenderness. She exhibits no bony tenderness, no laceration and no crepitus.     Abdominal: Abdomen is soft. Bowel sounds are normal. There is no abdominal tenderness.   Musculoskeletal:      Right shoulder: Tenderness present. No swelling, deformity, effusion, laceration, bony tenderness or crepitus. Decreased range of motion. Normal strength. Normal pulse.      Left shoulder: Normal.      Right upper arm: Normal.      Cervical back: Normal range of motion and neck supple. Tenderness present. No swelling, deformity, signs of trauma, spasms or crepitus. Spinous process tenderness and muscular tenderness present. Normal range of motion.      Thoracic back: Normal.      Lumbar back: Normal.        Back:      Neurological: She is alert and oriented to person, place, and time. She has normal strength.   No focal neurological deficits noted. Cranial nerves III-XII grossly intact. Equal rapid alternating movements noted.       Skin: Skin is  warm, dry and intact.   Psychiatric: She has a normal mood and affect. Her speech is normal and behavior is normal.         ED Course   Procedures  Labs Reviewed - No data to display       Imaging Results              X-Ray Sacrum And Coccyx (Final result)  Result time 08/17/23 11:30:37      Final result by Rosalie Chaudhry MD (08/17/23 11:30:37)                   Impression:      There is no evidence displaced sacral fracture.      Electronically signed by: Rosalie Chaudhry MD  Date:    08/17/2023  Time:    11:30               Narrative:    EXAMINATION:  XR SACRUM AND COCCYX    CLINICAL HISTORY:  Pain, unspecified    TECHNIQUE:  Two or three views of the sacrum and coccyx were performed.    COMPARISON:  None    FINDINGS:  There is osteopenia.  There is no evidence displaced sacral fracture.  The adjacent soft tissues are unremarkable.  There are degenerative changes of both hips.                                       CT Cervical Spine Without Contrast (Final result)  Result time 08/17/23 11:28:41      Final result by Rosalie Chaudhry MD (08/17/23 11:28:41)                   Impression:      There are degenerative changes of the cervical spine without evidence acute bony injury of the cervical spine.      Electronically signed by: Rosalie Chaudhry MD  Date:    08/17/2023  Time:    11:28               Narrative:    EXAMINATION:  CT CERVICAL SPINE WITHOUT CONTRAST    CLINICAL HISTORY:  Neck trauma (Age >= 65y);    TECHNIQUE:  Low dose axial images, sagittal and coronal reformations were performed though the cervical spine.  Contrast was not administered.    COMPARISON:  None    FINDINGS:  There is no evidence fracture nor malalignment.  There are degenerative changes seen throughout the cervical spine.  There is no prevertebral soft tissue swelling.                                       X-Ray Ribs 2 View Left (Final result)  Result time 08/17/23 11:30:11      Final result by Rosalie Chaudhry MD (08/17/23 11:30:11)                    Impression:      There is no evidence left pneumothorax or displaced left rib fracture.      Electronically signed by: Rosalie Chaudhry MD  Date:    08/17/2023  Time:    11:30               Narrative:    EXAMINATION:  XR RIBS 2 VIEW LEFT    CLINICAL HISTORY:  Pain, unspecified    TECHNIQUE:  Two views of the left ribs were performed.    COMPARISON:  None.    FINDINGS:  There is no pneumothorax, pleural effusion or focal consolidation.  The cardiac silhouette is within normal limits.  The trachea is midline.  The osseous structures are unremarkable.                                       X-Ray Chest AP Portable (Final result)  Result time 08/17/23 11:29:34      Final result by Rosalie Chaudhry MD (08/17/23 11:29:34)                   Impression:      No acute abnormality.      Electronically signed by: Rosalie Chaudhry MD  Date:    08/17/2023  Time:    11:29               Narrative:    EXAMINATION:  XR CHEST AP PORTABLE    CLINICAL HISTORY:  pain;    TECHNIQUE:  Single frontal view of the chest was performed.    COMPARISON:  09/17/2021    FINDINGS:  The lungs are clear, with normal appearance of pulmonary vasculature and no pleural effusion or pneumothorax.    The cardiac silhouette is normal in size. The hilar and mediastinal contours are unremarkable.    Bones are intact.                                       CT Head Without Contrast (Final result)  Result time 08/17/23 11:24:49      Final result by Dallin Lovett MD (08/17/23 11:24:49)                   Impression:      1. No acute intracranial CT findings or adverse changes from 09/14/2021.  2. Left maxillary sinus opacification.  Outpatient ENT referral is recommended if not already performed.      Electronically signed by: Dallin Lovett  Date:    08/17/2023  Time:    11:24               Narrative:    EXAMINATION:  CT HEAD WITHOUT CONTRAST    CLINICAL HISTORY:  Head trauma, minor (Age >= 65y);    TECHNIQUE:  Low dose axial CT images obtained  throughout the head without intravenous contrast. Sagittal and coronal reconstructions were performed.    COMPARISON:  Head CT 09/14/2021.    FINDINGS:  Brain: Redemonstrated prominent mineralization of the bilateral globus paladi and dentate nuclei which is nonspecific but can be seen in the setting of underlying parathyroid metabolic derangement.  No acute intracranial hemorrhage, midline shift or mass effect, or space-occupying extra-axial fluid collections.  Scratch no CT evidence of an acute major vascular territorial infarct.    Ventricles: The ventricles, sulci, and cisterns are within normal limits.    Skull: The osseous structures are unremarkable in appearance.    Extracranial soft tissues: Limited imaging is within normal limits.    Other: The left maxillary sinus is completely opacified and Ali partially imaged.  This is new from the prior examination.  Remaining visualized paranasal sinuses are clear.  Mastoid air cells are clear.  No focal abnormality of the orbits appreciated.                                       X-Ray Shoulder Trauma Right (Final result)  Result time 08/17/23 11:29:11      Final result by Rosalie Chaudhry MD (08/17/23 11:29:11)                   Impression:      There are degenerative changes of the right shoulder.      Electronically signed by: Rosalie Chaudhry MD  Date:    08/17/2023  Time:    11:29               Narrative:    EXAMINATION:  XR SHOULDER TRAUMA 3 VIEW RIGHT    CLINICAL HISTORY:  Pain, unspecified    TECHNIQUE:  Three or four views of the right shoulder were performed.    COMPARISON:  None    FINDINGS:  There is no evidence fracture or malalignment.  There are degenerative changes of the right shoulder.                                       Medications   LIDOcaine 5 % patch 1 patch (1 patch Transdermal Patch Applied 8/17/23 1100)   acetaminophen tablet 1,000 mg (1,000 mg Oral Given 8/17/23 1100)     Medical Decision Making:   Differential Diagnosis:    Fracture  Dislocation  contusion       APC / Resident Notes:   Patient is a 68 y.o. female who presents to the ED 08/17/2023  who underwent emergent evaluation for ground level mechanical fall that occurred today. Pt awake and alert on my examination.  Head atraumatic and normocephalic.  She did undergo emergent head CT and cervical spine CT without acute findings. I do not think any emergent intracranial process such as ICH or SDH.  She has no focal neurological deficits in the emergency department is ambulatory.  No signs of cord compromise.  Patient has 5/5 strength normal sensation bilateral upper and lower extremities and I do not think emergent MRI is indicated at this time.  Vital signs normal.  Patient also complains of right shoulder pain.  X-ray without evidence of acute fracture dislocation.  Discussed possible other ligament or rotator cuff injury and she is placed in sling for this and given referral to Orthopedics.  No midline T or L-spine tenderness.  She has mild tenderness at the sacrum.  X-ray without evidence of acute fracture.  I do not think acute fracture.  Pain with no crepitus.  There is no palpable bony abnormality.  Bilateral breath sounds clear respirations even nonlabored.  She is in no acute respiratory distress.  Chest x-ray and left rib x-ray without acute findings.  I do not think rib fx or pneumo or hemothorax present.  Pt ambulatory and well appearing at discharge in NAD. Based on my clinical evaluation, I do not appreciate any immediate, emergent, or life threatening condition or etiology that warrants additional workup today and feel that the patient can be discharged with close follow up care. . Follow up and return precautions discussed; patient verbalized understanding and is agreeable to plan of care. Patient discharged home in stable condition.                             Clinical Impression:   Final diagnoses:  [R52] Pain  [W19.XXXA] Fall, initial encounter  (Primary)  [S09.90XA] Injury of head, initial encounter  [S46.911A] Strain of right shoulder, initial encounter        ED Disposition Condition    Discharge Stable          ED Prescriptions    None       Follow-up Information       Follow up With Specialties Details Why Contact Info Additional Information    Nikko Salas MD Family Medicine In 2 days  [address] [facility]   [address]  [place] [address]  [phone]       [place] [facility] -  Emergency Medicine  As needed, If symptoms worsen [address] [place] [address] [address] [place] [address] 1st floor             Katelyn Garibay, ОЛЕГ  08/17/23 [id number]

## 2023-08-17 NOTE — ED NOTES
Slipped and fell on object coming out of bathroom. Injury to left side. Hit head on dresser, neck pain, rt and lt shoulder, left side and hip.

## 2023-09-08 ENCOUNTER — HOSPITAL ENCOUNTER (OUTPATIENT)
Dept: RADIOLOGY | Facility: HOSPITAL | Age: 68
Discharge: HOME | End: 2023-09-08
Attending: STUDENT IN AN ORGANIZED HEALTH CARE EDUCATION/TRAINING PROGRAM
Payer: MEDICAID

## 2023-09-08 ENCOUNTER — HOSPITAL ENCOUNTER (OUTPATIENT)
Dept: RADIOLOGY | Facility: HOSPITAL | Age: 68
Discharge: HOME | End: 2023-09-08
Attending: STUDENT IN AN ORGANIZED HEALTH CARE EDUCATION/TRAINING PROGRAM
Payer: MEDICARE

## 2023-09-08 VITALS — BODY MASS INDEX: 37.56 KG/M2 | WEIGHT: 220 LBS | HEIGHT: 64 IN

## 2023-09-08 DIAGNOSIS — Z78.0 POSTMENOPAUSE: ICD-10-CM

## 2023-09-08 DIAGNOSIS — Z12.31 ENCOUNTER FOR SCREENING MAMMOGRAM FOR MALIGNANT NEOPLASM OF BREAST: ICD-10-CM

## 2023-09-08 PROCEDURE — 77067 SCR MAMMO BI INCL CAD: CPT | Mod: TC,PO

## 2023-09-08 PROCEDURE — 77080 DXA BONE DENSITY AXIAL: CPT | Mod: TC,PO

## 2023-09-13 DIAGNOSIS — R92.8 ABNORMAL MAMMOGRAM: Primary | ICD-10-CM

## 2023-09-25 ENCOUNTER — HOSPITAL ENCOUNTER (EMERGENCY)
Facility: HOSPITAL | Age: 68
Discharge: HOME | End: 2023-09-25
Attending: EMERGENCY MEDICINE
Payer: MEDICARE

## 2023-09-25 VITALS
TEMPERATURE: 98 F | RESPIRATION RATE: 18 BRPM | DIASTOLIC BLOOD PRESSURE: 56 MMHG | HEIGHT: 64 IN | HEART RATE: 62 BPM | SYSTOLIC BLOOD PRESSURE: 114 MMHG | OXYGEN SATURATION: 99 % | WEIGHT: 220 LBS | BODY MASS INDEX: 37.56 KG/M2

## 2023-09-25 DIAGNOSIS — S39.012A LUMBAR STRAIN, INITIAL ENCOUNTER: Primary | ICD-10-CM

## 2023-09-25 DIAGNOSIS — M54.50 LUMBAR PAIN: ICD-10-CM

## 2023-09-25 PROCEDURE — 25000003 PHARM REV CODE 250: Performed by: PHYSICIAN ASSISTANT

## 2023-09-25 PROCEDURE — 96372 THER/PROPH/DIAG INJ SC/IM: CPT | Performed by: PHYSICIAN ASSISTANT

## 2023-09-25 PROCEDURE — 63600175 PHARM REV CODE 636 W HCPCS: Performed by: PHYSICIAN ASSISTANT

## 2023-09-25 PROCEDURE — 99284 EMERGENCY DEPT VISIT MOD MDM: CPT

## 2023-09-25 RX ORDER — HYDROCODONE BITARTRATE AND ACETAMINOPHEN 5; 325 MG/1; MG/1
1 TABLET ORAL EVERY 6 HOURS PRN
Qty: 12 TABLET | Refills: 0 | Status: SHIPPED | OUTPATIENT
Start: 2023-09-25 | End: 2023-09-28

## 2023-09-25 RX ORDER — KETOROLAC TROMETHAMINE 30 MG/ML
30 INJECTION, SOLUTION INTRAMUSCULAR; INTRAVENOUS
Status: COMPLETED | OUTPATIENT
Start: 2023-09-25 | End: 2023-09-25

## 2023-09-25 RX ORDER — OXYCODONE HYDROCHLORIDE 5 MG/1
5 TABLET ORAL
Status: COMPLETED | OUTPATIENT
Start: 2023-09-25 | End: 2023-09-25

## 2023-09-25 RX ORDER — DICLOFENAC SODIUM 50 MG/1
50 TABLET, DELAYED RELEASE ORAL 2 TIMES DAILY PRN
Qty: 20 TABLET | Refills: 0 | Status: SHIPPED | OUTPATIENT
Start: 2023-09-25

## 2023-09-25 RX ORDER — LIDOCAINE 50 MG/G
1 PATCH TOPICAL ONCE
Status: DISCONTINUED | OUTPATIENT
Start: 2023-09-25 | End: 2023-09-25 | Stop reason: HOSPADM

## 2023-09-25 RX ORDER — ACETAMINOPHEN 500 MG
1000 TABLET ORAL
Status: COMPLETED | OUTPATIENT
Start: 2023-09-25 | End: 2023-09-25

## 2023-09-25 RX ADMIN — OXYCODONE HYDROCHLORIDE 5 MG: 5 TABLET ORAL at 01:09

## 2023-09-25 RX ADMIN — ACETAMINOPHEN 1000 MG: 500 TABLET ORAL at 01:09

## 2023-09-25 RX ADMIN — LIDOCAINE 5% 1 PATCH: 700 PATCH TOPICAL at 11:09

## 2023-09-25 RX ADMIN — KETOROLAC TROMETHAMINE 30 MG: 30 INJECTION, SOLUTION INTRAMUSCULAR at 11:09

## 2023-09-25 NOTE — ED PROVIDER NOTES
Encounter Date: 9/25/2023       History     Chief Complaint   Patient presents with    Back Pain     Started Thursday / denies injury     Lela Mckinney is a 68 y.o. female presenting for evaluation of lower back pain, persisting for the last few days after lifting her grandchild.  She states the pain worsens with movement and standing.  No fever, no chills.  No dysuria or hematuria.  No numbness, tingling or weakness.  No loss of bowel or bladder control.  She hasn't taken any medication for her symptoms.   She has a past medical history of Cardiac arrhythmia, Chronic pain, Depression, Diabetes, Diabetes, GERD (gastroesophageal reflux disease), Hepatitis, Hypertension, Hypertension, and Insomnia.      The history is provided by the patient.     Review of patient's allergies indicates:   Allergen Reactions    Indocin [indomethacin sodium] Itching    Statins-hmg-coa reductase inhibitors     Dextromethorphan Anxiety     Past Medical History:   Diagnosis Date    Cardiac arrhythmia     Chronic pain     Depression     Diabetes     Diabetes     GERD (gastroesophageal reflux disease)     Hepatitis     Hepatitis C    Hypertension     Hypertension     Insomnia      Past Surgical History:   Procedure Laterality Date    ANGIOGRAM, CORONARY, WITH LEFT HEART CATHETERIZATION  10/26/2021    Procedure: Angiogram, Coronary, with Left Heart Cath;  Surgeon: Tony Masters MD;  Location: [facility] CATH/EP LAB;  Service: Cardiology;;    ARTHROSCOPY OF BOTH KNEES      COLONOSCOPY N/A 5/10/2018    Procedure: COLONOSCOPY;  Surgeon: Skinny Fisher MD;  Location: [facility];  Service: Endoscopy;  Laterality: N/A;    CORONARY ANGIOGRAPHY N/A 9/21/2021    Procedure: ANGIOGRAM, CORONARY ARTERY;  Surgeon: Tony Masters MD;  Location: [facility] CATH/EP LAB;  Service: Cardiology;  Laterality: N/A;    HYSTERECTOMY      LEFT HEART CATHETERIZATION Left 9/21/2021    Procedure: CATHETERIZATION, HEART, LEFT;  Surgeon: Tony Masters MD;  Location: [facility]  CATH/EP LAB;  Service: Cardiology;  Laterality: Left;    ROBOT-ASSISTED REPAIR OF UMBILICAL HERNIA USING DA AUBRIE XI N/A 5/1/2019    Procedure: XI ROBOTIC REPAIR, HERNIA, UMBILICAL;  Surgeon: Sp Craft MD;  Location: [facility];  Service: General;  Laterality: N/A;    shoulder repair      SHOULDER SURGERY  1981    THYROIDECTOMY      THYROIDECTOMY, PARTIAL  1978    TUBAL LIGATION      TUBAL LIGATION  1981     Family History   Problem Relation Age of Onset    Heart disease Mother     Diabetes Mother     Crohn's disease Father     Rectal cancer Father     Breast cancer Daughter     Breast cancer Maternal Aunt      Social History     Tobacco Use    Smoking status: Former     Current packs/day: 0.00     Types: Cigarettes     Quit date: 4/26/2008     Years since quitting: 15.4    Smokeless tobacco: Never    Tobacco comments:     Quit 10 yrs ago   Substance Use Topics    Alcohol use: No    Drug use: No     Review of Systems   Constitutional:  Negative for chills and fever.   Respiratory:  Negative for cough, chest tightness, shortness of breath and wheezing.    Cardiovascular:  Negative for chest pain and palpitations.   Musculoskeletal:  Positive for arthralgias, back pain and myalgias. Negative for joint swelling, neck pain and neck stiffness.   Skin:  Negative for color change, pallor, rash and wound.   Neurological:  Negative for weakness and numbness.   Hematological:  Does not bruise/bleed easily.       Physical Exam     Initial Vitals [09/25/23 1038]   BP Pulse Resp Temp SpO2   (!) 140/63 67 18 98.2 °F (36.8 °C) 97 %      MAP       --         Physical Exam    Nursing note and vitals reviewed.  Constitutional: She appears well-developed and well-nourished. She is not diaphoretic. No distress.   HENT:   Head: Normocephalic and atraumatic.   Neck: Neck supple.   Normal range of motion.  Cardiovascular:  Normal rate, regular rhythm, normal heart sounds and intact distal pulses.     Exam reveals no gallop and no  friction rub.       No murmur heard.  Pulmonary/Chest: Breath sounds normal. No respiratory distress. She has no wheezes. She has no rhonchi. She has no rales.   Abdominal: Abdomen is soft. She exhibits no distension and no mass. There is no abdominal tenderness.   Musculoskeletal:         General: Tenderness present. No edema. Normal range of motion.      Cervical back: Normal range of motion and neck supple.      Comments: TTP noted to right lumbar paraspinal muscles, extending into right upper buttocks.  No midline spinous process tenderness noted.  No decreased ROM, decreased strength or loss of sensation to bilateral lower extremities.  Palpable pedal pulses.       Neurological: She is alert and oriented to person, place, and time. She has normal strength. No sensory deficit.   Skin: Skin is warm and dry. No rash and no abscess noted. No erythema.   Psychiatric: She has a normal mood and affect.         ED Course   Procedures  Labs Reviewed - No data to display       Imaging Results    None          Medications   ketorolac injection 30 mg (30 mg Intramuscular Given 9/25/23 1155)   acetaminophen tablet 1,000 mg (1,000 mg Oral Given 9/25/23 1303)   oxyCODONE immediate release tablet 5 mg (5 mg Oral Given 9/25/23 1303)     Medical Decision Making  Fracture  Dislocation  Sprain  Contusion  Strain  Spasm  Cauda Equina   Epidural Abscess    Pt emergently evaluated here in the ED.    Symptoms consistent with lumbar strain. Low suspicion for acute fracture, cauda equina or epidural abscess.  No indication for emergent imaging at this time.  She will be referred to PMR for further evaluation as needed.  She is discharged home to follow-up with her PCP for re-evaluation as needed.  Patient voices understanding and is agreeable to the plan.  Specific return precautions are given.      Risk  OTC drugs.  Prescription drug management.              Attending Attestation:     Physician Attestation Statement for NP/PA:   I have  directed and reviewed the workup performed by the PA/NP.  I performed the substantive portion of the medical decision making.     Other NP/PA Attestation Additions:    History of Present Illness: 68-year-old female presents with lower back pain status post lifting her grandchild.    Medical Decision Making: Initial differential diagnosis included but not limited to strain, sprain, and muscle spasm.  I am in agreement with the physician assistant's  assessment, treatment, and plan of care.                             Clinical Impression:   Final diagnoses:  [S39.012A] Lumbar strain, initial encounter (Primary)  [M54.50] Lumbar pain        ED Disposition Condition    Discharge Stable          ED Prescriptions       Medication Sig Dispense Start Date End Date Auth. Provider    diclofenac (VOLTAREN) 50 MG EC tablet Take 1 tablet (50 mg total) by mouth 2 (two) times daily as needed (pain). 20 tablet 9/25/2023 -- Ana Maria Gold PA-C    HYDROcodone-acetaminophen (NORCO) 5-325 mg per tablet Take 1 tablet by mouth every 6 (six) hours as needed for Pain. 12 tablet 9/25/2023 9/28/2023 Ana Maria Gold PA-C          Follow-up Information       Follow up With Specialties Details Why Contact Info Additional Information    [place] [facility] - ED Emergency Medicine  As needed, If symptoms worsen [address] [place] [address] [address] [place] [address] 1st floor    John Espino MD Physical Medicine and Rehabilitation  for re-evaluation of back pain as needed [address] [place] [address] [address]ORGE ALBERTO [address], [address]  [place] [address]  [phone]                Ana Maria Gold PA-C  09/25/23 1739       Derick Herbert MD  09/25/23 174

## 2023-09-25 NOTE — FIRST PROVIDER EVALUATION
Emergency Department TeleTriage Encounter Note      CHIEF COMPLAINT    Chief Complaint   Patient presents with    Back Pain     Started Thursday / denies injury       VITAL SIGNS   Initial Vitals [09/25/23 1038]   BP Pulse Resp Temp SpO2   (!) 140/63 67 18 98.2 °F (36.8 °C) 97 %      MAP       --            ALLERGIES    Review of patient's allergies indicates:   Allergen Reactions    Indocin [indomethacin sodium] Itching    Statins-hmg-coa reductase inhibitors     Dextromethorphan Anxiety       PROVIDER TRIAGE NOTE  Patient presents with complaint of back pain. Denied injury. Reports no numbness/tingling to leg. Denied loss of control of bowels/bladder.      Phy:   Constitutional: well nourished, well developed, appearing stated age, NAD   HEENT: NCAT, symmetrical lids, No obvious facial deformity.  Normal phonation. Normal Conjunctiva   Neck: NAROM   Respiratory: Normal effort.  No obvious use of accessory muscles   Musculoskeletal: Moved upper extremities well   Neuro: Alert, answers questions appropriately    Psych: appropriate mood and affect      Initial orders will be placed and care will be transferred to an alternate provider when patient is roomed for a full evaluation. Any additional orders and the final disposition will be determined by that provider.        ORDERS  Labs Reviewed - No data to display    ED Orders (720h ago, onward)      None              Virtual Visit Note: The provider triage portion of this emergency department evaluation and documentation was performed via [employer], a HIPAA-compliant telemedicine application, in concert with a tele-presenter in the room. A face to face patient evaluation with one of my colleagues will occur once the patient is placed in an emergency department room.      DISCLAIMER: This note was prepared with [employer] voice recognition transcription software. Garbled syntax, mangled pronouns, and other bizarre constructions may be attributed to that software  system.

## 2023-09-26 ENCOUNTER — TELEPHONE (OUTPATIENT)
Dept: FAMILY MEDICINE | Facility: CLINIC | Age: 68
End: 2023-09-26
Payer: MEDICAID

## 2023-10-02 ENCOUNTER — HOSPITAL ENCOUNTER (OUTPATIENT)
Dept: RADIOLOGY | Facility: HOSPITAL | Age: 68
Discharge: HOME | End: 2023-10-02
Attending: PHYSICAL MEDICINE & REHABILITATION
Payer: MEDICAID

## 2023-10-02 ENCOUNTER — OFFICE VISIT (OUTPATIENT)
Dept: SPINE | Facility: CLINIC | Age: 68
End: 2023-10-02
Payer: MEDICARE

## 2023-10-02 ENCOUNTER — TELEPHONE (OUTPATIENT)
Dept: SPINE | Facility: CLINIC | Age: 68
End: 2023-10-02

## 2023-10-02 VITALS — HEIGHT: 64 IN | WEIGHT: 220 LBS | BODY MASS INDEX: 37.56 KG/M2

## 2023-10-02 DIAGNOSIS — M54.41 ACUTE RIGHT-SIDED LOW BACK PAIN WITH RIGHT-SIDED SCIATICA: Primary | ICD-10-CM

## 2023-10-02 DIAGNOSIS — S39.012A LUMBAR STRAIN, INITIAL ENCOUNTER: ICD-10-CM

## 2023-10-02 DIAGNOSIS — M54.50 LUMBAR PAIN: ICD-10-CM

## 2023-10-02 DIAGNOSIS — M54.41 ACUTE RIGHT-SIDED LOW BACK PAIN WITH RIGHT-SIDED SCIATICA: ICD-10-CM

## 2023-10-02 PROCEDURE — 72114 X-RAY EXAM L-S SPINE BENDING: CPT | Mod: 26,,, | Performed by: RADIOLOGY

## 2023-10-02 PROCEDURE — 72114 X-RAY EXAM L-S SPINE BENDING: CPT | Mod: TC

## 2023-10-02 PROCEDURE — 99204 OFFICE O/P NEW MOD 45 MIN: CPT | Mod: S$GLB,,, | Performed by: PHYSICAL MEDICINE & REHABILITATION

## 2023-10-02 PROCEDURE — 72114 XR LUMBAR SPINE 5 VIEW WITH FLEX AND EXT: ICD-10-PCS | Mod: 26,,, | Performed by: RADIOLOGY

## 2023-10-02 PROCEDURE — 99204 PR OFFICE/OUTPT VISIT, NEW, LEVL IV, 45-59 MIN: ICD-10-PCS | Mod: S$GLB,,, | Performed by: PHYSICAL MEDICINE & REHABILITATION

## 2023-10-02 NOTE — PROGRESS NOTES
SUBJECTIVE:    Patient ID: Lela Mckinney is a 68 y.o. female.    Chief Complaint: Neck Pain and Back Pain    This is a 68-year-old woman who sees  for her primary care.  History of diabetes hypertension and coronary artery disease status post stent.  She is on Plavix.  Dr. Masters is her cardiologist.  No cancer history.  Long history of low back pain.  In the remote past she is gone to physical therapy with some success.  She is had unspecified epidural injections also in the remote past she says with no benefit.  She presents however with an acute episode of right-sided low back pain radiates into the posterior portion of the right leg to the knee.  This started after lifting her grandchild onto the bed.  She twisted her back.  No immediate pain but when she tried to stand up she had severe discomfort.  She ended up going to the emergency room 9/25/23. She received a shot of Toradol, topical Lidoderm and oral Tylenol plus Percocet 5 mg. She was released on Voltaren 50 mg and hydrocodone.  She presents to me feeling significantly better.  Her complaints her as described above.  Worse with movement.  No bowel or bladder dysfunction fever chills sweats or unexpected weight loss.  Pain level is currently 5/10 but gets as high as 8 or 10/10 and interferes with her quality of life in terms of activities of daily living recreation and social activities.  I have no imaging to review          Past Medical History:   Diagnosis Date    Cardiac arrhythmia     Chronic pain     Depression     Diabetes     Diabetes     GERD (gastroesophageal reflux disease)     Hepatitis     Hepatitis C    Hypertension     Hypertension     Insomnia      Social History     Socioeconomic History    Marital status:    Tobacco Use    Smoking status: Former     Current packs/day: 0.00     Types: Cigarettes     Quit date: 4/26/2008     Years since quitting: 15.4    Smokeless tobacco: Never    Tobacco comments:     Quit 10 yrs  "ago   Substance and Sexual Activity    Alcohol use: No    Drug use: No    Sexual activity: Not Currently   Social History Narrative    ** Merged History Encounter **          Past Surgical History:   Procedure Laterality Date    ANGIOGRAM, CORONARY, WITH LEFT HEART CATHETERIZATION  10/26/2021    Procedure: Angiogram, Coronary, with Left Heart Cath;  Surgeon: Tony Masters MD;  Location: [facility] CATH/EP LAB;  Service: Cardiology;;    ARTHROSCOPY OF BOTH KNEES      COLONOSCOPY N/A 5/10/2018    Procedure: COLONOSCOPY;  Surgeon: Skinny Fisher MD;  Location: [facility] ENDO;  Service: Endoscopy;  Laterality: N/A;    CORONARY ANGIOGRAPHY N/A 9/21/2021    Procedure: ANGIOGRAM, CORONARY ARTERY;  Surgeon: Tony Masters MD;  Location: [facility] CATH/EP LAB;  Service: Cardiology;  Laterality: N/A;    HYSTERECTOMY      LEFT HEART CATHETERIZATION Left 9/21/2021    Procedure: CATHETERIZATION, HEART, LEFT;  Surgeon: Tony Masters MD;  Location: [facility] CATH/EP LAB;  Service: Cardiology;  Laterality: Left;    ROBOT-ASSISTED REPAIR OF UMBILICAL HERNIA USING DA AUBRIE XI N/A 5/1/2019    Procedure: XI ROBOTIC REPAIR, HERNIA, UMBILICAL;  Surgeon: Sp Craft MD;  Location: [facility] OR;  Service: General;  Laterality: N/A;    shoulder repair      SHOULDER SURGERY  1981    THYROIDECTOMY      THYROIDECTOMY, PARTIAL  1978    TUBAL LIGATION      TUBAL LIGATION  1981     Family History   Problem Relation Age of Onset    Heart disease Mother     Diabetes Mother     Crohn's disease Father     Rectal cancer Father     Breast cancer Daughter     Breast cancer Maternal Aunt      Vitals:    10/02/23 0855   Weight: 99.8 kg (220 lb 0.3 oz)   Height: 5' 4" (1.626 m)       Review of Systems   Constitutional:  Negative for chills, diaphoresis, fatigue, fever and unexpected weight change.   HENT:  Negative for trouble swallowing.    Eyes:  Negative for visual disturbance.   Respiratory:  Negative for shortness of breath.    Cardiovascular:  Negative for chest " pain.   Gastrointestinal:  Negative for abdominal pain, constipation, nausea and vomiting.   Genitourinary:  Negative for difficulty urinating.   Musculoskeletal:  Negative for arthralgias, back pain, gait problem, joint swelling, myalgias, neck pain and neck stiffness.   Neurological:  Negative for dizziness, speech difficulty, weakness, light-headedness, numbness and headaches.          Objective:      Physical Exam  Neurological:      Mental Status: She is alert and oriented to person, place, and time.      Comments: She is awake and in no acute distress  No point tenderness or palpable masses about the lumbar spine  Reflexes- +1-+2 reflexes at the following:   C5-Biceps   C6-Brachioradialis   C7-Triceps   L3/4-Patellar   S1-Achilles   Sada sign negative bilateral  Strength testing- 5/5 strength in the following muscle groups:  C5-Elbow flexion  C6-Wrist extension  C7-Elbow extension  C8-Finger flexion  T1-Finger abduction  L2-Hip flexion  L3-Knee extension  L4-Ankle dorsiflexion  L5-Great toe extension  S1-Ankle plantar flexion       Straight leg raise negative bilaterally             Assessment:       1. Acute right-sided low back pain with right-sided sciatica    2. Lumbar strain, initial encounter    3. Lumbar pain           Plan:     She has a nonfocal neurological examination and no historical red flags.  I suspect she has low back pain on basis of degenerative disc disease and facet arthropathy.  Has pain with facet loading.  She is getting better with the treatment already started.  I think she can continue conservative treatment.  We will get some x-rays on her lumbar spine and start her in physical therapy.  Follow-up at the completion of therapy.  Consider MRI and subsequent epidural steroid injection versus radiofrequency ablation      Acute right-sided low back pain with right-sided sciatica  -     X-Ray Lumbar Complete Including Flex And Ext; Future; Expected date: 10/02/2023  -     Ambulatory  referral/consult to Physical/Occupational Therapy; Future; Expected date: 10/09/2023    Lumbar strain, initial encounter  -     Ambulatory referral/consult to Physical Medicine Rehab    Lumbar pain  -     Ambulatory referral/consult to Physical Medicine Rehab

## 2023-10-02 NOTE — TELEPHONE ENCOUNTER
Spoke to pt and let her know xray results per Dr. Espino Her x-rays show expected degenerative changes of the lumbar spine with no fracture or malalignment.  Proceed with physical therapy. Pt verbalized understanding

## 2023-10-03 ENCOUNTER — CLINICAL SUPPORT (OUTPATIENT)
Dept: REHABILITATION | Facility: HOSPITAL | Age: 68
End: 2023-10-03
Payer: MEDICARE

## 2023-10-03 DIAGNOSIS — M54.41 ACUTE RIGHT-SIDED LOW BACK PAIN WITH RIGHT-SIDED SCIATICA: ICD-10-CM

## 2023-10-03 DIAGNOSIS — R26.89 DECREASED FUNCTIONAL MOBILITY: ICD-10-CM

## 2023-10-03 DIAGNOSIS — M25.60 DECREASED RANGE OF MOTION: ICD-10-CM

## 2023-10-03 DIAGNOSIS — M62.89 MUSCLE TIGHTNESS: ICD-10-CM

## 2023-10-03 DIAGNOSIS — R53.1 DECREASED STRENGTH: ICD-10-CM

## 2023-10-03 PROCEDURE — 97110 THERAPEUTIC EXERCISES: CPT | Mod: PN

## 2023-10-03 PROCEDURE — 97161 PT EVAL LOW COMPLEX 20 MIN: CPT | Mod: PN

## 2023-10-03 NOTE — PLAN OF CARE
[facility]  Physical Therapy Initial Evaluation    Name: Lela Mckinney  [facility] Number: [id number]    Therapy Diagnosis:   Encounter Diagnoses   Name Primary?    Acute right-sided low back pain with right-sided sciatica     Decreased strength     Muscle tightness     Decreased range of motion     Decreased functional mobility      Physician: John Espino MD   Physician Orders: PT Eval and Treat  Medical Diagnosis from Referral: M54.41 (ICD-10-CM) - Acute right-sided low back pain with right-sided sciatica   Evaluation Date: 10/3/2023  Authorization Period Expiration: 10/01/2024   Plan of Care Expiration: 1/30/2023    Progress Update: 11/3/2023  FOTO: 1 / 3   Visit # / Visits authorized: 1 / 1       PRECAUTIONS: Standard Precautions     Time In: 1600  Time Out: 1700  Total Appointment Time (timed & untimed codes): 60 minutes    SUBJECTIVE     Chief complaint:  P1:  Central low back pain   P2:  Radiating pain down posterior R leg to knee    History of current condition:  P1, P2:  Pain started about 1 week ago.  States that she was moving stuff around in her house which required to squat down.  States that she felt some throbbing after performing the squats.   States that she then bent over and lifted her grandson up off of the floor and when she attempted to get up from a seated position she was unable to walk.   States that she went to the ER about 3 days later and she received an injection and pain meds which have helped.  Denies numbness/tingling.        Previous episode:  has had back pain for years.    Aggravating Factors:  mopping, bending over, washing dishes, lifting  Easing Factors: meds, rest, heat    Imaging:      Impression:     1. There is multilevel degenerative disc and facet disease.  There is no fracture, malalignment or instability.  2. Atherosclerosis        Electronically signed by: Arias Jimenez MD  Date:                                             10/02/2023  Time:                                           09:49    Prior Therapy: Yes  Social History: Pt lives with their family  Occupation: Pt is retired.  Prior Level of Function: Independent with all ADLs  Current Level of Function: 65% of PLOF    Pain:  Current 3 /10, worst 9 /10, best 3 /10   Description: Aching, Dull, and Throbbing    Pts goals: Pt reported goal is to be able to mop her floors without pain.    _______________________________________________________  Medical History:   Past Medical History:   Diagnosis Date    Cardiac arrhythmia     Chronic pain     Depression     Diabetes     Diabetes     GERD (gastroesophageal reflux disease)     Hepatitis     Hepatitis C    Hypertension     Hypertension     Insomnia        Surgical History:   Lela Mckinney  has a past surgical history that includes Hysterectomy; Arthroscopy of both knees; Tubal ligation; shoulder repair; Thyroidectomy; Thyroidectomy, partial (1978); Tubal ligation (1981); Shoulder surgery (1981); Colonoscopy (N/A, 5/10/2018); Robot-assisted repair of umbilical hernia using da Reginald Xi (N/A, 5/1/2019); Left heart catheterization (Left, 9/21/2021); Coronary angiography (N/A, 9/21/2021); and ANGIOGRAM, CORONARY, WITH LEFT HEART CATHETERIZATION (10/26/2021).    Medications:   Lela has a current medication list which includes the following prescription(s): albuterol, alprazolam, amlodipine, amlodipine, ascorbic acid (vitamin c), ascorbic acid-multivit-min, aspirin, atorvastatin, azelastine, b complex vitamins, blood sugar diagnostic, citalopram, clobetasol, clopidogrel, coenzyme q10, diclofenac, ezetimibe, fexofenadine, fluticasone propionate, gabapentin, hydrocortisone, isosorbide mononitrate, lactobacillus rhamnosus gg, linagliptin, lisinopril-hydrochlorothiazide, metformin, metoprolol succinate, nitroglycerin, pantoprazole, psyllium, pulse oximeter, ranolazine, trazodone, triamcinolone acetonide 0.1%, and vitamin d.    Allergies:    Review of patient's allergies indicates:   Allergen Reactions    Indocin [indomethacin sodium] Itching    Statins-hmg-coa reductase inhibitors     Dextromethorphan Anxiety        OBJECTIVE   (x = not tested due to pain and/or inability to obtain test position)    RANGE OF MOTION:    Lumbar AROM/PROM Right  (spine)  10/3/2023 Left    10/3/2023 Goal   Lumbar Flexion (60) 45 c pain --- 55     Lumbar Extension (30) Wfl c pain --- 30     Lumbar Side Bending (25) wfl wfl 20     Lumbar Rotation wfl wfl 45         Hip AROM/PROM Right  10/3/2023 Left  10/3/2023 Goal   Hip Flexion (120) 100 c pain 100 115     Hip IR (45) 38 c pain 40 40     Hip ER (45) wfl wfl 40         Knee AROM/PROM Right  10/3/2023 Left  10/3/2023 Goal   Hyper - Zero - Flexion wfl wfl 0-0-135         STRENGTH:    L/E MMT Right  10/3/2023 Goal   Hip Flexion  3/5 5/5 B    Hip Extension  3/5 5/5 B   Hip Abduction  3+/5 5/5 B   Hip IR 3+/5 5/5 B   Hip ER 3+/5 5/5 B   Knee Flexion 4-/5 5/5 B   Knee Extension 4-/5 5/5 B   Ankle DF 4-/5 5/5 B   Ankle PF 4-/5 5/5 B   Ankle Inversion 4-/5 5/5 B   Ankle Eversion 4-/5 5/5 B   Big Toe Extension 4-/5 5/5 B     L/E MMT Left  10/3/2023 Goal   Hip Flexion  3+/5 5/5 B    Hip Extension  3+/5 5/5 B   Hip Abduction  3+/5 5/5 B   Hip IR 3+/5 5/5 B   Hip ER 3+/5 5/5 B   Knee Flexion 4-/5 5/5 B   Knee Extension 4-/5 5/5 B   Ankle DF 4-/5 5/5 B   Ankle PF 4-/5 5/5 B   Ankle Inversion 4-/5 5/5 B   Ankle Eversion 4-/5 5/5 B   Big Toe Extension 4-/5 5/5 B       MUSCLE LENGTH:     Muscle Tested  Right  10/3/2023 Left   10/3/2023 Goal   Hip Flexors  decreased decreased Normal B   Quadriceps normal normal Normal B   Hamstrings  decreased decreased Normal B   Piriformis  decreased decreased Normal B   Gastrocnemius  decreased decreased Normal B   Soleus  decreased decreased Normal B       SPECIAL TESTS:     Right  (spine)  10/3/2023 Left Goal   SLR Positive Positive Negative    Crossover SLR Negative Negative Negative    Slump  "Negative Negative Negative    90/90 Positive Positive Negative   Zaire Positive Positive Negative   Standing forward flexion test Negative Negative Negative   Sacral thrust Negative Negative Negative   SIJ Distraction Negative Negative Negative   SIJ Compression Negative Negative Negative       Sensation:  Sensation is intact to light touch    Palpation:  TTP from L3-L5 spinous processes.    Posture:  Pt presents with postural abnormalities which include: forward head, rounded shoulders, and ankle/foot pronation    Gait Analysis: The patient ambulated with the following assistive device: none; the pt presents with the following gait abnormalities: decreased step length, matteo, and arm swing; increased forward flexed posture, trunk sway     Movement Analysis:   Functional Test  Outcome   Double Leg Squat 1/4 squat with pain   Single Leg Step Down NT           TREATMENT   Total Treatment time separate from Evaluation: (10) minutes    Lela received therapeutic exercises to develop strength, endurance, ROM, flexibility, and posture for (10) minutes including: x = exercises performed     TherEx 10/3/2023    Seated hamstring stretch x 3x30 secs   Seated piriformis stretch x 3x30 secs   LTRs x 3x10          Plan for Next Visit:     Recumbent bike x 5 mins      Bridges 2x10    Clams red Theraband  3x10   Double Knee To Chest  with Physioball  3x10  Hip adduction with ball 3x10  TrA sets with PB  2x10     Seated hamstring stretch 3x30 secs Bilateral     Seated piriformis stretch 3x30 secs Bilateral   Seated Physioball rollout  3x10     Standing gastroc stretch with incline 3x30 secs Bilateral        Step ups 4"  2x10      Hip 3 way 2 x 10 Bilateral             Home Exercises and Patient Education Provided    Education/Self-Care provided:  Patient educated on the impairments noted above and the effects of physical therapy intervention to improve overall condition and quality of life.   Patient was educated on all the " above exercise prior/during/after for proper posture, positioning, and execution for safe performance with home exercise program.     Written Home Exercises Provided: yes. Prefers: Electronic Copies  Exercises were reviewed and Lela was able to demonstrate them prior to the end of the session.  Lela demonstrated good understanding of the education provided.     See EMR under Patient Instructions for exercises provided during therapy sessions.    ASSESSMENT   Lela is a 68 y.o. female referred to outpatient Physical Therapy with a medical diagnosis of M54.41 (ICD-10-CM) - Acute right-sided low back pain with right-sided sciatica . Lela presents with clinical signs and symptoms that support this diagnosis with decreased Lumbar ROM, decreased lower extremity strength, Lumbar joint(s) hypomobility, lower extremity neural tension, and impaired functional mobility. Radicular symptoms are present from the lumbar spine down into the posterior portion of her R thigh to knee.    The above impairments will be addressed through manual therapy techniques, therapeutic exercises, functional training, and modalities as necessary. Patient was treated and educated on exercises for home program, progression of therapy, and benefits of therapy to achieve full functional mobility. Patient will benefit from skilled physical therapy to meet short and long term goals and return to prior level of function.    Pt prognosis is Good.   Pt will benefit from skilled outpatient Physical Therapy to address the deficits stated above and in the chart below, provide pt/family education, and to maximize pt's level of independence.     Plan of care discussed with patient: Yes  Pt's spiritual, cultural and educational needs considered and patient is agreeable to the plan of care and goals as stated below:     Anticipated Barriers for therapy: none    Medical Necessity is demonstrated by the following:   History  Co-morbidities and  "personal factors that may impact the plan of care Co-morbidities:   advanced age and high BMI    Personal Factors:   no deficits     low   Examination  Body Structures and Functions, activity limitations and participation restrictions that may impact the plan of care Body Regions:   back    Body Systems:    gross symmetry  ROM  strength  gross coordinated movement  balance  motor control  motor learning    Participation Restrictions:   See above in "Current Level of Function"     Activity limitations:   Learning and applying knowledge  no deficits    General Tasks and Commands  no deficits    Communication  no deficits    Mobility  no deficits    Self care  no deficits    Domestic Life  no deficits    Interactions/Relationships  no deficits    Life Areas  no deficits    Community and Social Life  community life  recreation and leisure  no deficits         low   Clinical Presentation stable and uncomplicated low   Decision Making/ Complexity Score: low       GOALS:  SHORT TERM GOALS: 4 weeks 10/3/2023   Recent signs and systems trend is improving in order to progress towards LTG's.    Patient will be independent with HEP in order to further progress and return to maximal function.    Pain rating at Worst: 5/10 in order to progress towards increased independence with activity.    Patient will be able to correct postural deviations in sitting and standing, to decrease pain and promote postural awareness for injury prevention.       LONG TERM GOALS: 8 weeks 10/3/2023   Patient will return to normal ADL, recreational, and work related activities with less pain and limitation.     Patient will improve AROM to stated goals in order to return to maximal functional potential.     Patient will improve Strength to stated goals of appropriate musculature in order to improve functional independence.     Pain Rating at Best: 1/10 to improve Quality of Life.     Patient will meet predicted functional outcome (FOTO) score: 80% to " increase self-worth & perceived functional ability.    Patient will have met/partially met personal goal of being able to walk without pain.          PLAN   Plan of care Certification: 10/3/2023 to 1/30/2023    Outpatient Physical Therapy 2 times weekly for 6 weeks to include any combination of the following interventions: virtual visits, dry needling, modalities, electrical stimulation (IFC, Pre-Mod, Attended with Functional Dry Needling), Manual Therapy, Moist Heat/ Ice, Neuromuscular Re-ed, Patient Education, Self Care, Therapeutic Exercise, Functional Training, and Therapeutic Activites     Thank you for this referral.    These services are reasonable and necessary for the conditions set forth above while under my care.    Castillo Saucedo, PT, DPT

## 2023-10-09 ENCOUNTER — CLINICAL SUPPORT (OUTPATIENT)
Dept: REHABILITATION | Facility: HOSPITAL | Age: 68
End: 2023-10-09
Payer: MEDICAID

## 2023-10-09 DIAGNOSIS — M54.41 ACUTE RIGHT-SIDED LOW BACK PAIN WITH RIGHT-SIDED SCIATICA: Primary | ICD-10-CM

## 2023-10-09 DIAGNOSIS — R26.89 DECREASED FUNCTIONAL MOBILITY: ICD-10-CM

## 2023-10-09 DIAGNOSIS — M25.60 DECREASED RANGE OF MOTION: ICD-10-CM

## 2023-10-09 DIAGNOSIS — M62.89 MUSCLE TIGHTNESS: ICD-10-CM

## 2023-10-09 DIAGNOSIS — R53.1 DECREASED STRENGTH: ICD-10-CM

## 2023-10-09 PROCEDURE — 97112 NEUROMUSCULAR REEDUCATION: CPT | Mod: PN

## 2023-10-09 PROCEDURE — 97530 THERAPEUTIC ACTIVITIES: CPT | Mod: PN

## 2023-10-09 NOTE — PROGRESS NOTES
"[facility] / [facility]  Physical Therapy Daily Treatment Note     Name: Lela COLEMAN [facility] Number: [id number]    Therapy Diagnosis:   Encounter Diagnoses   Name Primary?    Acute right-sided low back pain with right-sided sciatica Yes    Decreased strength     Muscle tightness     Decreased range of motion     Decreased functional mobility      Physician: John Espino MD    Visit Date: 10/9/2023    Physician: John Espino MD    Physician Orders: PT Eval and Treat  Medical Diagnosis from Referral: M54.41 (ICD-10-CM) - Acute right-sided low back pain with right-sided sciatica   Evaluation Date: 10/3/2023  Authorization Period Expiration: 10/01/2024   Plan of Care Expiration: 1/30/2023                Progress Update: 11/3/2023             FOTO: 1 / 3   Visit # / Visits authorized: 1 / 20       Time In:  1430    Time Out: 1523  Total Billable Time: 53 minutes    Precautions: Standard  Insurance: Payor: MEDICARE / Plan: MEDICARE PART A & B / Product Type: Government /     Subjective     Pt reports: that she is doing ok, no complaints.  She was compliant with home exercise program.  Response to previous treatment: N/A  Functional change: N/A    Pain: 2/10  Location: bilateral back      Objective     Lela participated in neuromuscular re-education activities to improve: Balance, Coordination, Kinesthetic, Sense, Proprioception, and Posture for 30 minutes. The following activities were included:    Recumbent bike x 5 mins      Bridges 2x10    Clams red Theraband  3x10   Double Knee To Chest  with Physioball  3x10  Hip adduction with ball 3x10  TrA sets with PB  2x10     Seated hamstring stretch 3x30 secs Bilateral     Seated piriformis stretch 3x30 secs Bilateral   Standing gastroc stretch with incline 3x30 secs Bilateral        Lela participated in dynamic functional therapeutic activities to improve functional performance for 23  minutes, including:    Step ups 4"  2x10  "     Hip 3 way 2 x 10 Bilateral   Seated Physioball rollout  3x10    Home Exercises Provided and Patient Education Provided     Education provided:   - low impact cardio options.    Written Home Exercises Provided: yes.  Exercises were reviewed and Lela was able to demonstrate them prior to the end of the session.  Lela demonstrated good  understanding of the education provided.     See EMR under Patient Instructions for exercises provided prior visit.    Assessment     Presents with B hip weakness and decreased lower extremity flexibility.  Displays poor endurance with therex.  Will continue to progress with therapy.  Lela is progressing well towards her goals.   Pt prognosis is Good.     Pt will continue to benefit from skilled outpatient physical therapy to address the deficits listed in the problem list box on initial evaluation, provide pt/family education and to maximize pt's level of independence in the home and community environment.     Pt's spiritual, cultural and educational needs considered and pt agreeable to plan of care and goals.    Anticipated barriers to physical therapy: None    Goals:     SHORT TERM GOALS: 4 weeks 10/9/2023     Recent signs and systems trend is improving in order to progress towards LTG's. ongoing   Patient will be independent with HEP in order to further progress and return to maximal function. ongoing   Pain rating at Worst: 5/10 in order to progress towards increased independence with activity. ongoing   Patient will be able to correct postural deviations in sitting and standing, to decrease pain and promote postural awareness for injury prevention.  ongoing      LONG TERM GOALS: 8 weeks 10/9/2023     Patient will return to normal ADL, recreational, and work related activities with less pain and limitation.  ongoing   Patient will improve AROM to stated goals in order to return to maximal functional potential.  ongoing   Patient will improve Strength to stated goals  of appropriate musculature in order to improve functional independence.  ongoing   Pain Rating at Best: 1/10 to improve Quality of Life.  ongoing   Patient will meet predicted functional outcome (FOTO) score: 80% to increase self-worth & perceived functional ability. ongoing   Patient will have met/partially met personal goal of being able to walk without pain. ongoing        Plan     Continue POC as previously stated.    Castillo Saucedo, PT

## 2023-10-10 PROCEDURE — 99283 EMERGENCY DEPT VISIT LOW MDM: CPT

## 2023-10-11 ENCOUNTER — HOSPITAL ENCOUNTER (EMERGENCY)
Facility: HOSPITAL | Age: 68
Discharge: HOME | End: 2023-10-11
Attending: EMERGENCY MEDICINE
Payer: MEDICAID

## 2023-10-11 VITALS
HEART RATE: 67 BPM | WEIGHT: 220 LBS | HEIGHT: 64 IN | OXYGEN SATURATION: 98 % | TEMPERATURE: 98 F | BODY MASS INDEX: 37.56 KG/M2 | DIASTOLIC BLOOD PRESSURE: 64 MMHG | SYSTOLIC BLOOD PRESSURE: 140 MMHG | RESPIRATION RATE: 17 BRPM

## 2023-10-11 DIAGNOSIS — S93.401A MODERATE ANKLE SPRAIN, RIGHT, INITIAL ENCOUNTER: ICD-10-CM

## 2023-10-11 DIAGNOSIS — R52 PAIN: ICD-10-CM

## 2023-10-11 DIAGNOSIS — S82.61XA AVULSION FRACTURE OF LATERAL MALLEOLUS, RIGHT, CLOSED, INITIAL ENCOUNTER: Primary | ICD-10-CM

## 2023-10-11 NOTE — ED PROVIDER NOTES
Encounter Date: 10/10/2023       History     Chief Complaint   Patient presents with    Ankle Pain     Right Ankle pain after fall.  Swelling and limited ROM.     Patient is a 68-year-old female with a past medical history of depression diabetes hypertension hepatitis who was sleeping in her chair.  She woke up stood up and twisted her ankle.  She has pain and swelling to the right lateral ankle.  She has limping gait.  No lacerations abrasions proximal leg pain knee pain thigh pain or foot pain.      Review of patient's allergies indicates:   Allergen Reactions    Indocin [indomethacin sodium] Itching    Statins-hmg-coa reductase inhibitors     Dextromethorphan Anxiety     Past Medical History:   Diagnosis Date    Cardiac arrhythmia     Chronic pain     Depression     Diabetes     Diabetes     GERD (gastroesophageal reflux disease)     Hepatitis     Hepatitis C    Hypertension     Hypertension     Insomnia      Past Surgical History:   Procedure Laterality Date    ANGIOGRAM, CORONARY, WITH LEFT HEART CATHETERIZATION  10/26/2021    Procedure: Angiogram, Coronary, with Left Heart Cath;  Surgeon: Tony Masters MD;  Location: [facility] CATH/EP LAB;  Service: Cardiology;;    ARTHROSCOPY OF BOTH KNEES      COLONOSCOPY N/A 5/10/2018    Procedure: COLONOSCOPY;  Surgeon: Skinny Fisher MD;  Location: [facility] ENDO;  Service: Endoscopy;  Laterality: N/A;    CORONARY ANGIOGRAPHY N/A 9/21/2021    Procedure: ANGIOGRAM, CORONARY ARTERY;  Surgeon: Tony Masters MD;  Location: [facility] CATH/EP LAB;  Service: Cardiology;  Laterality: N/A;    HYSTERECTOMY      LEFT HEART CATHETERIZATION Left 9/21/2021    Procedure: CATHETERIZATION, HEART, LEFT;  Surgeon: Toyn Masters MD;  Location: [facility] CATH/EP LAB;  Service: Cardiology;  Laterality: Left;    ROBOT-ASSISTED REPAIR OF UMBILICAL HERNIA USING DA AUBRIE XI N/A 5/1/2019    Procedure: XI ROBOTIC REPAIR, HERNIA, UMBILICAL;  Surgeon: Sp Craft MD;  Location: [facility] OR;  Service: General;   Laterality: N/A;    shoulder repair      SHOULDER SURGERY  1981    THYROIDECTOMY      THYROIDECTOMY, PARTIAL  1978    TUBAL LIGATION      TUBAL LIGATION  1981     Family History   Problem Relation Age of Onset    Heart disease Mother     Diabetes Mother     Crohn's disease Father     Rectal cancer Father     Breast cancer Daughter     Breast cancer Maternal Aunt      Social History     Tobacco Use    Smoking status: Former     Current packs/day: 0.00     Types: Cigarettes     Quit date: 4/26/2008     Years since quitting: 15.4    Smokeless tobacco: Never    Tobacco comments:     Quit 10 yrs ago   Substance Use Topics    Alcohol use: No    Drug use: No     Review of Systems   Constitutional:  Negative for fever.   Eyes:  Negative for pain and visual disturbance.   Respiratory:  Negative for cough and shortness of breath.    Cardiovascular:  Negative for chest pain.   Gastrointestinal:  Negative for abdominal pain, diarrhea, nausea and vomiting.   Genitourinary:  Negative for difficulty urinating, dysuria and flank pain.   Musculoskeletal:  Positive for arthralgias, gait problem and joint swelling.   Skin:  Negative for rash and wound.   Neurological:  Negative for weakness, numbness and headaches.   All other systems reviewed and are negative.      Physical Exam     Initial Vitals [10/10/23 2259]   BP Pulse Resp Temp SpO2   (!) 147/63 71 18 98.1 °F (36.7 °C) 99 %      MAP       --         Physical Exam    Nursing note and vitals reviewed.  Constitutional: She appears well-developed and well-nourished.  Non-toxic appearance. No distress.   HENT:   Head: Normocephalic and atraumatic.   Mouth/Throat: Oropharynx is clear and moist.   Eyes: Conjunctivae and EOM are normal. Pupils are equal, round, and reactive to light. No scleral icterus.   Neck: Neck supple.   Normal range of motion.  Cardiovascular:  Normal rate, regular rhythm, normal heart sounds and intact distal pulses.     Exam reveals no gallop and no friction  rub.       No murmur heard.  Pulmonary/Chest: Breath sounds normal. No respiratory distress. She has no decreased breath sounds. She has no wheezes. She has no rhonchi. She has no rales.   Abdominal: Abdomen is soft. Bowel sounds are normal. She exhibits no distension. There is no abdominal tenderness.   Musculoskeletal:         General: Tenderness (mild tenderness over the right lateral ankle) present. Normal range of motion.      Cervical back: Normal range of motion and neck supple.      Comments: No significant tenderness over the mid or forefoot     Neurological: She is alert and oriented to person, place, and time. She has normal strength. No cranial nerve deficit or sensory deficit.   Skin: Skin is warm and dry.   Psychiatric: She has a normal mood and affect.         ED Course   Procedures  Labs Reviewed - No data to display       Imaging Results              X-Ray Ankle Complete Right (Final result)  Result time 10/10/23 23:55:31      Final result by Huber Blackwell MD (10/10/23 23:55:31)                   Impression:      Negative right ankle.      Electronically signed by: Huber Blackwell  Date:    10/10/2023  Time:    23:55               Narrative:    EXAMINATION:  XR ANKLE COMPLETE 3 VIEW RIGHT    CLINICAL HISTORY:  Pain, unspecified    TECHNIQUE:  AP, lateral, and oblique images of the right ankle were performed.    COMPARISON:  None    FINDINGS:  Bones, joint spaces and soft tissues appear intact.  No foreign body or fracture.                                       Medications - No data to display  Medical Decision Making  Patient is a 68-year-old female who has an ankle sprain.  X-ray was but is negative however there might be a small avulsion fracture at the tip of the malleolus.  I will place the patient in a walking boot crutches and she needs to follow up with Podiatry.  I will place referral.  She appears to be stable for discharge at this time.    Amount and/or Complexity of Data  Reviewed  Radiology: ordered. Decision-making details documented in ED Course.                               Clinical Impression:   Final diagnoses:  [R52] Pain  [S82.61XA] Avulsion fracture of lateral malleolus, right, closed, initial encounter (Primary)  [S93.401A] Moderate ankle sprain, right, initial encounter        ED Disposition Condition    Discharge Stable          ED Prescriptions    None       Follow-up Information       Follow up With Specialties Details Why Contact Info    Corbin Rhoades DPM Podiatry, Surgery, Wound Care   [address]  [address] [place] [address]  [phone]               Julian Lovett MD  10/11/23 0105

## 2023-10-11 NOTE — DISCHARGE INSTRUCTIONS
The x-ray was read as negative by the radiologist however there might be a small crack in the bone over the distal fibula in the ankle.  This should not require surgery however you do need to see a podiatrist for follow-up to make sure you are getting better.  You can take Tylenol and ibuprofen for pain.  Use the walking boot and crutches until instructed otherwise by the podiatrist

## 2023-10-27 ENCOUNTER — OFFICE VISIT (OUTPATIENT)
Dept: PODIATRY | Facility: CLINIC | Age: 68
End: 2023-10-27
Payer: MEDICARE

## 2023-10-27 VITALS — HEIGHT: 64 IN | WEIGHT: 220 LBS | BODY MASS INDEX: 37.56 KG/M2

## 2023-10-27 DIAGNOSIS — M25.571 ACUTE RIGHT ANKLE PAIN: ICD-10-CM

## 2023-10-27 DIAGNOSIS — M76.71 PERONEAL TENDINITIS OF RIGHT LOWER EXTREMITY: Primary | ICD-10-CM

## 2023-10-27 DIAGNOSIS — S93.401A MODERATE ANKLE SPRAIN, RIGHT, INITIAL ENCOUNTER: ICD-10-CM

## 2023-10-27 PROCEDURE — 99203 OFFICE O/P NEW LOW 30 MIN: CPT | Mod: S$PBB,,, | Performed by: PODIATRIST

## 2023-10-27 PROCEDURE — 99215 OFFICE O/P EST HI 40 MIN: CPT | Mod: PBBFAC,PN | Performed by: PODIATRIST

## 2023-10-27 PROCEDURE — 99999 PR PBB SHADOW E&M-EST. PATIENT-LVL V: CPT | Mod: PBBFAC,,, | Performed by: PODIATRIST

## 2023-10-27 PROCEDURE — 99203 PR OFFICE/OUTPT VISIT, NEW, LEVL III, 30-44 MIN: ICD-10-PCS | Mod: S$PBB,,, | Performed by: PODIATRIST

## 2023-10-27 PROCEDURE — 99999 PR PBB SHADOW E&M-EST. PATIENT-LVL V: ICD-10-PCS | Mod: PBBFAC,,, | Performed by: PODIATRIST

## 2023-10-27 NOTE — PROGRESS NOTES
"  [address] [facility] [address] [place] [address]  Phone: [phone]   [phone]    Patient's PCP:Michelle, Primary Doctor  Referring Provider: Emergency Department    Subjective:      Chief Complaint:: Ankle Pain and Ankle Injury (Injury to the right ankle)    Ankle Pain   Pertinent negatives include no numbness.   Ankle Injury   Pertinent negatives include no numbness.     Lela Mckinney is a 68 y.o. female who presents today with a complaint of Injury to the right ankle. The current episode started 10/10/23.  The symptoms include aching and throbbing, minimal bruising . Probable cause of complaint fall from getting up and foot being numb.  The symptoms are aggravated by pressure. The problem has improved. Treatment to date have included wearing boot and elevation which provided some relief. Pt presents in boot today.    Systemic Doctor: Yoel Barclay MD  Date Last Seen: 7/27/23  Blood Sugar: not taken  Hemoglobin A1c: 5.9    Vitals:    10/27/23 0953   Weight: 99.8 kg (220 lb)   Height: 5' 4" (1.626 m)   PainSc:   2      Shoe Size: 7.5-8    Past Surgical History:   Procedure Laterality Date    ANGIOGRAM, CORONARY, WITH LEFT HEART CATHETERIZATION  10/26/2021    Procedure: Angiogram, Coronary, with Left Heart Cath;  Surgeon: Tony Masters MD;  Location: [facility] CATH/EP LAB;  Service: Cardiology;;    ARTHROSCOPY OF BOTH KNEES      COLONOSCOPY N/A 5/10/2018    Procedure: COLONOSCOPY;  Surgeon: Skinny Fisher MD;  Location: [facility];  Service: Endoscopy;  Laterality: N/A;    CORONARY ANGIOGRAPHY N/A 9/21/2021    Procedure: ANGIOGRAM, CORONARY ARTERY;  Surgeon: Tony Masters MD;  Location: [facility] CATH/EP LAB;  Service: Cardiology;  Laterality: N/A;    HYSTERECTOMY      LEFT HEART CATHETERIZATION Left 9/21/2021    Procedure: CATHETERIZATION, HEART, LEFT;  Surgeon: Tony Masters MD;  Location: [facility] CATH/EP LAB;  Service: Cardiology;  Laterality: Left;    ROBOT-ASSISTED REPAIR OF UMBILICAL HERNIA USING DA AUBRIE " XI N/A 5/1/2019    Procedure: XI ROBOTIC REPAIR, HERNIA, UMBILICAL;  Surgeon: Sp Craft MD;  Location: [facility] OR;  Service: General;  Laterality: N/A;    shoulder repair      SHOULDER SURGERY  1981    THYROIDECTOMY      THYROIDECTOMY, PARTIAL  1978    TUBAL LIGATION      TUBAL LIGATION  1981     Past Medical History:   Diagnosis Date    Cardiac arrhythmia     Chronic pain     Depression     Diabetes     Diabetes     GERD (gastroesophageal reflux disease)     Hepatitis     Hepatitis C    Hypertension     Hypertension     Insomnia      Family History   Problem Relation Age of Onset    Heart disease Mother     Diabetes Mother     Crohn's disease Father     Rectal cancer Father     Breast cancer Daughter     Breast cancer Maternal Aunt         Social History:   Marital Status:   Alcohol History:  reports no history of alcohol use.  Tobacco History:  reports that she quit smoking about 15 years ago. Her smoking use included cigarettes. She has never used smokeless tobacco.  Drug History:  reports no history of drug use.    Review of patient's allergies indicates:   Allergen Reactions    Indocin [indomethacin sodium] Itching    Statins-hmg-coa reductase inhibitors     Dextromethorphan Anxiety       Current Outpatient Medications   Medication Sig Dispense Refill    albuterol (VENTOLIN HFA) 90 mcg/actuation inhaler Inhale 2 puffs into the lungs every 6 (six) hours as needed for Wheezing. Rescue 18 g 0    ALPRAZolam (XANAX) 0.25 MG tablet Take 1 tablet (0.25 mg total) by mouth 3 (three) times daily as needed for Insomnia or Anxiety. 90 tablet 2    amLODIPine (NORVASC) 10 MG tablet Take 5 mg by mouth once daily.      amLODIPine (NORVASC) 5 MG tablet Take 5 mg by mouth.      ascorbic acid, vitamin C, (VITAMIN C) 500 MG tablet Take 500 mg by mouth once daily.       ascorbic acid-multivit-min 1,000 mg PwEP Take by mouth.      aspirin (ECOTRIN) 81 MG EC tablet Take 1 tablet (81 mg total) by mouth once daily. 30  tablet 0    atorvastatin (LIPITOR) 80 MG tablet Take 80 mg by mouth.      azelastine (ASTELIN) 137 mcg (0.1 %) nasal spray 1 spray (137 mcg total) by Nasal route 2 (two) times daily. 30 mL 0    b complex vitamins capsule Take 1 capsule by mouth once daily.      blood sugar diagnostic (TRUE METRIX GLUCOSE TEST STRIP MISC) True Metrix Glucose Test Strip      citalopram (CELEXA) 40 MG tablet citalopram 40 mg tablet      clobetasoL (TEMOVATE) 0.05 % external solution SMARTSIG:Sparingly Topical Twice Daily PRN      clopidogreL (PLAVIX) 75 mg tablet Take 1 tablet (75 mg total) by mouth once daily. 30 tablet 11    coenzyme Q10 100 mg capsule Take 200 mg by mouth once daily.      diclofenac (VOLTAREN) 50 MG EC tablet Take 1 tablet (50 mg total) by mouth 2 (two) times daily as needed (pain). 20 tablet 0    ezetimibe (ZETIA) 10 mg tablet Take 10 mg by mouth once daily.      fexofenadine (ALLEGRA) 180 MG tablet Take 180 mg by mouth 2 (two) times daily.       fluticasone propionate (FLONASE) 50 mcg/actuation nasal spray 1 spray (50 mcg total) by Each Nostril route once daily. 16 g 0    gabapentin (NEURONTIN) 100 MG capsule Take 300 mg by mouth 2 (two) times daily. Patient takes this for foot pain- neuropathy , prescribed by Dr. Deedee Rosas (foot doctor)      hydrocortisone 2.5 % cream APPLY A SMALL AMOUNT TO AFFECTED AREA TWICE A DAY AS NEEDED      isosorbide mononitrate (IMDUR) 60 MG 24 hr tablet Take 2 tablets (120 mg total) by mouth once daily. (Patient taking differently: Take 60 mg by mouth once daily.) 60 tablet 11    Lactobacillus rhamnosus GG (CULTURELLE) 10 billion cell capsule Take 1 capsule by mouth every evening.       linaGLIPtin (TRADJENTA) 5 mg Tab tablet Take 5 mg by mouth once daily.       lisinopril-hydrochlorothiazide (PRINZIDE,ZESTORETIC) 10-12.5 mg per tablet TAKE 1 TABLET BY MOUTH ONCE DAILY FOR  90  DAYS (Patient taking differently: Take 1 tablet by mouth once daily.) 90 tablet 1    metFORMIN  (GLUCOPHAGE) 500 MG tablet Take 1 tablet (500 mg total) by mouth 2 (two) times daily with meals.      metoprolol succinate (TOPROL-XL) 25 MG 24 hr tablet Take 1 tablet (25 mg total) by mouth once daily. 30 tablet 11    nitroGLYCERIN (NITROSTAT) 0.4 MG SL tablet Place 0.4 mg under the tongue every 5 (five) minutes as needed for Chest pain.      pantoprazole (PROTONIX) 40 MG tablet Take 40 mg by mouth once daily.      psyllium (METAMUCIL) packet Take 1 packet by mouth once daily.      pulse oximeter (PULSE OXIMETER) device Use twice daily at 8 AM and 3 PM and record the value in [employer] as directed. 1 each 0    ranolazine (RANEXA) 500 MG Tb12 Take 500 mg by mouth 2 (two) times daily.      traZODone (DESYREL) 150 MG tablet Take 150 mg by mouth every evening.      triamcinolone acetonide 0.1% (KENALOG) 0.1 % cream Apply 1 g topically 2 (two) times daily.      vitamin D (VITAMIN D3) 1000 units Tab Take 5,000 Units by mouth once daily.        No current facility-administered medications for this visit.       Review of Systems   Constitutional:  Negative for chills, fatigue, fever and unexpected weight change.   HENT:  Negative for hearing loss and trouble swallowing.    Eyes:  Negative for photophobia and visual disturbance.   Respiratory:  Negative for cough, shortness of breath and wheezing.    Cardiovascular:  Negative for chest pain, palpitations and leg swelling.   Gastrointestinal:  Negative for abdominal pain and nausea.   Genitourinary:  Negative for dysuria and frequency.   Musculoskeletal:  Positive for arthralgias and gait problem. Negative for back pain, joint swelling, myalgias and neck pain.   Skin:  Negative for rash and wound.   Neurological:  Negative for tremors, seizures, weakness, numbness and headaches.   Hematological:  Does not bruise/bleed easily.         Objective:        Physical Exam:   Foot Exam    General  General Appearance: appears stated age and healthy   Orientation: alert and oriented to  person, place, and time   Affect: appropriate   Gait: antalgic       Right Foot/Ankle     Inspection and Palpation  Ecchymosis: none  Tenderness: ankle (Peroneal tendons)  Swelling: ankle (Mild)  Arch: normal  Skin Exam: skin intact; no drainage, no ulcer and no erythema   Neurovascular  Dorsalis pedis: 2+  Posterior tibial: 2+  Capillary Refill: 2+  Varicose veins: not present  Saphenous nerve sensation: normal  Tibial nerve sensation: normal  Superficial peroneal nerve sensation: normal  Deep peroneal nerve sensation: normal  Sural nerve sensation: normal    Edema  Type of edema: non-pitting    Muscle Strength  Ankle dorsiflexion: 5  Ankle plantar flexion: 5  Ankle inversion: 5  Ankle eversion: 5  Great toe extension: 5  Great toe flexion: 5    Range of Motion    Normal right ankle ROM  Passive  Ankle eversion: pain  Ankle inversion: pain    Active  Ankle eversion: pain  Ankle inversion: pain    Tests  Anterior drawer: negative   Talar tilt: negative   PT Tinel's sign: negative    Paresthesia: negative        Physical Exam  Cardiovascular:      Pulses:           Dorsalis pedis pulses are 2+ on the right side.        Posterior tibial pulses are 2+ on the right side.   Musculoskeletal:      Right ankle: Swelling present. Tenderness present.   Feet:      Right foot:      Skin integrity: No ulcer or erythema.               Right Ankle/Foot Exam     Range of Motion   The patient has normal right ankle ROM.        Muscle Strength   Right Lower Extremity   Ankle Dorsiflexion:  5   Plantar flexion:  5/5    Vascular Exam     Right Pulses  Dorsalis Pedis:      2+  Posterior Tibial:      2+           Imaging: X-Ray Ankle Complete Right  Narrative: EXAMINATION:  XR ANKLE COMPLETE 3 VIEW RIGHT    CLINICAL HISTORY:  Pain, unspecified    TECHNIQUE:  AP, lateral, and oblique images of the right ankle were performed.    COMPARISON:  None    FINDINGS:  Bones, joint spaces and soft tissues appear intact.  No foreign body or  fracture.  Impression: Negative right ankle.    Electronically signed by: Huber Blackwell  Date:    10/10/2023  Time:    23:55               Assessment:       1. Peroneal tendinitis of right lower extremity    2. Moderate ankle sprain, right, initial encounter    3. Acute right ankle pain      Plan:   Peroneal tendinitis of right lower extremity    Moderate ankle sprain, right, initial encounter  -     Ambulatory referral/consult to Podiatry    Acute right ankle pain      Follow up if symptoms worsen or fail to improve.    Procedures        I discussed ankle sprain with pt and the different grades/severity of sprain and different ligaments that can be torn.  I also discussed that most ankle sprains are treated conservatively and the pt does well.  Occasionally some sprains do not heal normally and there is insatbility of the joint leading to pain and possible arthritis.  these are usually evaluated by MRI, stress test.    CAM walker boot with weight bearing  Ice to painful area, 15 minutes at a time  Ace-wrap to help with swelling  No barefoot   Keep affected extremity elevated while seated      Counseling:     I provided patient education verbally regarding:   Patient diagnosis, treatment options, as well as alternatives, risks, and benefits.     This note was created using Dragon voice recognition software that occasionally misinterpreted phrases or words.

## 2023-10-27 NOTE — PATIENT INSTRUCTIONS
Ankle Sprain (Adult)  An ankle sprain is a stretching or tearing of the ligaments that hold the ankle joint together. There are no broken bones.  An ankle sprain is a common injury for both children and adults. It happens when the ankle turns, twists, or rolls in an awkward way. This can be caused by a sports injury. Or it can happen from doing something as simple as stepping on an uneven surface.  Ligaments are made of tough connective tissue. Normally, ligaments stretch a certain amount and then go back to their normal place. A sprain happens when a ligament is forced to stretch more than the normal amount. A severe sprain can actually tear the ligaments. If you have a severe sprain, you may have felt or heard something like a pop when you were injured.  Ankle sprains are given a grade depending on whether they are mild, moderate, or severe:  Grade 1 sprain. A mild sprain with minor stretching and damage to the ligament.  Grade 2 sprain. A moderate sprain where the ligament is partly torn.  Grade 3 sprain. The most severe kind of sprain. The ligament is completely torn.  Most sprains take about 4 to 6 weeks to heal. A severe sprain can take several months to recover.  Your healthcare provider may order X-rays to be sure you dont have a fracture, or broken bone.  The injured area will feel sore.  Swelling and pain may make it hard to walk. You may need crutches if walking is painful. Or your provider may have you use a cast boot or air splint. This will depend on the grade of ankle sprain that you have.    Home care  For a Grade 1 sprain, use RICE (rest, ice, compression, and elevation):  Rest your ankle. Dont walk on it.  Ice should be used right away to help control swelling. Place an ice pack over the injured area for 20 minutes. Do this every 3 to 6 hours for the first 24 to 48 hours. Keep using ice packs to ease pain and swelling as needed. To make an ice pack, put ice cubes in a plastic bag that seals at  the top. Wrap the bag in a clean, thin towel or cloth. Never put ice or an ice pack directly on the skin. The ice pack can be put right on the cast, bandage, or splint. As the ice melts, be careful that the cast, bandage, or splint doesnt get wet. If you have a boot, open it to apply an ice pack, unless told otherwise by your provider.  Compression devices help to control swelling. They also keep the ankle from moving and support your injured ankle. These devices include dressings, bandages, and wraps.  Elevate or raise your ankle above the level of your heart when sitting or lying down. This is very important for the first 48 hours.  Follow the RICE guidelines for a Grade 2 sprain. This type of sprain will take longer to heal. Your provider may have you wear a splint, cast, or brace to keep your ankle from moving.   If you have a Grade 3 sprain, you are at risk for long-term ankle instability. In rare cases, surgery may be needed. Your provider may have you wear a short leg cast or a walking boot for 2 to 3 weeks.  After 48 hours, it may be helpful to apply heat for 20 minutes several times a day. You can do this with a heating pad or warm compress. Or you may want to go back and forth between using ice and heat. Never apply heat directly to the skin. Always wrap the heating pad or warm compress in a clean, thin towel or cloth.  You may use over-the-counter pain medicine (NSAIDS or nonsteroidal anti-inflammatory drugs) to control pain, unless another pain medicine was prescribed. Talk with your provider before using these medicines if you have chronic liver or kidney disease, or have ever had a stomach ulcer or GI (gastrointestinal) bleeding.  Follow any rehabilitation exercises your provider gives you. These can help you be more flexible and improve your balance and coordination. This is helpful in preventing long-term ankle problems.  Prevention  To help prevent ankle sprains, its important to have good  strength, balance, and flexibility. Be sure to:  Always warm up before you exercise or do something very active  Be careful when walking or running on uneven or cracked surfaces  Wear shoes that are in good condition and fit well  Listen to your bodys signals to slow down when you are in pain or tired  Follow-up care  Any X-rays you had today dont show any broken bones, breaks, or fractures. Sometimes fractures dont show up on the first X-ray. Bruises and sprains can sometimes hurt as much as a fracture. These injuries can take time to heal completely. If your symptoms dont get better or they get worse, talk with your healthcare provider. You may need a repeat X-ray.  Follow up with your healthcare provider, or as advised. Check for any warning signs listed below.  When to seek medical advice  Call your healthcare provider right away if any of these occur:  Fever of 100.4 F (38 C) or higher, or as directed by your healthcare provider  The injury doesnt seem to be healing  The swelling comes back  The cast has a bad smell  The plaster cast or splint gets wet or soft  The fiberglass cast or splint gets wet and does not dry for 24 hours  The pain or swelling increases, or redness appears  Your toes become cold, blue, numb, or tingly  The skin is discolored (looks blue, purple, or gray), has blisters, or is irritated  You re-injure your ankle  Date Last Reviewed: 11/20/2015  © 8500-8915 [employer]. [address] [place] [address], [place] [address]. All rights reserved. This information is not intended as a substitute for professional medical care. Always follow your healthcare professional's instructions.

## 2023-11-08 ENCOUNTER — HOSPITAL ENCOUNTER (OUTPATIENT)
Dept: RADIOLOGY | Facility: HOSPITAL | Age: 68
Discharge: HOME | End: 2023-11-08
Attending: STUDENT IN AN ORGANIZED HEALTH CARE EDUCATION/TRAINING PROGRAM
Payer: MEDICAID

## 2023-11-08 DIAGNOSIS — R92.8 ABNORMAL MAMMOGRAM: ICD-10-CM

## 2023-11-09 ENCOUNTER — HOSPITAL ENCOUNTER (OUTPATIENT)
Dept: RADIOLOGY | Facility: HOSPITAL | Age: 68
Discharge: HOME | End: 2023-11-09
Attending: STUDENT IN AN ORGANIZED HEALTH CARE EDUCATION/TRAINING PROGRAM
Payer: MEDICAID

## 2023-11-09 PROCEDURE — 77065 MAMMO DIGITAL DIAGNOSTIC LEFT WITH TOMO: ICD-10-PCS | Mod: 26,LT,, | Performed by: RADIOLOGY

## 2023-11-09 PROCEDURE — 77065 DX MAMMO INCL CAD UNI: CPT | Mod: TC,LT

## 2023-11-09 PROCEDURE — 77061 BREAST TOMOSYNTHESIS UNI: CPT | Mod: 26,LT,, | Performed by: RADIOLOGY

## 2023-11-09 PROCEDURE — 76642 US BREAST LEFT LIMITED: ICD-10-PCS | Mod: 26,LT,, | Performed by: RADIOLOGY

## 2023-11-09 PROCEDURE — 76642 ULTRASOUND BREAST LIMITED: CPT | Mod: TC,LT

## 2023-11-09 PROCEDURE — 77065 DX MAMMO INCL CAD UNI: CPT | Mod: 26,LT,, | Performed by: RADIOLOGY

## 2023-11-09 PROCEDURE — 76642 ULTRASOUND BREAST LIMITED: CPT | Mod: 26,LT,, | Performed by: RADIOLOGY

## 2023-11-09 PROCEDURE — 77061 MAMMO DIGITAL DIAGNOSTIC LEFT WITH TOMO: ICD-10-PCS | Mod: 26,LT,, | Performed by: RADIOLOGY

## 2023-11-10 DIAGNOSIS — R92.8 FOLLOW-UP EXAMINATION OF ABNORMAL MAMMOGRAM: Primary | ICD-10-CM

## 2024-05-10 ENCOUNTER — HOSPITAL ENCOUNTER (OUTPATIENT)
Dept: RADIOLOGY | Facility: HOSPITAL | Age: 69
Discharge: HOME | End: 2024-05-10
Attending: STUDENT IN AN ORGANIZED HEALTH CARE EDUCATION/TRAINING PROGRAM
Payer: MEDICAID

## 2024-05-10 DIAGNOSIS — R92.8 FOLLOW-UP EXAMINATION OF ABNORMAL MAMMOGRAM: ICD-10-CM

## 2024-05-10 PROCEDURE — 77061 BREAST TOMOSYNTHESIS UNI: CPT | Mod: TC,LT

## 2024-05-10 PROCEDURE — 77065 DX MAMMO INCL CAD UNI: CPT | Mod: TC,LT

## 2024-05-10 PROCEDURE — 76642 ULTRASOUND BREAST LIMITED: CPT | Mod: TC,LT

## 2024-05-10 PROCEDURE — 77061 BREAST TOMOSYNTHESIS UNI: CPT | Mod: 26,LT,, | Performed by: RADIOLOGY

## 2024-05-10 PROCEDURE — 77065 DX MAMMO INCL CAD UNI: CPT | Mod: 26,LT,, | Performed by: RADIOLOGY

## 2024-05-10 PROCEDURE — 76642 ULTRASOUND BREAST LIMITED: CPT | Mod: 26,LT,, | Performed by: RADIOLOGY

## 2024-05-13 DIAGNOSIS — R92.8 ABNORMAL MAMMOGRAM: Primary | ICD-10-CM

## 2024-07-02 ENCOUNTER — OFFICE VISIT (OUTPATIENT)
Dept: URGENT CARE | Facility: CLINIC | Age: 69
End: 2024-07-02
Payer: MEDICAID

## 2024-07-02 VITALS
RESPIRATION RATE: 18 BRPM | SYSTOLIC BLOOD PRESSURE: 138 MMHG | BODY MASS INDEX: 37.56 KG/M2 | DIASTOLIC BLOOD PRESSURE: 70 MMHG | HEIGHT: 64 IN | WEIGHT: 220 LBS | HEART RATE: 73 BPM | OXYGEN SATURATION: 98 % | TEMPERATURE: 98 F

## 2024-07-02 DIAGNOSIS — H11.421 CHEMOSIS OF RIGHT CONJUNCTIVA: Primary | ICD-10-CM

## 2024-07-02 DIAGNOSIS — L03.213 PRESEPTAL CELLULITIS OF RIGHT EYE: ICD-10-CM

## 2024-07-02 PROCEDURE — 99204 OFFICE O/P NEW MOD 45 MIN: CPT | Mod: S$GLB,,, | Performed by: NURSE PRACTITIONER

## 2024-07-02 RX ORDER — POLYMYXIN B SULFATE AND TRIMETHOPRIM 1; 10000 MG/ML; [USP'U]/ML
1 SOLUTION OPHTHALMIC EVERY 4 HOURS
Qty: 10 ML | Refills: 0 | Status: SHIPPED | OUTPATIENT
Start: 2024-07-02 | End: 2024-07-09

## 2024-07-02 RX ORDER — AMOXICILLIN AND CLAVULANATE POTASSIUM 875; 125 MG/1; MG/1
1 TABLET, FILM COATED ORAL 2 TIMES DAILY
Qty: 14 TABLET | Refills: 0 | Status: SHIPPED | OUTPATIENT
Start: 2024-07-02 | End: 2024-07-09

## 2024-07-02 NOTE — PROGRESS NOTES
"Subjective:      Patient ID: Lela Mckinney is a 68 y.o. female.    Vitals:  height is 5' 4" (1.626 m) and weight is 99.8 kg (220 lb). Her oral temperature is 97.7 °F (36.5 °C). Her blood pressure is 138/70 and her pulse is 73. Her respiration is 18 and oxygen saturation is 98%.     Chief Complaint: Facial Swelling    Lela Mckinney is a 68 year old female presenting to the clinic with c/o right eye redness and swelling. Symptoms have been present for five days with itchy and clear, watery discharge as well. She reports some crusting in the morning upon waking. Mild blurry vision with no pain with eye movement or restriction of EOM. Denies a foreign body sensation.         Constitution: Negative.   HENT: Negative.     Neck: neck negative.   Cardiovascular: Negative.    Eyes:  Positive for eye discharge, eye itching, eye redness and blurred vision.   Respiratory: Negative.     Gastrointestinal: Negative.    Genitourinary: Negative.    Musculoskeletal: Negative.    Skin: Negative.    Neurological: Negative.       Objective:     Physical Exam   Constitutional: She is oriented to person, place, and time. She appears well-developed. She is cooperative.  Non-toxic appearance. She does not appear ill. No distress.   HENT:   Head: Normocephalic and atraumatic.   Ears:   Right Ear: Hearing and external ear normal.   Left Ear: Hearing and external ear normal.   Nose: Nose normal. No mucosal edema, rhinorrhea or nasal deformity. No epistaxis. Right sinus exhibits no maxillary sinus tenderness and no frontal sinus tenderness. Left sinus exhibits no maxillary sinus tenderness and no frontal sinus tenderness.   Mouth/Throat: Uvula is midline, oropharynx is clear and moist and mucous membranes are normal. No trismus in the jaw. Normal dentition. No uvula swelling. No oropharyngeal exudate, posterior oropharyngeal edema or posterior oropharyngeal erythema.   Eyes: Lids are normal. Pupils are equal, round, and reactive to " light. Right eye exhibits chemosis and discharge (clear). Right eye exhibits no exudate. No foreign body present in the right eye. Right conjunctiva is injected. Right conjunctiva has no hemorrhage. No scleral icterus. Right eye exhibits normal extraocular motion. Right pupil is round and reactive.   Slit lamp exam:       The right eye shows no fluorescein uptake. Extraocular movement intact      Comments: Inferior orbital erythema and edema  right eye Stella exam negative    Neck: Trachea normal and phonation normal. Neck supple. No edema present. No erythema present. No neck rigidity present.   Cardiovascular: Normal rate, regular rhythm, normal heart sounds and normal pulses.   Pulmonary/Chest: Effort normal and breath sounds normal. No respiratory distress. She has no decreased breath sounds. She has no rhonchi.   Abdominal: Normal appearance.   Musculoskeletal: Normal range of motion.         General: No deformity. Normal range of motion.   Neurological: She is alert and oriented to person, place, and time. She exhibits normal muscle tone. Coordination normal.   Skin: Skin is warm, dry, intact, not diaphoretic and not pale.   Psychiatric: Her speech is normal and behavior is normal. Judgment and thought content normal.   Nursing note and vitals reviewed.      Assessment:     1. Chemosis of right conjunctiva    2. Preseptal cellulitis of right eye        Plan:     The patient has chemosis of the right conjunctiva with no fluorescein uptake or foreign body identified. Negative Stella sign. No hyphema or evidence of iritis. She also has inferior orbital erythema and edema concerning for periorbital cellulitis. No restriction or pain on EOM. Vision grossly intact. SHe does not wear contacts. Will start Augmentin and polytrim and have her follow up with opthalmology. Strict ED precautions discussed and she verbalized understanding.     Chemosis of right conjunctiva    Preseptal cellulitis of right eye    Other  orders  -     amoxicillin-clavulanate 875-125mg (AUGMENTIN) 875-125 mg per tablet; Take 1 tablet by mouth 2 (two) times daily. for 7 days  Dispense: 14 tablet; Refill: 0  -     polymyxin B sulf-trimethoprim (POLYTRIM) 10,000 unit- 1 mg/mL Drop; Place 1 drop into the right eye every 4 (four) hours. for 7 days  Dispense: 10 mL; Refill: 0

## 2024-08-23 ENCOUNTER — OFFICE VISIT (OUTPATIENT)
Dept: URGENT CARE | Facility: CLINIC | Age: 69
End: 2024-08-23
Payer: MEDICAID

## 2024-08-23 VITALS
RESPIRATION RATE: 16 BRPM | TEMPERATURE: 99 F | SYSTOLIC BLOOD PRESSURE: 106 MMHG | DIASTOLIC BLOOD PRESSURE: 80 MMHG | BODY MASS INDEX: 37.76 KG/M2 | WEIGHT: 220 LBS | OXYGEN SATURATION: 93 % | HEART RATE: 71 BPM

## 2024-08-23 DIAGNOSIS — M54.50 ACUTE LEFT-SIDED LOW BACK PAIN WITHOUT SCIATICA: ICD-10-CM

## 2024-08-23 DIAGNOSIS — M25.562 ACUTE PAIN OF LEFT KNEE: ICD-10-CM

## 2024-08-23 DIAGNOSIS — R07.81 RIB PAIN ON LEFT SIDE: ICD-10-CM

## 2024-08-23 DIAGNOSIS — M25.572 ACUTE LEFT ANKLE PAIN: ICD-10-CM
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
## 2024-08-23 DIAGNOSIS — M54.2 NECK PAIN: ICD-10-CM

## 2024-08-23 DIAGNOSIS — S80.211A ABRASION, RIGHT KNEE, INITIAL ENCOUNTER: ICD-10-CM

## 2024-08-23 PROCEDURE — 72040 X-RAY EXAM NECK SPINE 2-3 VW: CPT | Mod: S$GLB,,, | Performed by: RADIOLOGY

## 2024-08-23 PROCEDURE — 72100 X-RAY EXAM L-S SPINE 2/3 VWS: CPT | Mod: S$GLB,,, | Performed by: RADIOLOGY

## 2024-08-23 PROCEDURE — 73562 X-RAY EXAM OF KNEE 3: CPT | Mod: LT,S$GLB,, | Performed by: RADIOLOGY

## 2024-08-23 PROCEDURE — 71046 X-RAY EXAM CHEST 2 VIEWS: CPT | Mod: S$GLB,,, | Performed by: RADIOLOGY

## 2024-08-23 PROCEDURE — 73610 X-RAY EXAM OF ANKLE: CPT | Mod: LT,S$GLB,, | Performed by: RADIOLOGY

## 2024-08-23 RX ORDER — NAPROXEN 500 MG/1
500 TABLET ORAL 2 TIMES DAILY PRN
Qty: 14 TABLET | Refills: 0 | Status: SHIPPED | OUTPATIENT
Start: 2024-08-23 | End: 2024-08-30

## 2024-08-23 RX ORDER — MUPIROCIN 20 MG/G
OINTMENT TOPICAL 3 TIMES DAILY
Qty: 22 G | Refills: 0 | Status: SHIPPED | OUTPATIENT
Start: 2024-08-23 | End: 2024-08-30

## 2024-08-23 NOTE — PROGRESS NOTES
Subjective:      Patient ID: Lela Mckinney is a 69 y.o. female.    Vitals:  weight is 99.8 kg (220 lb). Her oral temperature is 98.8 °F (37.1 °C). Her blood pressure is 106/80 and her pulse is 71. Her respiration is 16 and oxygen saturation is 93% (abnormal).     Chief Complaint: Fall    69-year-old female seen today with multiple complaints.  She states she was bending over putting a chain/leash on her dog's collar when she became a little lightheaded and fell.  She states she landed on outstretched hands and knees.  She denies any loss of consciousness or hitting her head.  She reports pain to left ribs, left knee, left ankle, neck and lower back.  She denies any chest pain or shortness for breath at any point in time.  She has full recollection of the events.  She has an abrasion to the right knee which she has cleaned and dressed prior to arrival    Fall  The accident occurred 12 to 24 hours ago. The fall occurred while standing (patient states she bent over to place dog on leash, got light headed and fell over.). The point of impact was the left foot, left knee, right knee and left hip. The pain is present in the right knee, left upper arm, left lower arm, left hip, neck, left foot and left knee. The pain is at a severity of 5/10. The pain is mild. The symptoms are aggravated by standing and use of injured limb. Pertinent negatives include no fever, headaches, nausea or vomiting. She has tried nothing for the symptoms. The treatment provided no relief.       Constitution: Negative for chills and fever.   Neck: Positive for neck pain.   Cardiovascular:  Negative for chest pain, palpitations and sob on exertion.   Respiratory:  Negative for cough and shortness of breath.    Gastrointestinal:  Negative for nausea and vomiting.   Musculoskeletal:  Positive for back pain and muscle ache.   Skin:  Positive for abrasion and erythema. Negative for rash and bruising.   Neurological:  Negative for dizziness,  light-headedness, passing out, loss of balance, headaches, disorientation and altered mental status.   Psychiatric/Behavioral:  Negative for altered mental status, disorientation and confusion.       Objective:     Physical Exam   Constitutional: She is oriented to person, place, and time. She appears well-developed. She is cooperative.  Non-toxic appearance. She does not appear ill. No distress.   HENT:   Head: Normocephalic and atraumatic.   Ears:   Right Ear: Hearing, tympanic membrane, external ear and ear canal normal. No hemotympanum.   Left Ear: Hearing, tympanic membrane, external ear and ear canal normal. No hemotympanum.   Nose: Nose normal. No mucosal edema, rhinorrhea, nasal deformity or congestion. No epistaxis. Right sinus exhibits no maxillary sinus tenderness and no frontal sinus tenderness. Left sinus exhibits no maxillary sinus tenderness and no frontal sinus tenderness.   Mouth/Throat: Uvula is midline, oropharynx is clear and moist and mucous membranes are normal. Mucous membranes are moist. No trismus in the jaw. Normal dentition. No uvula swelling. No oropharyngeal exudate, posterior oropharyngeal edema or posterior oropharyngeal erythema.   Eyes: Conjunctivae and lids are normal. No scleral icterus.   Neck: Trachea normal and phonation normal. Neck supple. No crepitus.     No edema present. No erythema present. No neck rigidity present. No decreased range of motion present. pain with movement present. No spinous process tenderness present. muscular tenderness present.   Cardiovascular: Normal rate, regular rhythm, normal heart sounds and normal pulses.   Pulses:       Radial pulses are 2+ on the right side and 2+ on the left side.        Dorsalis pedis pulses are 2+ on the left side.      Comments: Left foot pink warm and dry     Pulmonary/Chest: Effort normal and breath sounds normal. No accessory muscle usage or stridor. No respiratory distress. She has no decreased breath sounds. She has no  rhonchi.       Abdominal: Normal appearance.   Musculoskeletal: Normal range of motion.         General: No deformity. Normal range of motion.        Back:         Feet:    Lymphadenopathy:     She has no cervical adenopathy.   Neurological: no focal deficit. She is alert and oriented to person, place, and time. She exhibits normal muscle tone. Coordination normal.   Skin: Skin is warm, dry, intact, not diaphoretic and not pale. Capillary refill takes 2 to 3 seconds. erythema   Psychiatric: Her speech is normal and behavior is normal. Judgment and thought content normal.   Nursing note and vitals reviewed.      Assessment:     1. Fall, initial encounter    2. Acute pain of left knee    3. Acute left ankle pain    4. Neck pain    5. Acute left-sided low back pain without sciatica    6. Rib pain on left side    7. Abrasion, right knee, initial encounter        Plan:       Fall, initial encounter  -     XR ANKLE COMPLETE 3 VIEW LEFT; Future; Expected date: 08/23/2024  -     X-Ray Cervical Spine AP And Lateral; Future; Expected date: 08/23/2024  -     XR LUMBAR SPINE 2 OR 3 VIEWS; Future; Expected date: 08/23/2024  -     XR KNEE 3 VIEW LEFT; Future; Expected date: 08/23/2024  -     naproxen (NAPROSYN) 500 MG tablet; Take 1 tablet (500 mg total) by mouth 2 (two) times daily as needed (pain).  Dispense: 14 tablet; Refill: 0    Acute pain of left knee  -     XR KNEE 3 VIEW LEFT; Future; Expected date: 08/23/2024  -     naproxen (NAPROSYN) 500 MG tablet; Take 1 tablet (500 mg total) by mouth 2 (two) times daily as needed (pain).  Dispense: 14 tablet; Refill: 0    Acute left ankle pain  -     XR ANKLE COMPLETE 3 VIEW LEFT; Future; Expected date: 08/23/2024  -     naproxen (NAPROSYN) 500 MG tablet; Take 1 tablet (500 mg total) by mouth 2 (two) times daily as needed (pain).  Dispense: 14 tablet; Refill: 0    Neck pain  -     X-Ray Cervical Spine AP And Lateral; Future; Expected date: 08/23/2024  -     naproxen (NAPROSYN) 500  MG tablet; Take 1 tablet (500 mg total) by mouth 2 (two) times daily as needed (pain).  Dispense: 14 tablet; Refill: 0    Acute left-sided low back pain without sciatica  -     XR LUMBAR SPINE 2 OR 3 VIEWS; Future; Expected date: 08/23/2024  -     naproxen (NAPROSYN) 500 MG tablet; Take 1 tablet (500 mg total) by mouth 2 (two) times daily as needed (pain).  Dispense: 14 tablet; Refill: 0    Rib pain on left side  -     XR CHEST PA AND LATERAL; Future; Expected date: 08/23/2024  -     naproxen (NAPROSYN) 500 MG tablet; Take 1 tablet (500 mg total) by mouth 2 (two) times daily as needed (pain).  Dispense: 14 tablet; Refill: 0    Abrasion, right knee, initial encounter  -     naproxen (NAPROSYN) 500 MG tablet; Take 1 tablet (500 mg total) by mouth 2 (two) times daily as needed (pain).  Dispense: 14 tablet; Refill: 0  -     mupirocin (BACTROBAN) 2 % ointment; Apply topically 3 (three) times daily. for 7 days  Dispense: 22 g; Refill: 0      The x-ray results and physical exam findings were discussed with the patient and all questions answered. We discussed symptom monitoring, conservative care methods, medication use, and follow up orders. she verbalized understanding and agreement with the plan of care.

## 2024-08-23 NOTE — PATIENT INSTRUCTIONS
Increase clear fluid intake  May apply ice to affected area for 15 minutes every 2 hours.  After 48 hours may progress to moist heat or heating pad.  Take care not to fall sleep on heating pad as this may cause severe burns  Elevate area at rest  Take naproxen as directed.  Take each dose with a full meal.  Do not take any additional NSAIDs while taking naproxen.  You may take additional Tylenol as needed between doses    Wash right knee abrasion with warm soapy water daily, apply mupirocin ointment and adhesive bandage.  Change dressing daily  Follow-up with primary care provider   Return to clinic for new or worse symptoms

## 2024-09-03 ENCOUNTER — TELEPHONE (OUTPATIENT)
Dept: PODIATRY | Facility: CLINIC | Age: 69
End: 2024-09-03
Payer: MEDICARE

## (undated) DEVICE — GUIDEWIRE PT GRAPHIX 3CM J TIP .014X182

## (undated) DEVICE — HEMOSTAT VASC BAND REG 24CM

## (undated) DEVICE — CATHETER GUIDE LAUNCHER EBU3.25 6FX100CM

## (undated) DEVICE — KIT INFLATION MERIT (IN8152, HP9200E)

## (undated) DEVICE — VALVE BLEEDBACK CONTROL 1003330

## (undated) DEVICE — CATHETER GUIDE LAUNCHER EBU3.5 6X110

## (undated) DEVICE — GUIDEWIRE J TIP EXCHANGE FIXED CORE 260

## (undated) DEVICE — Device

## (undated) DEVICE — GUIDE RUNWAY 6FR CLS 3.5

## (undated) DEVICE — CATHETER GUIDE LAUNCHER AL 1 6X110

## (undated) DEVICE — CATHETER RADIAL TIG 4.0 OPTITORQUE 6X110

## (undated) DEVICE — CATHETER EAGLE EYE 5FR 85900P

## (undated) DEVICE — GUIDEWIRE REG. ANGLED .035X260 LAUREATE

## (undated) DEVICE — GUIDE LAUNCHER 6FR EBU 3.0

## (undated) DEVICE — CATHETER DIAGNOSTIC DXTERITY 6FR JR 4.0

## (undated) DEVICE — SHEATH INTRODUCER SLENDER 6FX10CM

## (undated) DEVICE — CATHETER BALLOON EUPHORA 2.00X10MM